# Patient Record
Sex: MALE | Race: WHITE | Employment: FULL TIME | URBAN - METROPOLITAN AREA
[De-identification: names, ages, dates, MRNs, and addresses within clinical notes are randomized per-mention and may not be internally consistent; named-entity substitution may affect disease eponyms.]

---

## 2017-09-12 ENCOUNTER — TRANSFERRED RECORDS (OUTPATIENT)
Dept: HEALTH INFORMATION MANAGEMENT | Facility: CLINIC | Age: 37
End: 2017-09-12

## 2018-02-06 ENCOUNTER — OFFICE VISIT (OUTPATIENT)
Dept: ENDOCRINOLOGY | Facility: CLINIC | Age: 38
End: 2018-02-06
Payer: COMMERCIAL

## 2018-02-06 VITALS
BODY MASS INDEX: 30.36 KG/M2 | HEIGHT: 69 IN | DIASTOLIC BLOOD PRESSURE: 94 MMHG | WEIGHT: 205 LBS | SYSTOLIC BLOOD PRESSURE: 142 MMHG | HEART RATE: 73 BPM

## 2018-02-06 DIAGNOSIS — E10.9 TYPE 1 DIABETES MELLITUS WITHOUT COMPLICATION (H): Primary | ICD-10-CM

## 2018-02-06 DIAGNOSIS — I10 ESSENTIAL HYPERTENSION: ICD-10-CM

## 2018-02-06 DIAGNOSIS — E78.00 HYPERCHOLESTEROLEMIA: ICD-10-CM

## 2018-02-06 DIAGNOSIS — E10.9 TYPE 1 DIABETES MELLITUS WITHOUT COMPLICATION (H): ICD-10-CM

## 2018-02-06 DIAGNOSIS — I10 ESSENTIAL HYPERTENSION: Primary | ICD-10-CM

## 2018-02-06 DIAGNOSIS — Z96.41 INSULIN PUMP STATUS: ICD-10-CM

## 2018-02-06 LAB
ANION GAP SERPL CALCULATED.3IONS-SCNC: 7 MMOL/L (ref 3–14)
BUN SERPL-MCNC: 18 MG/DL (ref 7–30)
CALCIUM SERPL-MCNC: 8.7 MG/DL (ref 8.5–10.1)
CHLORIDE SERPL-SCNC: 103 MMOL/L (ref 94–109)
CO2 SERPL-SCNC: 28 MMOL/L (ref 20–32)
CREAT SERPL-MCNC: 1.13 MG/DL (ref 0.66–1.25)
GFR SERPL CREATININE-BSD FRML MDRD: 72 ML/MIN/1.7M2
GLUCOSE SERPL-MCNC: 194 MG/DL (ref 70–99)
HBA1C MFR BLD: 7.2 % (ref 4.3–6)
POTASSIUM SERPL-SCNC: 4.1 MMOL/L (ref 3.4–5.3)
SODIUM SERPL-SCNC: 138 MMOL/L (ref 133–144)
TSH SERPL DL<=0.005 MIU/L-ACNC: 2.58 MU/L (ref 0.4–4)

## 2018-02-06 PROCEDURE — 99215 OFFICE O/P EST HI 40 MIN: CPT | Performed by: INTERNAL MEDICINE

## 2018-02-06 PROCEDURE — 84443 ASSAY THYROID STIM HORMONE: CPT | Performed by: INTERNAL MEDICINE

## 2018-02-06 PROCEDURE — 82043 UR ALBUMIN QUANTITATIVE: CPT | Performed by: INTERNAL MEDICINE

## 2018-02-06 PROCEDURE — 36415 COLL VENOUS BLD VENIPUNCTURE: CPT | Performed by: INTERNAL MEDICINE

## 2018-02-06 PROCEDURE — 83036 HEMOGLOBIN GLYCOSYLATED A1C: CPT | Performed by: INTERNAL MEDICINE

## 2018-02-06 PROCEDURE — 80048 BASIC METABOLIC PNL TOTAL CA: CPT | Performed by: INTERNAL MEDICINE

## 2018-02-06 RX ORDER — LISINOPRIL 10 MG/1
10 TABLET ORAL DAILY
Qty: 30 TABLET | Refills: 11 | Status: SHIPPED | OUTPATIENT
Start: 2018-02-06 | End: 2019-03-19

## 2018-02-06 RX ORDER — SIMVASTATIN 20 MG
TABLET ORAL
Refills: 1 | COMMUNITY
Start: 2017-11-08 | End: 2018-07-13

## 2018-02-06 NOTE — LETTER
2/6/2018         RE: Mateo Rodriguez  [address]  [place] [facility] [address]        Dear Colleague,    Thank you for referring your patient, Mateo Rodriguez, to the [facility]. Please see a copy of my visit note below.    Name: Mateo Rodriguez is a 37 yr old man with a previous diagnosis of T1DM  Patient previously seen by me at my previous clinic ([facility]), here to establish care with [facility].    HPI:  Recent issues:  Here for f/u diabetes evaluation  Feeling well overall, denies new or significant health changes        Diagnosis of diabetes, age 10, living in [place]  Began insulin treatment with insulin injections, recalls taking NPH and Regular insulins  Treatment with multiple daily injection (MDI) plan  2008. Switched to Medtronic insulin pump  Subsequent upgrades to other Medtronic pumps  Had used [employer] CGMS sensor  8/2017. Upgraded to Medtronic 670G pump with Guardian3 sensor   Novolog in pump  Uses [employer] Contour Link meter, tests 3-4x/day  Perceives good hypoglycemia awareness symptoms  DM Complications:  None known  Goes to [facility], Dr. Armenta?    Sees Dr. Kevyn Mcintyre/[facility]    PMH/PSH:  Past Medical History:   Diagnosis Date     Insulin pump status      Type 1 diabetes (H)      History reviewed. No pertinent surgical history.    Family Hx:  Family History   Problem Relation Age of Onset     Other Cancer Father      Heart Failure Maternal Grandfather          Social Hx:  Social History     Social History     Marital status: Single     Spouse name: N/A     Number of children: N/A     Years of education: N/A     Occupational History     Not on file.     Social History Main Topics     Smoking status: Never Smoker     Smokeless tobacco: Never Used     Alcohol use Yes     Drug use: No     Sexual activity: Not on file     Other Topics Concern     Not on file     Social History Narrative     No narrative on  "file          MEDICATIONS:  has a current medication list which includes the following prescription(s): blood glucose monitoring, novolog vial, and simvastatin.    ROS:     ROS: 10 point ROS neg other than the symptoms noted above in the HPI.    GENERAL: no weight changes, fevers, chills, malaise, night sweats.   HEENT: no dysphagia, odonophagia, diplopia, neck pain or tenderness  THYROID:  no apparent hyper or hypothyroid symptoms  CV: no chest pain, pressure, palpitations, skipped beats  LUNGS: no SOB, VÁZQUEZ, cough, sputum production, wheezing   ABDOMEN: no diarrhea, constipation, abdominal pain  EXTREMITIES: no apparent rashes, ulcers, edema  NEUROLOGY: no headaches, denies changes in vision, tingling, extremitiy numbness   MSK: occasional ankle (achilles?) pains; no other muscle aches or pains, weakness  SKIN: increased sweating; no rashes or lesions  PSYCH:  Stable mood, no significant anxiety or depression  ENDOCRINE: no heat or cold intolerance    Physical Exam   VS: BP (!) 142/94 (BP Location: Right arm, Cuff Size: Adult Large)  Pulse 73  Ht 1.753 m (5' 9\")  Wt 93 kg (205 lb)  BMI 30.27 kg/m2  GENERAL: AXOX3, NAD, well dressed, answering questions appropriately, appears stated age.  THYROID:  normal gland, no apparent nodules or goiter  HEENT: neck non-tender, no exopthalmous, no proptosis, EOMI  CV: RRR, no rubs, gallops, no murmurs  LUNGS: CTAB, no wheezes, rales, or ronchi  ABDOMEN:  Mildly obese, soft, nontender, nondistended, no organomegaly  EXTREMITIES: no edema, +pulses R/L DP3/3 and PT 3/3, no rashes, no lesions  NEUROLOGY: CN grossly intact, normal feet sensation, no tremors  MSK: grossly intact  SKIN: no rashes, no lesions    LABS:    All pertinent notes, labs, and images personally reviewed by me.     A/P:  Encounter Diagnoses   Name Primary?     Type 1 diabetes mellitus without complication (H) Yes     Insulin pump status      Essential hypertension      Hypercholesterolemia      Comments:  I " am pleased patient is feeling well overall  Improved glucose control with use of the Medtronic 670G pump and sensor system.    Plan:  Reviewed the overall T1DM management and insulin pump use.  Discussed optimal BG testing to assess glucose trends.  We reviewed insulin pump settings, basal rate and bolus dosing  Use of automated pump bolus dosing for meal/snack carb & correction dosing    Recommended changes with pump settings:  Increase potency of the breakfast and suppertime I:C setting, continue same Active Insulin time  Use pump auto-mode regularly  Try the Temp Target setting for aerobic exercise    Patient Instructions   Continue current insulin pump and glucose sensor plan  Start BP med as lisinopril 10 mg daily dose  Continue current simvastatin daily dose  Will send insulin pen (Lantus) Rx to pharmacy, use as backup on vacations/trips  Call if questions/issues    Goal target BP <130/<80  Arrange annual dilated eye exam, fasting lipid panel testing.    Addressed patient's questions today.      Labs ordered today:   Orders Placed This Encounter   Procedures     Basic metabolic panel  (Ca, Cl, CO2, Creat, Gluc, K, Na, BUN)     Hemoglobin A1c     TSH     Albumin Random Urine Quantitative with Creat Ratio     Radiology/Consults ordered today: None    More than 50% of the time spent with Mr. Rodriguez on counseling / coordinating his care.  Total appointment time was 40 minutes.      Follow-up:  3 mo or prn    Onur Schneider MD  Endocrinology  [facility]/[place]         Again, thank you for allowing me to participate in the care of your patient.        Sincerely,        Onur Schneider MD

## 2018-02-06 NOTE — PROGRESS NOTES
Name: Mateo Rodriguez is a 37 yr old man with a previous diagnosis of T1DM  Patient previously seen by me at my previous clinic ([facility]), here to establish care with [facility].    HPI:  Recent issues:  Here for f/u diabetes evaluation  Feeling well overall, denies new or significant health changes        Diagnosis of diabetes, age 10, living in [place]  Began insulin treatment with insulin injections, recalls taking NPH and Regular insulins  Treatment with multiple daily injection (MDI) plan  2008. Switched to Medtronic insulin pump  Subsequent upgrades to other Medtronic pumps  Had used [employer] CGMS sensor  8/2017. Upgraded to Medtronic 670G pump with Guardian3 sensor   Novolog in pump  Uses [employer] Contour Link meter, tests 3-4x/day  Perceives good hypoglycemia awareness symptoms  DM Complications:  None known  Goes to [facility], Dr. Armenta?    Sees Dr. Kevyn Mcintyre/[facility]    PMH/PSH:  Past Medical History:   Diagnosis Date     Insulin pump status      Type 1 diabetes (H)      History reviewed. No pertinent surgical history.    Family Hx:  Family History   Problem Relation Age of Onset     Other Cancer Father      Heart Failure Maternal Grandfather          Social Hx:  Social History     Social History     Marital status: Single     Spouse name: N/A     Number of children: N/A     Years of education: N/A     Occupational History     Not on file.     Social History Main Topics     Smoking status: Never Smoker     Smokeless tobacco: Never Used     Alcohol use Yes     Drug use: No     Sexual activity: Not on file     Other Topics Concern     Not on file     Social History Narrative     No narrative on file          MEDICATIONS:  has a current medication list which includes the following prescription(s): blood glucose monitoring, novolog vial, and simvastatin.    ROS:     ROS: 10 point ROS neg other than the symptoms noted above in the  "HPI.    GENERAL: no weight changes, fevers, chills, malaise, night sweats.   HEENT: no dysphagia, odonophagia, diplopia, neck pain or tenderness  THYROID:  no apparent hyper or hypothyroid symptoms  CV: no chest pain, pressure, palpitations, skipped beats  LUNGS: no SOB, VÁZQUEZ, cough, sputum production, wheezing   ABDOMEN: no diarrhea, constipation, abdominal pain  EXTREMITIES: no apparent rashes, ulcers, edema  NEUROLOGY: no headaches, denies changes in vision, tingling, extremitiy numbness   MSK: occasional ankle (achilles?) pains; no other muscle aches or pains, weakness  SKIN: increased sweating; no rashes or lesions  PSYCH:  Stable mood, no significant anxiety or depression  ENDOCRINE: no heat or cold intolerance    Physical Exam   VS: BP (!) 142/94 (BP Location: Right arm, Cuff Size: Adult Large)  Pulse 73  Ht 1.753 m (5' 9\")  Wt 93 kg (205 lb)  BMI 30.27 kg/m2  GENERAL: AXOX3, NAD, well dressed, answering questions appropriately, appears stated age.  THYROID:  normal gland, no apparent nodules or goiter  HEENT: neck non-tender, no exopthalmous, no proptosis, EOMI  CV: RRR, no rubs, gallops, no murmurs  LUNGS: CTAB, no wheezes, rales, or ronchi  ABDOMEN:  Mildly obese, soft, nontender, nondistended, no organomegaly  EXTREMITIES: no edema, +pulses R/L DP3/3 and PT 3/3, no rashes, no lesions  NEUROLOGY: CN grossly intact, normal feet sensation, no tremors  MSK: grossly intact  SKIN: no rashes, no lesions    LABS:    All pertinent notes, labs, and images personally reviewed by me.     A/P:  Encounter Diagnoses   Name Primary?     Type 1 diabetes mellitus without complication (H) Yes     Insulin pump status      Essential hypertension      Hypercholesterolemia      Comments:  I am pleased patient is feeling well overall  Improved glucose control with use of the Medtronic 670G pump and sensor system.    Plan:  Reviewed the overall T1DM management and insulin pump use.  Discussed optimal BG testing to assess " glucose trends.  We reviewed insulin pump settings, basal rate and bolus dosing  Use of automated pump bolus dosing for meal/snack carb & correction dosing    Recommended changes with pump settings:  Increase potency of the breakfast and suppertime I:C setting, continue same Active Insulin time  Use pump auto-mode regularly  Try the Temp Target setting for aerobic exercise    Patient Instructions   Continue current insulin pump and glucose sensor plan  Start BP med as lisinopril 10 mg daily dose  Continue current simvastatin daily dose  Will send insulin pen (Lantus) Rx to pharmacy, use as backup on vacations/trips  Call if questions/issues    Goal target BP <130/<80  Arrange annual dilated eye exam, fasting lipid panel testing.    Addressed patient's questions today.      Labs ordered today:   Orders Placed This Encounter   Procedures     Basic metabolic panel  (Ca, Cl, CO2, Creat, Gluc, K, Na, BUN)     Hemoglobin A1c     TSH     Albumin Random Urine Quantitative with Creat Ratio     Radiology/Consults ordered today: None    More than 50% of the time spent with Mr. Rodriguez on counseling / coordinating his care.  Total appointment time was 40 minutes.      Follow-up:  3 mo or prn    Onur Schneider MD  Endocrinology  [facility]

## 2018-02-06 NOTE — MR AVS SNAPSHOT
"              After Visit Summary   2/6/2018    Mateo Rodriguez    MRN: [id number]           Patient Information     Date Of Birth          1980        Visit Information        Provider Department      2/6/2018 10:00 AM Onur Schneider MD [facility]        Today's Diagnoses     Type 1 diabetes mellitus without complication (H)    -  1    Insulin pump status        Essential hypertension        Hypercholesterolemia          Care Instructions    Continue current insulin pump and glucose sensor plan  Start BP med as lisinopril 10 mg daily dose  Continue current simvastatin daily dose  Will send insulin pen (Lantus) Rx to pharmacy, use as backup on vacations/trips  Call if questions/issues          Follow-ups after your visit        Follow-up notes from your care team     Return in about 3 months (around 5/6/2018).      Who to contact     If you have questions or need follow up information about today's clinic visit or your schedule please contact [facility] directly at [phone].  Normal or non-critical lab and imaging results will be communicated to you by [employer], letter or phone within 4 business days after the clinic has received the results. If you do not hear from us within 7 days, please contact the clinic through [employer] or phone. If you have a critical or abnormal lab result, we will notify you by phone as soon as possible.  Submit refill requests through [email or web address] or call your pharmacy and they will forward the refill request to us. Please allow 3 business days for your refill to be completed.          Additional Information About Your Visit        [employer] Information     [employer] lets you send messages to your doctor, view your test results, renew your prescriptions, schedule appointments and more. To sign up, go to [email or web address] . Click on \"Log in\" on the left side of the screen, which will take you to the Welcome page. Then click on \"Sign up Now\" on the right side of " "the page.     You will be asked to enter the access code listed below, as well as some personal information. Please follow the directions to create your username and password.     Your access code is: [email or web address]  Expires: 2018 10:42 AM     Your access code will  in 90 days. If you need help or a new code, please call your [place] clinic or [phone].        Care EveryWhere ID     This is your Care EveryWhere ID. This could be used by other organizations to access your [place] medical records  [id number]        Your Vitals Were     Pulse Height BMI (Body Mass Index)             73 1.753 m (5' 9\") 30.27 kg/m2          Blood Pressure from Last 3 Encounters:   18 (!) 142/94    Weight from Last 3 Encounters:   18 93 kg (205 lb)              We Performed the Following     Albumin Random Urine Quantitative with Creat Ratio     Basic metabolic panel  (Ca, Cl, CO2, Creat, Gluc, K, Na, BUN)     Hemoglobin A1c     TSH        Primary Care Provider Office Phone # Fax #    [facility] [phone] [address] [address]        Equal Access to Services     EPI CHAVIRA AH: Hadii monroe ku hadasho Soomaali, waaxda luqadaha, qaybta kaalmada adeegyada, prabhu trimble. So [facility] [phone].    ATENCIÓN: Si habla español, tiene a morocho disposición servicios gratuitos de asistencia lingüística. Llame al [phone].    We comply with applicable federal civil rights laws and [place] laws. We do not discriminate on the basis of race, color, national origin, age, disability, sex, sexual orientation, or gender identity.            Thank you!     Thank you for choosing [facility]  for your care. Our goal is always to provide you with excellent care. Hearing back from our patients is one way we can continue to improve our services. Please take a few minutes to complete the written survey that you may receive in the mail after your " visit with us. Thank you!             Your Updated Medication List - Protect others around you: Learn how to safely use, store and throw away your medicines at www.disposemymeds.org.          This list is accurate as of 2/6/18 10:42 AM.  Always use your most recent med list.                   Brand Name Dispense Instructions for use Diagnosis    JANET CONTOUR NEXT test strip   Generic drug:  blood glucose monitoring      Indications: PN:   KAREEM DOYLE   Wed Jan 27, 2016  7:57 AM Received from: External Pharmacy        NovoLOG VIAL 100 UNITS/ML injection   Generic drug:  insulin aspart      U A MAX OF 75 UNITS D WITH INSULIN PUMP        simvastatin 20 MG tablet    ZOCOR

## 2018-02-06 NOTE — PATIENT INSTRUCTIONS
Continue current insulin pump and glucose sensor plan  Start BP med as lisinopril 10 mg daily dose  Continue current simvastatin daily dose  Will send insulin pen (Lantus) Rx to pharmacy, use as backup on vacations/trips  Call if questions/issues

## 2018-02-06 NOTE — NURSING NOTE
"Chief Complaint   Patient presents with     Diabetes       Initial BP (!) 142/94 (BP Location: Right arm, Cuff Size: Adult Large)  Pulse 73  Ht 5' 9\" (1.753 m)  Wt 205 lb (93 kg)  BMI 30.27 kg/m2 Estimated body mass index is 30.27 kg/(m^2) as calculated from the following:    Height as of this encounter: 5' 9\" (1.753 m).    Weight as of this encounter: 205 lb (93 kg).  Medication Reconciliation: complete       Paresh Quintero      "

## 2018-02-07 LAB
CREAT UR-MCNC: 215 MG/DL
MICROALBUMIN UR-MCNC: 11 MG/L
MICROALBUMIN/CREAT UR: 5.3 MG/G CR (ref 0–17)

## 2018-02-09 ENCOUNTER — TELEPHONE (OUTPATIENT)
Dept: FAMILY MEDICINE | Facility: CLINIC | Age: 38
End: 2018-02-09

## 2018-02-09 DIAGNOSIS — E10.9 TYPE 1 DIABETES MELLITUS (H): Primary | ICD-10-CM

## 2018-02-09 DIAGNOSIS — E10.9 TYPE 1 DIABETES MELLITUS WITHOUT COMPLICATION (H): Primary | ICD-10-CM

## 2018-02-09 DIAGNOSIS — Z96.41 INSULIN PUMP STATUS: ICD-10-CM

## 2018-02-09 RX ORDER — INSULIN GLARGINE 100 [IU]/ML
INJECTION, SOLUTION SUBCUTANEOUS
Qty: 15 ML | Refills: 1 | Status: CANCELLED | OUTPATIENT
Start: 2018-02-09

## 2018-02-09 NOTE — TELEPHONE ENCOUNTER
[facility] pharmacy faxing us on the Lantus, this long-acting insulin is not covered under the patient's plan. They will cover Levemir, Tresiba, or Basaglar. I checked with insurance and they report that the formulary insulins do not require PA.    Robert Rawls, CMA

## 2018-02-10 NOTE — TELEPHONE ENCOUNTER
NOVOLOG VIAL 100 UNIT/ML soln      Last Written Prescription Date:  Historical  Last Fill Quantity: -,   # refills: -  Last Office Visit: 2/6/18 [facility]  Future Office visit:       Routing refill request to provider for review/approval because:  Medication is reported/historical

## 2018-02-12 NOTE — TELEPHONE ENCOUNTER
If Basaglar would be an appropriate alternative to treat the patient's diabetes he will need this alternative signed into the pharmacy. Insurance will not cover Lantus.    Robert Rawls, CMA

## 2018-02-14 DIAGNOSIS — E10.9 TYPE 1 DIABETES MELLITUS WITHOUT COMPLICATION (H): ICD-10-CM

## 2018-02-16 NOTE — TELEPHONE ENCOUNTER
Onur Schneiedr MD   Cs Prior Auth 2 days ago                 Sorry for the delay.  Since the Tresiba (longacting) insulin was on formulary, I deleted the Basaglar Rx and then sent the Tresiba pen Rx to patient's pharmacy... Then left him a VM message about this.  Thanks. (Routing comment)

## 2018-03-19 ENCOUNTER — TRANSFERRED RECORDS (OUTPATIENT)
Dept: HEALTH INFORMATION MANAGEMENT | Facility: CLINIC | Age: 38
End: 2018-03-19

## 2018-07-13 DIAGNOSIS — E78.5 HYPERLIPIDEMIA LDL GOAL <100: ICD-10-CM

## 2018-07-13 DIAGNOSIS — E78.5 HYPERLIPIDEMIA: Primary | ICD-10-CM

## 2018-07-13 RX ORDER — SIMVASTATIN 20 MG
20 TABLET ORAL EVERY EVENING
Qty: 90 TABLET | Refills: 3 | Status: SHIPPED | OUTPATIENT
Start: 2018-07-13 | End: 2019-07-30

## 2018-07-13 NOTE — TELEPHONE ENCOUNTER
Last Written Prescription Date:  Historical  Last Fill Quantity: Historical,  # refills: Historical   Last office visit: 2/6/2018 with prescribing provider:  [facility]   Future Office Visit:   Next 5 appointments (look out 90 days)     Sep 07, 2018 11:30 AM CDT   [facility] with Onur Schneider MD   [facility]    [address] [address]   [phone]                 Routing refill request to provider for review/approval because:  Medication is reported/historical

## 2018-07-18 DIAGNOSIS — E10.9 TYPE 1 DIABETES MELLITUS WITHOUT COMPLICATION (H): Primary | ICD-10-CM

## 2018-09-07 ENCOUNTER — OFFICE VISIT (OUTPATIENT)
Dept: ENDOCRINOLOGY | Facility: CLINIC | Age: 38
End: 2018-09-07
Payer: COMMERCIAL

## 2018-09-07 VITALS
HEART RATE: 66 BPM | BODY MASS INDEX: 30.81 KG/M2 | DIASTOLIC BLOOD PRESSURE: 82 MMHG | SYSTOLIC BLOOD PRESSURE: 121 MMHG | HEIGHT: 69 IN | WEIGHT: 208 LBS

## 2018-09-07 DIAGNOSIS — E10.9 TYPE 1 DIABETES MELLITUS WITHOUT COMPLICATION (H): Primary | ICD-10-CM

## 2018-09-07 DIAGNOSIS — Z96.41 INSULIN PUMP STATUS: ICD-10-CM

## 2018-09-07 LAB — HBA1C MFR BLD: 7.7 % (ref 0–5.6)

## 2018-09-07 PROCEDURE — 83036 HEMOGLOBIN GLYCOSYLATED A1C: CPT | Performed by: INTERNAL MEDICINE

## 2018-09-07 PROCEDURE — 95251 CONT GLUC MNTR ANALYSIS I&R: CPT | Performed by: INTERNAL MEDICINE

## 2018-09-07 PROCEDURE — 80048 BASIC METABOLIC PNL TOTAL CA: CPT | Performed by: INTERNAL MEDICINE

## 2018-09-07 PROCEDURE — 99214 OFFICE O/P EST MOD 30 MIN: CPT | Performed by: INTERNAL MEDICINE

## 2018-09-07 PROCEDURE — 36415 COLL VENOUS BLD VENIPUNCTURE: CPT | Performed by: INTERNAL MEDICINE

## 2018-09-07 PROCEDURE — 84443 ASSAY THYROID STIM HORMONE: CPT | Performed by: INTERNAL MEDICINE

## 2018-09-07 NOTE — PROGRESS NOTES
Name: Mateo Rodriguez      HPI:  Recent issues:  Here for f/u diabetes evaluation  Feeling well overall, denies new or significant health change  Planning cage-diving white shark trip to [facility] in early October 2018 1990. Diagnosis of diabetes, age 10, living in [place]  Began insulin treatment with insulin injections, recalls taking NPH and Regular insulins  Treatment with multiple daily injection (MDI) plan  Has seen me for diabetes evaluations at my former clinic ([facility])  2008. Switched to Medtronic insulin pump  Subsequent upgrades to other Medtronic pumps  Had used Pervacioite CGMS sensor  8/2017. Upgraded to Medtronic 670G pump with Guardian3 sensor   Novolog in pump  Uses [employer] Contour Link meter, tests 3-4x/day  Perceives good hypoglycemia awareness symptoms  Current pump settings:      Recent pump Carelink report:      Recent [facility] labs include:    Lab Results   Component Value Date    A1C 7.7 (H) 09/07/2018     09/07/2018    POTASSIUM 3.9 09/07/2018    CHLORIDE 106 09/07/2018    CO2 25 09/07/2018    ANIONGAP 11 09/07/2018     (H) 09/07/2018    BUN 15 09/07/2018    CR 1.11 09/07/2018    GFRESTIMATED 73 09/07/2018    GFRESTBLACK 89 09/07/2018    WILBERTO 8.7 09/07/2018    UCRR 215 02/06/2018    MICROL 11 02/06/2018    UMALCR 5.30 02/06/2018     DM Complications:  None known  Goes to [facility], Dr. Armenta?    , 2 children, lives in [place].   Works as executive with [employer]  Sees Dr. Kevyn Mcintyre/[facility] [place]    PMH/PSH:  Past Medical History:   Diagnosis Date     Insulin pump status      Type 1 diabetes (H)      No past surgical history on file.    Family Hx:  Family History   Problem Relation Age of Onset     Other Cancer Father      Heart Failure Maternal Grandfather          Social Hx:  Social History     Social History     Marital status: Single     Spouse name: N/A     Number of children: N/A     Years of education: N/A     Occupational History     Not on  "file.     Social History Main Topics     Smoking status: Never Smoker     Smokeless tobacco: Never Used     Alcohol use Yes     Drug use: No     Sexual activity: Not on file     Other Topics Concern     Not on file     Social History Narrative          MEDICATIONS:  has a current medication list which includes the following prescription(s): blood glucose monitoring, insulin degludec, lisinopril, novolog vial, and simvastatin.    ROS:     ROS: 10 point ROS neg other than the symptoms noted above in the HPI.    GENERAL: energy good, mild wt gain, denies fevers, chills, malaise, night sweats.   HEENT: no dysphagia, odonophagia, diplopia, neck pain or tenderness  THYROID:  no apparent hyper or hypothyroid symptoms  CV: no chest pain, pressure, palpitations, skipped beats  LUNGS: no SOB, VÁZQUEZ, cough, sputum production, wheezing   ABDOMEN: no diarrhea, constipation, abdominal pain  EXTREMITIES: no apparent rashes, ulcers, edema  NEUROLOGY: no headaches, denies changes in vision, tingling, extremitiy numbness   MSK: occasional ankle (achilles?) pains; no other muscle aches or pains, weakness  SKIN: increased sweating; no rashes or lesions  PSYCH:  stable mood, no significant anxiety or depression  ENDOCRINE: no heat or cold intolerance    Physical Exam   VS: /82  Pulse 66  Ht 1.753 m (5' 9\")  Wt 94.3 kg (208 lb)  BMI 30.72 kg/m2  GENERAL: AXOX3, NAD, well dressed, answering questions appropriately, appears stated age.  THYROID:  normal gland, no apparent nodules or goiter  HEENT: neck non-tender, no exopthalmous, no proptosis, EOMI  CV: RRR, no rubs, gallops, no murmurs  LUNGS: CTAB, no wheezes, rales, or ronchi  ABDOMEN:  Mildly obese, soft, nontender, nondistended, no organomegaly  EXTREMITIES: no edema, no lesions  NEUROLOGY: CN grossly intact, normal feet sensation, no tremors  MSK: grossly intact  SKIN: no rashes, no lesions    LABS:    All pertinent notes, labs, and images personally reviewed by me. " "    A/P:  Encounter Diagnoses   Name Primary?     Type 1 diabetes mellitus without complication (H) Yes     Insulin pump status      Comments:  Reviewed health history and diabetes issues  Improved glucose control with use of the Medtronic 670G pump and sensor system.    Plan:  Reviewed the overall T1DM management and insulin pump use.  Discussed optimal BG testing to assess glucose trends.  We reviewed insulin pump settings, basal rate and bolus dosing  Use of automated pump bolus dosing for meal/snack carb & correction dosing  Reviewed pump Carelink report and the recent Guardian3 glucose sensor trends, in detail.    Recommend:  Use insulin pump auto-mode regularly  Will inquire with Medtronic about getting extra quantity of sensors  Try the Temp Target setting for aerobic exercise  Goal target BP <130/<80  Plan to take extra diabetes supplies on vacations, as noted.  Arrange annual dilated eye exam, fasting lipid panel testing.    Addressed patient's questions today.    Patient Instructions   Continue use of the Medtronic 670G pump and Guardian3 sensor  For exercise:   Use the \"Temp Target\" setting (glucose level of 150 mg/dl) to avoid hypoglycemia when wearing pump    For trip to [place]:   Take extra pump supplies with syringes, Novolog vial, Tresiba pen, backup meter, test strips   If pump malfunction and changing to insulin injections:    Novolog 1 unit per 7 grams at mealtime    Novolog correction scale 1-unit per 40 mg/dl over 140 mg/dl    Tresiba 30 units daily injection dose   Take glucose tablets    Contact Dr. Schneider's office if questions or concerns  Next clinic appt 12/2018    Labs ordered today:   Orders Placed This Encounter   Procedures     GLUCOSE MONITOR, 72 HOUR, PHYS INTERP     Basic metabolic panel     Hemoglobin A1c     TSH     Radiology/Consults ordered today: None    More than 50% of the time spent with Mr. Rodriguez on counseling / coordinating his care.  Total appointment time was 35 " minutes.    Follow-up:  3 mo or prn    Onur Schneider MD  Endocrinology  [facility]

## 2018-09-07 NOTE — PATIENT INSTRUCTIONS
"Continue use of the Medtronic 670G pump and Guardian3 sensor  For exercise:   Use the \"Temp Target\" setting (glucose level of 150 mg/dl) to avoid hypoglycemia when wearing pump    For trip to [place]:   Take extra pump supplies with syringes, Novolog vial, Tresiba pen, backup meter, test strips   If pump malfunction and changing to insulin injections:    Novolog 1 unit per 7 grams at mealtime    Novolog correction scale 1-unit per 40 mg/dl over 140 mg/dl    Tresiba 30 units daily injection dose   Take glucose tablets    Contact Dr. Schneider's office if questions or concerns  Next clinic appt 12/2018  "

## 2018-09-07 NOTE — MR AVS SNAPSHOT
"              After Visit Summary   9/7/2018    Mateo Rodriguez    MRN: [id number]           Patient Information     Date Of Birth          1980        Visit Information        Provider Department      9/7/2018 11:30 AM Onur Schneider MD [facility]        Today's Diagnoses     Type 1 diabetes mellitus without complication (H)    -  1    Insulin pump status          Care Instructions    Continue use of the Medtronic 670G pump and Guardian3 sensor  For exercise:   Use the \"Temp Target\" setting (glucose level of 150 mg/dl) to avoid hypoglycemia when wearing pump    For trip to [place]:   Take extra pump supplies with syringes, Novolog vial, Tresiba pen, backup meter, test strips   If pump malfunction and changing to insulin injections:    Novolog 1 unit per 7 grams at mealtime    Novolog correction scale 1-unit per 40 mg/dl over 140 mg/dl    Tresiba 30 units daily injection dose   Take glucose tablets    Contact Dr. Schneider's office if questions or concerns  Next clinic appt 12/2018          Follow-ups after your visit        Follow-up notes from your care team     Return in about 3 months (around 12/7/2018).      Who to contact     If you have questions or need follow up information about today's clinic visit or your schedule please contact [facility] directly at [phone].  Normal or non-critical lab and imaging results will be communicated to you by Phlexglobalhart, letter or phone within 4 business days after the clinic has received the results. If you do not hear from us within 7 days, please contact the clinic through Phlexglobalhart or phone. If you have a critical or abnormal lab result, we will notify you by phone as soon as possible.  Submit refill requests through H?REL or call your pharmacy and they will forward the refill request to us. Please allow 3 business days for your refill to be completed.          Additional Information About Your Visit        MyChart Information     H?REL lets " "you send messages to your doctor, view your test results, renew your prescriptions, schedule appointments and more. To sign up, go to [email or web address] . Click on \"Log in\" on the left side of the screen, which will take you to the Welcome page. Then click on \"Sign up Now\" on the right side of the page.     You will be asked to enter the access code listed below, as well as some personal information. Please follow the directions to create your username and password.     Your access code is: [email or web address]  Expires: 2018 11:36 AM     Your access code will  in 90 days. If you need help or a new code, please call your [facility] clinic or [phone].        Care EveryWhere ID     This is your Care EveryWhere ID. This could be used by other organizations to access your [facility] medical records  [id number]        Your Vitals Were     Pulse Height BMI (Body Mass Index)             66 1.753 m (5' 9\") 30.72 kg/m2          Blood Pressure from Last 3 Encounters:   18 121/82   18 (!) 142/94    Weight from Last 3 Encounters:   18 94.3 kg (208 lb)   18 93 kg (205 lb)              We Performed the Following     Basic metabolic panel     Hemoglobin A1c     TSH        Primary Care Provider Office Phone # Fax #    Deandra Bañuelos [facility] [phone] [address] [place] [address]        Equal Access to Services     EPI CHAVIRA AH: Hadii aad ku hadasho Soomaali, waaxda luqadaha, qaybta kaalmada adeegyada, waxay sera mcneill . So [facility] [phone].    ATENCIÓN: Si habla radha, tiene a morocho disposición servicios gratuitos de asistencia lingüística. Llame al [phone].    We comply with applicable federal civil rights laws and [place] laws. We do not discriminate on the basis of race, color, national origin, age, disability, sex, sexual orientation, or gender identity.            Thank you!     Thank you for choosing [facility]  " for your care. Our goal is always to provide you with excellent care. Hearing back from our patients is one way we can continue to improve our services. Please take a few minutes to complete the written survey that you may receive in the mail after your visit with us. Thank you!             Your Updated Medication List - Protect others around you: Learn how to safely use, store and throw away your medicines at www.disposemymeds.org.          This list is accurate as of 9/7/18 12:25 PM.  Always use your most recent med list.                   Brand Name Dispense Instructions for use Diagnosis    blood glucose monitoring test strip    JANET CONTOUR NEXT    500 strip    Use to test blood sugar 5 times daily or as directed, E10.9    Type 1 diabetes mellitus without complication (H)       insulin degludec 100 UNIT/ML pen    TRESIBA FLEXTOUCH    15 mL    Inject 20-30 units daily as directed, insulin pens as backup for his insulin pump use    Type 1 diabetes mellitus without complication (H)       lisinopril 10 MG tablet    PRINIVIL/ZESTRIL    30 tablet    Take 1 tablet (10 mg) by mouth daily    Essential hypertension       NovoLOG VIAL 100 UNITS/ML injection   Generic drug:  insulin aspart     30 mL    Use with insulin pump, total daily dose 75 units, E10.9    Type 1 diabetes mellitus without complication (H)       simvastatin 20 MG tablet    ZOCOR    90 tablet    Take 1 tablet (20 mg) by mouth every evening    Hyperlipidemia LDL goal <100

## 2018-09-08 LAB
ANION GAP SERPL CALCULATED.3IONS-SCNC: 11 MMOL/L (ref 3–14)
BUN SERPL-MCNC: 15 MG/DL (ref 7–30)
CALCIUM SERPL-MCNC: 8.7 MG/DL (ref 8.5–10.1)
CHLORIDE SERPL-SCNC: 106 MMOL/L (ref 94–109)
CO2 SERPL-SCNC: 25 MMOL/L (ref 20–32)
CREAT SERPL-MCNC: 1.11 MG/DL (ref 0.66–1.25)
GFR SERPL CREATININE-BSD FRML MDRD: 73 ML/MIN/1.7M2
GLUCOSE SERPL-MCNC: 169 MG/DL (ref 70–99)
POTASSIUM SERPL-SCNC: 3.9 MMOL/L (ref 3.4–5.3)
SODIUM SERPL-SCNC: 142 MMOL/L (ref 133–144)
TSH SERPL DL<=0.005 MIU/L-ACNC: 1.72 MU/L (ref 0.4–4)

## 2018-09-17 ENCOUNTER — ALLIED HEALTH/NURSE VISIT (OUTPATIENT)
Dept: EDUCATION SERVICES | Facility: CLINIC | Age: 38
End: 2018-09-17
Payer: COMMERCIAL

## 2018-09-17 ENCOUNTER — TELEPHONE (OUTPATIENT)
Dept: ENDOCRINOLOGY | Facility: CLINIC | Age: 38
End: 2018-09-17

## 2018-09-17 DIAGNOSIS — E10.9 TYPE 1 DIABETES MELLITUS WITHOUT COMPLICATION (H): Primary | ICD-10-CM

## 2018-09-17 PROCEDURE — G0108 DIAB MANAGE TRN  PER INDIV: HCPCS

## 2018-09-17 NOTE — TELEPHONE ENCOUNTER
Dr. Schneider/Marilyn-     Please advise on message below.     Thank you,   Stephanie SCHNEIDER RN

## 2018-09-17 NOTE — TELEPHONE ENCOUNTER
Reason for Call:  Other appointment    Detailed comments:   Pt has Dr. Schneider as a provider.  He broke his pump this weekend and it needs to be fixed or adjusted so that it works.  Please advise with Dr. Schneider about fitting him in or even with an educator.      Phone Number Patient can be reached at: Home number on file [phone] (home)    Best Time: ASAP    Can we leave a detailed message on this number? YES    Call taken on 9/17/2018 at 8:55 AM by Aarti Gorman

## 2018-09-17 NOTE — PROGRESS NOTES
Diabetes Self-Management Education & Support      Diabetes Self-Management Education & Support - Insulin Pump Review    SUBJECTIVE/OBJECTIVE  Presents for: Individual review  Accompanied by: Self  Focus of Visit: Taking Medication  Diabetes type: Type 1  Disease course: Stable  How confident are you filling out medical forms by yourself:: Not Assessed  Diabetes management related comments/concerns: having his replacement pump settings inputted  Transportation concerns: No  Other concerns:: None  Cultural Influences/Ethnic Background:  American    Patient seen today for Insulin Pump Review:    Insulin Pump Information  Insulin Pump Type: Medtronic Minimed 670G System  Infusion Set: Medtronic    Insulin Pump Review  Insulin Pump Type: Medtronic Minimed 670G System  Taking other diabetes medications?: No  Problems taking diabetes medications regularly?: Yes (insulin pump replaced as it broke last friday)  Diabetes medication side effects?: No  Patient understands DKA prevention: Yes  Patient has an insulin multiple daily injection back-up plan: Yes  Changes made to pump settings: None  Changes made to sensor settings: None  Education specific to insulin pump provided today: Multiple daily injection back-up plan in case of pump failure, Travel  Attempting to begin Automode on 670G today?: No    Healthy Eating  Healthy Eating Assessed Today: No    Being Active  Being Active Assessed Today: No    Monitoring  Monitoring Assessed Today: No    Taking Medications  Diabetes Medication(s)     Insulin Sig    insulin degludec (TRESIBA FLEXTOUCH) 100 UNIT/ML pen Inject 20-30 units daily as directed, insulin pens as backup for his insulin pump use    NOVOLOG VIAL 100 UNIT/ML soln Use with insulin pump, total daily dose 75 units, E10.9      insulin pump settings:        Taking Medication Assessed Today: Yes  Current Treatments: Insulin Pump  Given by: Patient  Problems taking diabetes medications regularly?: Yes (insulin pump  replaced)  Diabetes medication side effects?: No  Treatment Compliance: All of the time    Problem Solving  Problem Solving Assessed Today: Yes  Hypoglycemia Treatment: Glucose (tablets or gel)  Patient carries a carbohydrate source: Yes      Reducing Risks  Reducing Risks Assessed Today: No    Healthy Coping  Healthy Coping Assessed Today: Yes  Emotional response to diabetes: Ready to learn  Informal Support system:: Spouse  Stage of change: ACTION (Actively working towards change)  Patient Activation Measure Survey Score:  No flowsheet data found.      ASSESSMENT  Pt here for getting his replacement 670G pump set up as he wants to make sure they are inputted correctly. Was without a pump over the weekend while in [place] with his wife. He reports using auto mode with his previous pump. Will have to wait at least 48 hours before entering auto mode with his new pump now.     INTERVENTION:   Diabetes knowledge and skills assessment:     Patient is knowledgeable in diabetes management concepts related to: Monitoring, Taking Medication and Problem Solving    Patient needs further education on the following diabetes management concepts: Healthy Eating, Being Active, Monitoring, Reducing Risks and Healthy Coping    Based on learning assessment above, most appropriate setting for further diabetes education would be: Individual setting.    Education provided today on:  Educated on how to input all of his pump and sensor settings. Showed him his setting report and how to verify that what he inputted is correct. He was wondering how deep a sensor can be worn underwater. Explained that they should not be worn past 12 feet for the sensor and should not be worn in water >30 min. Educated to remove his sensor and pump for diving to 33 feet next month but to check his BG at least each hour and bolus as needed for highs using the pump. Educated on suspend before low option for his sensor when he is not in auto mode and that he will  have to remember to turn it on any time he is not in auto mode.    Reviewed his back up plan from his endo at last visit for any insulin pump failure. He feels comfortable with this. No questions on it. Does report he needs insulin pen needles and syringes though for his back up plan.      Opportunities for ongoing education and support in diabetes-self management were discussed.    Pt verbalized understanding of concepts discussed and recommendations provided today.       Education Materials Provided:  No new materials provided today    PLAN  See Patient Instructions for co-developed, patient-stated behavior change goals.  Ordered insulin pen needles as he does not have any and he has a Tresiba pen for back up for his trip next month.   Ordered 1/2 ml syringes as well for insulin pump back up plan.   Insulin pump and sensor settings accurate inputted into his new pump. Only change to note is I:C at 11 am was changed to 7.2 at last endo visit so programmed this setting into his new pump.   Turned suspend before low feature on as he will not be in auto mode first few days. Reminded him to use this anytime he is not in auto mode and wearing a sensor.   Mateo will wear his pump for at least 48 hours before starting auto mode.   AVS printed and provided to patient today. See Follow-Up section for recommended follow-up.    ADRI Jc CDE    Time Spent: 30 minutes  Encounter Type: Individual    Any diabetes medication dose changes were made via the CDE Protocol and Collaborative Practice Agreement with the patient's endocrinology provider. A copy of this encounter was shared with the provider.

## 2018-09-17 NOTE — MR AVS SNAPSHOT
After Visit Summary   9/17/2018    Mateo Rodriguez    MRN: [id number]           Patient Information     Date Of Birth          1980        Visit Information        Provider Department      9/17/2018 10:30 AM  DIABETIC ED RESOURCE [facility]        Today's Diagnoses     Type 1 diabetes mellitus without complication (H)    -  1      Care Instructions      Bring blood glucose meter and logbook with you to all doctor and follow-up appointments.     [facility] for the [facility] Area:  For Your Diabetes Education and Nutrition Appointments Call:  [phone]   For Diabetes Education or Nutrition Related Questions:   Phone: [phone]  E-mail: [email or web address]  Fax: [phone]   If you need a medication refill please contact your pharmacy. Please allow 3 business days for your refills to be completed.    Instructions for emailing the Diabetes Educators    If you need to communicate a non-urgent message to a Diabetes Educator via email, please send to [email or web address].    Please follow the following email guidelines:    Subject line: Secure: your clinic name (example: Secure: [facility])  In the email please include: First name, middle initial, last name and date of birth.    We will be in touch with you within one (1) business day.             Follow-ups after your visit        Who to contact     If you have questions or need follow up information about today's clinic visit or your schedule please contact [facility] directly at [phone].  Normal or non-critical lab and imaging results will be communicated to you by MyChart, letter or phone within 4 business days after the clinic has received the results. If you do not hear from us within 7 days, please contact the clinic through MyChart or phone. If you have a critical or abnormal lab result, we will notify you by phone as soon as  possible.  Submit refill requests through [employer] or call your pharmacy and they will forward the refill request to us. Please allow 3 business days for your refill to be completed.          Additional Information About Your Visit        [facility] Medical Information     [employer] gives you secure access to your electronic health record. If you see a primary care provider, you can also send messages to your care team and make appointments. If you have questions, please call your primary care clinic.  If you do not have a primary care provider, please call [phone] and they will assist you.        Care EveryWhere ID     This is your Care EveryWhere ID. This could be used by other organizations to access your [facility] medical records  [id number]         Blood Pressure from Last 3 Encounters:   09/07/18 121/82   02/06/18 (!) 142/94    Weight from Last 3 Encounters:   09/07/18 94.3 kg (208 lb)   02/06/18 93 kg (205 lb)              Today, you had the following     No orders found for display         Today's Medication Changes          These changes are accurate as of 9/17/18 10:53 AM.  If you have any questions, ask your nurse or doctor.               Start taking these medicines.        Dose/Directions    insulin pen needle 32G X 4 MM   Commonly known as:  BD ARIADNA U/F   Used for:  Type 1 diabetes mellitus without complication (H)        Use 1 daily as directed.  For emergency back up plan if pump fails.   Quantity:  30 each   Refills:  1            Where to get your medicines      Some of these will need a paper prescription and others can be bought over the counter.  Ask your nurse if you have questions.     Bring a paper prescription for each of these medications     insulin pen needle 32G X 4 MM                Primary Care Provider Office Phone # Fax #    Deandra Cecilia [facility] [phone] [address] [place] [address]        Equal Access to Services     EPI CHAVIRA AH: Rae vasquez  Soneryali, wacezarda luqadaha, qaybta kaalmada anny, prabhu wilkinsonshanna nai. So [facility] [phone].    ATENCIÓN: Si evelio garcia, tiene a morocho disposición servicios gratuitos de asistencia lingüística. Herbie al [phone].    We comply with applicable federal civil rights laws and [place] laws. We do not discriminate on the basis of race, color, national origin, age, disability, sex, sexual orientation, or gender identity.            Thank you!     Thank you for choosing [facility]  for your care. Our goal is always to provide you with excellent care. Hearing back from our patients is one way we can continue to improve our services. Please take a few minutes to complete the written survey that you may receive in the mail after your visit with us. Thank you!             Your Updated Medication List - Protect others around you: Learn how to safely use, store and throw away your medicines at www.disposemymeds.org.          This list is accurate as of 9/17/18 10:53 AM.  Always use your most recent med list.                   Brand Name Dispense Instructions for use Diagnosis    blood glucose monitoring test strip    JANET CONTOUR NEXT    500 strip    Use to test blood sugar 5 times daily or as directed, E10.9    Type 1 diabetes mellitus without complication (H)       insulin degludec 100 UNIT/ML pen    TRESIBA FLEXTOUCH    15 mL    Inject 20-30 units daily as directed, insulin pens as backup for his insulin pump use    Type 1 diabetes mellitus without complication (H)       insulin pen needle 32G X 4 MM    BD ARIADNA U/F    30 each    Use 1 daily as directed.  For emergency back up plan if pump fails.    Type 1 diabetes mellitus without complication (H)       lisinopril 10 MG tablet    PRINIVIL/ZESTRIL    30 tablet    Take 1 tablet (10 mg) by mouth daily    Essential hypertension       NovoLOG VIAL 100 UNITS/ML injection   Generic drug:  insulin aspart     30 mL    Use  with insulin pump, total daily dose 75 units, E10.9    Type 1 diabetes mellitus without complication (H)       simvastatin 20 MG tablet    ZOCOR    90 tablet    Take 1 tablet (20 mg) by mouth every evening    Hyperlipidemia LDL goal <100

## 2018-09-17 NOTE — TELEPHONE ENCOUNTER
Called pt. Reports he has a replacement pump and just wants to make sure he is inputting his settings correct. Is willing to come to [facility] today to see me at 10:30 so scheduled him at this time.     Marilyn Block, ADRI LD CDE

## 2018-09-17 NOTE — PATIENT INSTRUCTIONS
Bring blood glucose meter and logbook with you to all doctor and follow-up appointments.     [facility] for the [facility] Area:  For Your Diabetes Education and Nutrition Appointments Call:  [phone]   For Diabetes Education or Nutrition Related Questions:   Phone: [phone]  E-mail: [email or web address]  Fax: [phone]   If you need a medication refill please contact your pharmacy. Please allow 3 business days for your refills to be completed.    Instructions for emailing the Diabetes Educators    If you need to communicate a non-urgent message to a Diabetes Educator via email, please send to [email or web address].    Please follow the following email guidelines:    Subject line: Secure: your clinic name (example: Secure: [facility])  In the email please include: First name, middle initial, last name and date of birth.    We will be in touch with you within one (1) business day.

## 2018-09-21 ENCOUNTER — MEDICAL CORRESPONDENCE (OUTPATIENT)
Dept: HEALTH INFORMATION MANAGEMENT | Facility: CLINIC | Age: 38
End: 2018-09-21

## 2019-02-05 ENCOUNTER — OFFICE VISIT (OUTPATIENT)
Dept: ENDOCRINOLOGY | Facility: CLINIC | Age: 39
End: 2019-02-05
Payer: COMMERCIAL

## 2019-02-05 VITALS
WEIGHT: 203.4 LBS | HEIGHT: 69 IN | HEART RATE: 72 BPM | SYSTOLIC BLOOD PRESSURE: 119 MMHG | BODY MASS INDEX: 30.13 KG/M2 | DIASTOLIC BLOOD PRESSURE: 80 MMHG

## 2019-02-05 DIAGNOSIS — E10.3299 TYPE 1 DIABETES MELLITUS WITH BACKGROUND RETINOPATHY (H): Primary | ICD-10-CM

## 2019-02-05 DIAGNOSIS — Z96.41 INSULIN PUMP STATUS: ICD-10-CM

## 2019-02-05 LAB — HBA1C MFR BLD: 7.8 % (ref 0–5.6)

## 2019-02-05 PROCEDURE — 84460 ALANINE AMINO (ALT) (SGPT): CPT | Performed by: INTERNAL MEDICINE

## 2019-02-05 PROCEDURE — 99215 OFFICE O/P EST HI 40 MIN: CPT | Mod: 25 | Performed by: INTERNAL MEDICINE

## 2019-02-05 PROCEDURE — 99207 C FOOT EXAM  NO CHARGE: CPT | Performed by: INTERNAL MEDICINE

## 2019-02-05 PROCEDURE — 36415 COLL VENOUS BLD VENIPUNCTURE: CPT | Performed by: INTERNAL MEDICINE

## 2019-02-05 PROCEDURE — 95251 CONT GLUC MNTR ANALYSIS I&R: CPT | Performed by: INTERNAL MEDICINE

## 2019-02-05 PROCEDURE — 83036 HEMOGLOBIN GLYCOSYLATED A1C: CPT | Performed by: INTERNAL MEDICINE

## 2019-02-05 PROCEDURE — 80048 BASIC METABOLIC PNL TOTAL CA: CPT | Performed by: INTERNAL MEDICINE

## 2019-02-05 PROCEDURE — 84443 ASSAY THYROID STIM HORMONE: CPT | Performed by: INTERNAL MEDICINE

## 2019-02-05 ASSESSMENT — MIFFLIN-ST. JEOR: SCORE: 1823

## 2019-02-05 NOTE — PROGRESS NOTES
Name: Mateo Rodriguez      HPI:  Recent issues:  Here for f/u diabetes evaluation  Recent vacations:   Successful cage-diving white shark trip to [facility] in early October 2018, then pee-skiing to [place] 1/2019   Leaving for [facility] trip in mid 2/2019, then future [place] pee-skiing 3/2019, [place] skiing 8/2019  Feeling well overall, no recent health issues reported        1990. Diagnosis of diabetes, age 10, living in [place]  Began insulin treatment with insulin injections, recalls taking NPH and Regular insulins  Treatment with multiple daily injection (MDI) plan  Has seen me for diabetes evaluations at my former clinic ([facility])  2008. Switched to Medtronic insulin pump  Subsequent upgrades to other Medtronic pumps  Had used [employer] CGMS sensor  8/2017. Upgraded to Medtronic 670G pump with Guardian3 sensor   Novolog in pump  Uses Aqdot Contour Link meter, tests 3-4x/day  Perceives good hypoglycemia awareness symptoms  Current pump settings:        Recent pump Carelink report:      Recent [facility] labs include:    Lab Results   Component Value Date    A1C 7.7 (H) 09/07/2018     09/07/2018    POTASSIUM 3.9 09/07/2018    CHLORIDE 106 09/07/2018    CO2 25 09/07/2018    ANIONGAP 11 09/07/2018     (H) 09/07/2018    BUN 15 09/07/2018    CR 1.11 09/07/2018    GFRESTIMATED 73 09/07/2018    GFRESTBLACK 89 09/07/2018    WILBERTO 8.7 09/07/2018    UCRR 215 02/06/2018    MICROL 11 02/06/2018    UMALCR 5.30 02/06/2018     Lab Results   Component Value Date    TSH 1.72 09/07/2018     Goes to [facility], Dr. Armenta, BDR noted 3/2018  DM Complications:    Retinopathy    BDR noted 3/2018      , 2 children, lives in [place].   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 Kevyn Mcintyre/[facility] [place]    PMH/PSH:  Past Medical History:   Diagnosis Date     Insulin pump status      Type 1 diabetes (H)      No past surgical history on file.    Family Hx:  Family History   Problem Relation Age of Onset      Other Cancer Father      Heart Failure Maternal Grandfather          Social Hx:  Social History     Socioeconomic History     Marital status: Single     Spouse name: Not on file     Number of children: Not on file     Years of education: Not on file     Highest education level: Not on file   Social Needs     Financial resource strain: Not on file     Food insecurity - worry: Not on file     Food insecurity - inability: Not on file     Transportation needs - medical: Not on file     Transportation needs - non-medical: Not on file   Occupational History     Not on file   Tobacco Use     Smoking status: Never Smoker     Smokeless tobacco: Never Used   Substance and Sexual Activity     Alcohol use: Yes     Drug use: No     Sexual activity: Not on file   Other Topics Concern     Parent/sibling w/ CABG, MI or angioplasty before 65F 55M? Not Asked   Social History Narrative     Not on file          MEDICATIONS:  has a current medication list which includes the following prescription(s): insulin pump, lisinopril, novolog vial, simvastatin, blood glucose, insulin degludec, insulin pen needle, and insulin syringe-needle u-100.    ROS:     ROS: 10 point ROS neg other than the symptoms noted above in the HPI.    GENERAL: energy good, wt stable; denies fevers, chills, malaise, night sweats.   HEENT: no dysphagia, odonophagia, diplopia, neck pain or tenderness  THYROID:  no apparent hyper or hypothyroid symptoms  CV: no chest pain, pressure, palpitations, skipped beats  LUNGS: no SOB, VÁZQUEZ, cough, sputum production, wheezing   ABDOMEN: no diarrhea, constipation, abdominal pain  EXTREMITIES: no apparent rashes, ulcers, edema  NEUROLOGY: no headaches, denies changes in vision, tingling, extremitiy numbness   MSK: occasional ankle (achilles?) pains; no other muscle aches or pains, weakness  SKIN: increased sweating; no rashes or lesions  PSYCH:  stable mood, no significant anxiety or depression  ENDOCRINE: no heat or cold  "intolerance    Physical Exam   VS: /80   Pulse 72   Ht 1.753 m (5' 9\")   Wt 92.3 kg (203 lb 6.4 oz)   BMI 30.04 kg/m    GENERAL: AXOX3, NAD, well dressed, answering questions appropriately, appears stated age.  THYROID:  normal gland, no apparent nodules or goiter  ABDOMEN:  Mildly obese, soft, nontender, nondistended, no organomegaly  EXTREMITIES: no edema, no lesions  NEUROLOGY: CN grossly intact, normal feet sensation, no tremors  MSK: grossly intact  SKIN: normal appearance of feet, no rashes or skin lesions    LABS:    All pertinent notes, labs, and images personally reviewed by me.     A/P:  Encounter Diagnoses   Name Primary?     Type 1 diabetes mellitus with background retinopathy (H) Yes     Insulin pump status      Comments:  Reviewed health history and diabetes issues  Improved glucose control with use of the Medtronic 670G pump and sensor system.    Plan:  Reviewed the overall T1DM management and insulin pump use.  Discussed optimal BG testing to assess glucose trends.  We reviewed insulin pump settings, basal rate and bolus dosing  Use of automated pump bolus dosing for meal/snack carb & correction dosing  Reviewed pump Carelink report and the recent Guardian3 glucose sensor trends, in detail.    Recommend:  Continue current Medtronic pump and CGMS sensor, diabetes management plan  Plan to use insulin pump auto-mode regularly  Pump setting changes:   Bolus: I:C [place] 7.2 to 6.8, 11a 7.2, 5p 7 to 6.8  Try the Temp Target setting for aerobic exercise  Goal target BP <130/<80  Plan to take extra diabetes supplies (Nv vial, Tresiba pen, syringes, pen needles) on vacations, as noted.  Reminded him to use urine ketostix if high BG over 400 mg/dl and/or significant nausea, updated Rx sent to pharmacy  Continue lisinopril and simvastatin daily dose plan  May be due for fasting lipid testing  F/u eye exam 3/2019  Arrange annual dilated eye exam, fasting lipid panel testing.    Addressed patient's " questions today.    Labs ordered today:   Orders Placed This Encounter   Procedures     GLUCOSE MONITOR, 72 HOUR, PHYS INTERP     C FOOT EXAM  NO CHARGE     Hemoglobin A1c     Basic metabolic panel     ALT     TSH with free T4 reflex     Radiology/Consults ordered today: C FOOT EXAM  NO CHARGE    More than 50% of the time spent with Mr. Rodriguez on counseling / coordinating his care.  Total appointment time was 45 minutes.    Follow-up:  3 mo or prn    Onur Schneider MD  Endocrinology  [facility]

## 2019-02-07 LAB
ALT SERPL W P-5'-P-CCNC: 44 U/L (ref 0–70)
ANION GAP SERPL CALCULATED.3IONS-SCNC: 1 MMOL/L (ref 3–14)
BUN SERPL-MCNC: 20 MG/DL (ref 7–30)
CALCIUM SERPL-MCNC: 9.3 MG/DL (ref 8.5–10.1)
CHLORIDE SERPL-SCNC: 100 MMOL/L (ref 94–109)
CO2 SERPL-SCNC: 33 MMOL/L (ref 20–32)
CREAT SERPL-MCNC: 1.1 MG/DL (ref 0.66–1.25)
GFR SERPL CREATININE-BSD FRML MDRD: 83 ML/MIN/{1.73_M2}
GLUCOSE SERPL-MCNC: 160 MG/DL (ref 70–99)
POTASSIUM SERPL-SCNC: 4.3 MMOL/L (ref 3.4–5.3)
SODIUM SERPL-SCNC: 134 MMOL/L (ref 133–144)
TSH SERPL DL<=0.005 MIU/L-ACNC: 1.74 MU/L (ref 0.4–4)

## 2019-03-19 DIAGNOSIS — E10.9 TYPE 1 DIABETES MELLITUS WITHOUT COMPLICATION (H): ICD-10-CM

## 2019-03-19 DIAGNOSIS — I10 ESSENTIAL HYPERTENSION: ICD-10-CM

## 2019-03-19 NOTE — TELEPHONE ENCOUNTER
"NOVOLOG VIAL 100 UNIT/ML soln 30 mL 11 2/14/2018     Last Written Prescription Date:  2/14/18  Last Fill Quantity: 30 ml ,  # refills: 11   Last office visit: No previous visit found with prescribing provider:  Wyatt   Future Office Visit:  None      lisinopril (PRINIVIL/ZESTRIL) 10 MG tablet 30 tablet 11 2/6/2018     Last Written Prescription Date:  2/6/18  Last Fill Quantity: 30,  # refills: 11   Last office visit: No previous visit found with prescribing provider:  Wyatt   Future Office Visit:  None    Requested Prescriptions   Pending Prescriptions Disp Refills     NOVOLOG VIAL 100 UNIT/ML soln [Pharmacy Med Name: NOVOLOG U-100 INSULIN VL10ML(ORANG)] 30 mL 0     Sig: USE UP TO 75 UNITS DAILY WITH INSULIN PUMP    Short Acting Insulin Protocol Failed - 3/19/2019 12:53 PM       Failed - LDL on file in past 12 months    No lab results found.         Passed - Blood pressure less than 140/90 in past 6 months    BP Readings from Last 3 Encounters:   02/05/19 119/80   09/07/18 121/82   02/06/18 (!) 142/94                Passed - Microalbumin on file in past 12 months    Recent Labs   Lab Test 02/06/18  1040   MICROL 11   UMALCR 5.30            Passed - Serum creatinine on file in past 12 months    Recent Labs   Lab Test 02/05/19  1544   CR 1.10            Passed - HgbA1C in past 3 or 6 months    If HgbA1C is 8 or greater, it needs to be on file within the past 3 months.  If less than 8, must be on file within the past 6 months.     Recent Labs   Lab Test 02/05/19  1544   A1C 7.8*            Passed - Medication is active on med list       Passed - Patient is age 18 or older       Passed - Recent (6 mo) or future (30 days) visit within the authorizing provider's specialty    Patient had office visit in the last 6 months or has a visit in the next 30 days with authorizing provider or within the authorizing provider's specialty.  See \"Patient Info\" tab in inbasket, or \"Choose Columns\" in Meds & Orders section of the " "refill encounter.            lisinopril (PRINIVIL/ZESTRIL) 10 MG tablet [Pharmacy Med Name: LISINOPRIL 10MG TABLETS] 30 tablet 0     Sig: TAKE 1 TABLET(10 MG) BY MOUTH DAILY    ACE Inhibitors (Including Combos) Protocol Passed - 3/19/2019 12:53 PM       Passed - Blood pressure under 140/90 in past 12 months    BP Readings from Last 3 Encounters:   02/05/19 119/80   09/07/18 121/82   02/06/18 (!) 142/94                Passed - Recent (12 mo) or future (30 days) visit within the authorizing provider's specialty    Patient had office visit in the last 12 months or has a visit in the next 30 days with authorizing provider or within the authorizing provider's specialty.  See \"Patient Info\" tab in inbasket, or \"Choose Columns\" in Meds & Orders section of the refill encounter.             Passed - Medication is active on med list       Passed - Patient is age 18 or older       Passed - Normal serum creatinine on file in past 12 months    Recent Labs   Lab Test 02/05/19  1544   CR 1.10            Passed - Normal serum potassium on file in past 12 months    Recent Labs   Lab Test 02/05/19  1544   POTASSIUM 4.3             No flowsheet data found.       "

## 2019-03-21 RX ORDER — LISINOPRIL 10 MG/1
TABLET ORAL
Qty: 30 TABLET | Refills: 6 | Status: SHIPPED | OUTPATIENT
Start: 2019-03-21 | End: 2019-11-02

## 2019-04-09 ENCOUNTER — TRANSFERRED RECORDS (OUTPATIENT)
Dept: HEALTH INFORMATION MANAGEMENT | Facility: CLINIC | Age: 39
End: 2019-04-09

## 2019-04-09 ENCOUNTER — MYC MEDICAL ADVICE (OUTPATIENT)
Dept: ENDOCRINOLOGY | Facility: CLINIC | Age: 39
End: 2019-04-09

## 2019-04-14 NOTE — TELEPHONE ENCOUNTER
We have completed a blank [facility] order form with the Guardian3 sensor 15 units/90 days Rx, faxed it to [facility] on 4/15/19.    LEVI Schneider MD  Endocrinology  [facility]

## 2019-05-15 ENCOUNTER — MYC MEDICAL ADVICE (OUTPATIENT)
Dept: ENDOCRINOLOGY | Facility: CLINIC | Age: 39
End: 2019-05-15

## 2019-10-29 ENCOUNTER — MEDICAL CORRESPONDENCE (OUTPATIENT)
Dept: HEALTH INFORMATION MANAGEMENT | Facility: CLINIC | Age: 39
End: 2019-10-29

## 2019-10-29 ENCOUNTER — OFFICE VISIT (OUTPATIENT)
Dept: ENDOCRINOLOGY | Facility: CLINIC | Age: 39
End: 2019-10-29
Payer: COMMERCIAL

## 2019-10-29 VITALS
HEIGHT: 69 IN | WEIGHT: 200.2 LBS | HEART RATE: 70 BPM | DIASTOLIC BLOOD PRESSURE: 70 MMHG | SYSTOLIC BLOOD PRESSURE: 112 MMHG | BODY MASS INDEX: 29.65 KG/M2

## 2019-10-29 DIAGNOSIS — Z96.41 INSULIN PUMP STATUS: ICD-10-CM

## 2019-10-29 DIAGNOSIS — E10.3299 TYPE 1 DIABETES MELLITUS WITH BACKGROUND RETINOPATHY (H): Primary | ICD-10-CM

## 2019-10-29 LAB
ALT SERPL W P-5'-P-CCNC: 36 U/L (ref 0–70)
ANION GAP SERPL CALCULATED.3IONS-SCNC: 8 MMOL/L (ref 3–14)
BUN SERPL-MCNC: 19 MG/DL (ref 7–30)
CALCIUM SERPL-MCNC: 8.9 MG/DL (ref 8.5–10.1)
CHLORIDE SERPL-SCNC: 100 MMOL/L (ref 94–109)
CHOLEST SERPL-MCNC: 213 MG/DL
CO2 SERPL-SCNC: 24 MMOL/L (ref 20–32)
CREAT SERPL-MCNC: 1.15 MG/DL (ref 0.66–1.25)
CREAT UR-MCNC: 84 MG/DL
GFR SERPL CREATININE-BSD FRML MDRD: 79 ML/MIN/{1.73_M2}
GLUCOSE SERPL-MCNC: 301 MG/DL (ref 70–99)
HBA1C MFR BLD: 7.6 % (ref 0–5.6)
HDLC SERPL-MCNC: 59 MG/DL
LDLC SERPL CALC-MCNC: 134 MG/DL
MICROALBUMIN UR-MCNC: <5 MG/L
MICROALBUMIN/CREAT UR: NORMAL MG/G CR (ref 0–17)
NONHDLC SERPL-MCNC: 154 MG/DL
POTASSIUM SERPL-SCNC: 4.7 MMOL/L (ref 3.4–5.3)
SODIUM SERPL-SCNC: 132 MMOL/L (ref 133–144)
TRIGL SERPL-MCNC: 98 MG/DL

## 2019-10-29 PROCEDURE — 80048 BASIC METABOLIC PNL TOTAL CA: CPT | Performed by: INTERNAL MEDICINE

## 2019-10-29 PROCEDURE — 84460 ALANINE AMINO (ALT) (SGPT): CPT | Performed by: INTERNAL MEDICINE

## 2019-10-29 PROCEDURE — 95251 CONT GLUC MNTR ANALYSIS I&R: CPT | Performed by: INTERNAL MEDICINE

## 2019-10-29 PROCEDURE — 83036 HEMOGLOBIN GLYCOSYLATED A1C: CPT | Performed by: INTERNAL MEDICINE

## 2019-10-29 PROCEDURE — 36415 COLL VENOUS BLD VENIPUNCTURE: CPT | Performed by: INTERNAL MEDICINE

## 2019-10-29 PROCEDURE — 82043 UR ALBUMIN QUANTITATIVE: CPT | Performed by: INTERNAL MEDICINE

## 2019-10-29 PROCEDURE — 80061 LIPID PANEL: CPT | Performed by: INTERNAL MEDICINE

## 2019-10-29 PROCEDURE — 99214 OFFICE O/P EST MOD 30 MIN: CPT | Performed by: INTERNAL MEDICINE

## 2019-10-29 ASSESSMENT — MIFFLIN-ST. JEOR: SCORE: 1803.48

## 2019-11-02 DIAGNOSIS — I10 ESSENTIAL HYPERTENSION: ICD-10-CM

## 2019-11-03 DIAGNOSIS — E78.5 HYPERLIPIDEMIA LDL GOAL <100: ICD-10-CM

## 2019-11-03 RX ORDER — SIMVASTATIN 40 MG
40 TABLET ORAL EVERY EVENING
Qty: 90 TABLET | Refills: 3 | Status: SHIPPED | OUTPATIENT
Start: 2019-11-03 | End: 2020-09-08

## 2019-11-04 NOTE — TELEPHONE ENCOUNTER
"Last Written Prescription Date:  3/21/2019  Last Fill Quantity: 30,  # refills: 6   Last office visit: No previous visit found with prescribing provider:  Wyatt   Future Office Visit:      Requested Prescriptions   Pending Prescriptions Disp Refills     lisinopril (PRINIVIL/ZESTRIL) 10 MG tablet [Pharmacy Med Name: LISINOPRIL 10MG TABLETS] 30 tablet 0     Sig: TAKE 1 TABLET(10 MG) BY MOUTH DAILY       ACE Inhibitors (Including Combos) Protocol Passed - 11/2/2019  1:25 AM        Passed - Blood pressure under 140/90 in past 12 months     BP Readings from Last 3 Encounters:   10/29/19 112/70   02/05/19 119/80   09/07/18 121/82                 Passed - Recent (12 mo) or future (30 days) visit within the authorizing provider's specialty     Patient has had an office visit with the authorizing provider or a provider within the authorizing providers department within the previous 12 mos or has a future within next 30 days. See \"Patient Info\" tab in inbasket, or \"Choose Columns\" in Meds & Orders section of the refill encounter.              Passed - Medication is active on med list        Passed - Patient is age 18 or older        Passed - Normal serum creatinine on file in past 12 months     Recent Labs   Lab Test 10/29/19  0932   CR 1.15             Passed - Normal serum potassium on file in past 12 months     Recent Labs   Lab Test 10/29/19  0932   POTASSIUM 4.7               "

## 2019-11-05 RX ORDER — LISINOPRIL 10 MG/1
TABLET ORAL
Qty: 30 TABLET | Refills: 5 | Status: SHIPPED | OUTPATIENT
Start: 2019-11-05 | End: 2020-04-30

## 2019-11-05 NOTE — TELEPHONE ENCOUNTER
Prescription approved per [facility] Refill Protocol.  Shiloh Pierce RN

## 2019-12-16 ENCOUNTER — HEALTH MAINTENANCE LETTER (OUTPATIENT)
Age: 39
End: 2019-12-16

## 2020-01-04 DIAGNOSIS — E10.9 TYPE 1 DIABETES MELLITUS WITHOUT COMPLICATION (H): ICD-10-CM

## 2020-01-04 NOTE — TELEPHONE ENCOUNTER
"Last Written Prescription Date:  10/07/19  Last Fill Quantity: 500 strip,  # refills: 0   Last office visit: 10/29/2019 with prescribing provider:  Wyatt   Future Office Visit:      Requested Prescriptions   Pending Prescriptions Disp Refills     CONTOUR NEXT TEST test strip [Pharmacy Med Name: CONTOUR NEXT TEST [id number]] 500 strip 0     Sig: USE TO TEST FIVE TIMES DAILY OR AS DIRECTED       Diabetic Supplies Protocol Passed - 1/4/2020  9:44 AM        Passed - Medication is active on med list        Passed - Patient is 18 years of age or older        Passed - Recent (6 mo) or future (30 days) visit within the authorizing provider's specialty     Patient had office visit in the last 6 months or has a visit in the next 30 days with authorizing provider.  See \"Patient Info\" tab in inbasket, or \"Choose Columns\" in Meds & Orders section of the refill encounter.              "

## 2020-01-06 NOTE — TELEPHONE ENCOUNTER
Prescription approved per [facility] Refill Protocol.  Heather MCDOWELL RN

## 2020-04-01 ENCOUNTER — MYC MEDICAL ADVICE (OUTPATIENT)
Dept: ENDOCRINOLOGY | Facility: CLINIC | Age: 40
End: 2020-04-01

## 2020-04-30 DIAGNOSIS — E10.9 TYPE 1 DIABETES MELLITUS WITHOUT COMPLICATION (H): ICD-10-CM

## 2020-04-30 NOTE — TELEPHONE ENCOUNTER
Reason for Call:  Medication or medication refill:    Do you use a [facility]?  Name of the pharmacy and phone number for the current request:  [id number] - [place], [place] - [address] AT [place] [address] [address]    Name of the medication requested: NOVOLOG VIAL 100 UNIT/ML soln     Other request:     Can we leave a detailed message on this number? YES    Phone number patient can be reached at: Cell number on file:    Telephone Information:   Mobile [phone]     Best Time: any    Call taken on 4/30/2020 at 3:21 PM by Magalys Kyle

## 2020-06-12 ENCOUNTER — VIRTUAL VISIT (OUTPATIENT)
Dept: ENDOCRINOLOGY | Facility: CLINIC | Age: 40
End: 2020-06-12
Payer: COMMERCIAL

## 2020-06-12 ENCOUNTER — MYC MEDICAL ADVICE (OUTPATIENT)
Dept: ENDOCRINOLOGY | Facility: CLINIC | Age: 40
End: 2020-06-12

## 2020-06-12 VITALS — WEIGHT: 175 LBS | HEIGHT: 69 IN | BODY MASS INDEX: 25.92 KG/M2

## 2020-06-12 DIAGNOSIS — E78.5 HYPERLIPIDEMIA LDL GOAL <100: ICD-10-CM

## 2020-06-12 DIAGNOSIS — Z96.41 INSULIN PUMP STATUS: ICD-10-CM

## 2020-06-12 DIAGNOSIS — E10.3299 TYPE 1 DIABETES MELLITUS WITH BACKGROUND RETINOPATHY (H): Primary | ICD-10-CM

## 2020-06-12 PROCEDURE — 95251 CONT GLUC MNTR ANALYSIS I&R: CPT | Performed by: INTERNAL MEDICINE

## 2020-06-12 PROCEDURE — 99213 OFFICE O/P EST LOW 20 MIN: CPT | Mod: 95 | Performed by: INTERNAL MEDICINE

## 2020-06-12 ASSESSMENT — MIFFLIN-ST. JEOR: SCORE: 1689.17

## 2020-06-12 NOTE — PROGRESS NOTES
"Mateo Rodriguez is a 39 year old male who is being evaluated via a billable video visit.      The patient has been notified of following:     \"This video visit will be conducted via a call between you and your physician/provider. We have found that certain health care needs can be provided without the need for an in-person physical exam.  This service lets us provide the care you need with a video conversation.  If a prescription is necessary we can send it directly to your pharmacy.  If lab work is needed we can place an order for that and you can then stop by our lab to have the test done at a later time.    Video visits are billed at different rates depending on your insurance coverage.  Please reach out to your insurance provider with any questions.    If during the course of the call the physician/provider feels a video visit is not appropriate, you will not be charged for this service.\"    Patient has given verbal consent for Video visit? Yes    Will anyone else be joining your video visit? No        Recent issues:  Diabetes followup  Now using replacement Medtronic pump in 3/2020  Feeling well overall, no recent health issues reported  Success with wt loss, estimated wt loss 20# in recent months           1990. Diagnosis of diabetes, age 10, living in [place]  Began insulin treatment with insulin injections, recalls taking NPH and Regular insulins  Treatment with multiple daily injection (MDI) plan  Has seen me for diabetes evaluations at my former clinic ([facility])  2008. Switched to Medtronic insulin pump  Subsequent upgrades to other Medtronic pumps  Had used Enlite CGMS sensor  8/2017. Upgraded to Medtronic 670G pump with Guardian3 sensor              Novolog in pump     Uses [employer] Contour Link meter, tests 3-4x/day  Perceives good hypoglycemia awareness symptoms  Previous pump settings:       Recent pump Carelink report:    Recent [facility] labs include:  Lab Results   Component Value Date    A1C 7.6 (H) 10/29/2019    "  (L) 10/29/2019    POTASSIUM 4.7 10/29/2019    CHLORIDE 100 10/29/2019    CO2 24 10/29/2019    ANIONGAP 8 10/29/2019     (H) 10/29/2019    BUN 19 10/29/2019    CR 1.15 10/29/2019    GFRESTIMATED 79 10/29/2019    GFRESTBLACK >90 10/29/2019    WILBERTO 8.9 10/29/2019    CHOL 213 (H) 10/29/2019    TRIG 98 10/29/2019    HDL 59 10/29/2019     (H) 10/29/2019    NHDL 154 (H) 10/29/2019    UCRR 84 10/29/2019    MICROL <5 10/29/2019    UMALCR Unable to calculate due to low value 10/29/2019     Goes to [facility], Dr. Armenta, BDR noted 4/2019  DM Complications:               Retinopathy                          BDR noted 3/2018        , 2 children, lives in [place].   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 Kevyn Mcintyre/[facility] [place]        ROS: 10 point ROS neg other than the symptoms noted above in the HPI.     GENERAL: energy good, wt stable; denies fevers, chills, malaise, night sweats.   HEENT: no dysphagia, odonophagia, diplopia, neck pain or tenderness  THYROID:  no apparent hyper or hypothyroid symptoms  CV: no chest pain, pressure, palpitations, skipped beats  LUNGS: no SOB, VÁZQUEZ, cough, sputum production, wheezing   ABDOMEN: no diarrhea, constipation, abdominal pain  EXTREMITIES: no apparent rashes, ulcers, edema  NEUROLOGY: no headaches, denies changes in vision, tingling, extremitiy numbness   MSK: occasional ankle (achilles?) pains; no other muscle aches or pains, weakness  SKIN: increased sweating; no rashes or lesions  PSYCH:  stable mood, no significant anxiety or depression  ENDOCRINE: no heat or cold intolerance     Physical Exam (visual exam)  VS:  no vital signs taken for video visit  GENERAL: healthy, alert and NAD, well dressed, answering questions appropriately  EYES: eyes grossly normal to inspection, conjunctivae and sclerae normal, no exophthalmos or proptosis  THYROID:  no apparent nodules or goiter  LUNGS: no audible wheeze, cough or visible  cyanosis, no visible retractions or increased work of breathing  ABDOMEN: abdomen not evaluated  EXTREMITIES: no hand tremors, limited exam  NEUROLOGY: CN grossly intact, mentation intact and speech normal   PSYCH: mentation appears normal, affect normal/bright, judgement and insight intact, normal speech and appearance well groomed       LABS:     All pertinent notes, labs, and images personally reviewed by me.      A/P:       Encounter Diagnoses   Name Primary?     Type 1 diabetes mellitus with background retinopathy (H) Yes     Insulin pump status          Comments:  Reviewed health history and diabetes issues  Improved glucose control with use of the Medtronic 670G pump and sensor system.     Plan:  Reviewed the overall T1DM management and insulin pump use.  Discussed optimal BG testing to assess glucose trends.  We reviewed insulin pump settings, basal rate and bolus dosing  Use of automated pump bolus dosing for meal/snack carb & correction dosing  Discussed use of the Guardian3 glucose sensor     Recommend:  Continue current Medtronic pump and CGMS sensor, diabetes management plan  Plan to use insulin pump auto-mode regularly  No pump setting changes at this time   Encouraged him to upload his pump to [employer] soon, then message me so I can review data  Discussed potential issues with the weight loss, improved insulin sensitivity, and hypoglycemia  Use the Temp Target setting for aerobic exercise  Plan fasting lab appt at his nearby [facility] clinic, lab orders placed  Plan to take extra diabetes supplies (Nv vial, Tresiba pen, syringes, pen needles) on vacations  Continue lisinopril and simvastatin daily dose plan  Arrange annual dilated eye exam, fasting lipid panel testing.     Addressed patient's questions today.        More than 50% of the time spent with Mr. Rodriguez on counseling / coordinating his care.  Total appointment time was 16 minutes.     Follow-up:  3 mo or prn     ELIZABETH Schneider MD,  MS  Endocrinology  [facility]        Video-Visit Details    Type of service:  Video Visit    Video Start Time: 10:30 am  Video End Time: 10:46 am    Originating Location (pt. Location): work    Distant Location (provider location):  [facility]     Platform used for Video Visit: Nina

## 2020-06-15 DIAGNOSIS — E78.5 HYPERLIPIDEMIA LDL GOAL <100: ICD-10-CM

## 2020-06-15 DIAGNOSIS — E10.3299 TYPE 1 DIABETES MELLITUS WITH BACKGROUND RETINOPATHY (H): ICD-10-CM

## 2020-06-15 LAB — HBA1C MFR BLD: 7.7 % (ref 0–5.6)

## 2020-06-15 PROCEDURE — 80061 LIPID PANEL: CPT | Performed by: INTERNAL MEDICINE

## 2020-06-15 PROCEDURE — 83036 HEMOGLOBIN GLYCOSYLATED A1C: CPT | Performed by: INTERNAL MEDICINE

## 2020-06-15 PROCEDURE — 84443 ASSAY THYROID STIM HORMONE: CPT | Performed by: INTERNAL MEDICINE

## 2020-06-15 PROCEDURE — 82043 UR ALBUMIN QUANTITATIVE: CPT | Performed by: INTERNAL MEDICINE

## 2020-06-15 PROCEDURE — 80048 BASIC METABOLIC PNL TOTAL CA: CPT | Performed by: INTERNAL MEDICINE

## 2020-06-15 PROCEDURE — 36415 COLL VENOUS BLD VENIPUNCTURE: CPT | Performed by: INTERNAL MEDICINE

## 2020-06-16 LAB
ANION GAP SERPL CALCULATED.3IONS-SCNC: 7 MMOL/L (ref 3–14)
BUN SERPL-MCNC: 22 MG/DL (ref 7–30)
CALCIUM SERPL-MCNC: 8.7 MG/DL (ref 8.5–10.1)
CHLORIDE SERPL-SCNC: 103 MMOL/L (ref 94–109)
CHOLEST SERPL-MCNC: 177 MG/DL
CO2 SERPL-SCNC: 26 MMOL/L (ref 20–32)
CREAT SERPL-MCNC: 1.14 MG/DL (ref 0.66–1.25)
CREAT UR-MCNC: 119 MG/DL
GFR SERPL CREATININE-BSD FRML MDRD: 79 ML/MIN/{1.73_M2}
GLUCOSE SERPL-MCNC: 183 MG/DL (ref 70–99)
HDLC SERPL-MCNC: 70 MG/DL
LDLC SERPL CALC-MCNC: 91 MG/DL
MICROALBUMIN UR-MCNC: 6 MG/L
MICROALBUMIN/CREAT UR: 4.99 MG/G CR (ref 0–17)
NONHDLC SERPL-MCNC: 107 MG/DL
POTASSIUM SERPL-SCNC: 4.3 MMOL/L (ref 3.4–5.3)
SODIUM SERPL-SCNC: 136 MMOL/L (ref 133–144)
TRIGL SERPL-MCNC: 79 MG/DL
TSH SERPL DL<=0.005 MIU/L-ACNC: 1.89 MU/L (ref 0.4–4)

## 2020-06-18 NOTE — TELEPHONE ENCOUNTER
I reviewed the continuous glucose monitoring system glucose report for patient.    Glucose sensor brand: Medtronic 670G      Recent CGMS data:          Recommendations:  Change bolus ICR:  MN 6.6 to 6.2, 11a 6.9 to 6.5, 5p 6.4 to 6.2    I sent a [employer] message to the patient, provided feedback to him to make the pump setting changes.  I also encouraged to contact our office or one of our [facility] Diabetes Educators if further questions about the sensor use.      ELIZABETH Schneider MD, MS  Endocrinology  [facility]

## 2020-07-16 ENCOUNTER — MYC MEDICAL ADVICE (OUTPATIENT)
Dept: ENDOCRINOLOGY | Facility: CLINIC | Age: 40
End: 2020-07-16

## 2020-07-16 NOTE — TELEPHONE ENCOUNTER
Dr. Schneider,     Please see message and advise, as able.     Thank you!  Stephanie SCHNEIDER RN

## 2020-07-24 DIAGNOSIS — E10.9 TYPE 1 DIABETES MELLITUS WITHOUT COMPLICATION (H): ICD-10-CM

## 2020-07-24 NOTE — TELEPHONE ENCOUNTER
LOV 6-12-20 TL (video)  Follow up 3 months, not scheduled    Will send patient CrossReaderhart message to schedule    RT Neva (R)

## 2020-09-08 ENCOUNTER — VIRTUAL VISIT (OUTPATIENT)
Dept: ENDOCRINOLOGY | Facility: CLINIC | Age: 40
End: 2020-09-08
Payer: COMMERCIAL

## 2020-09-08 DIAGNOSIS — I10 ESSENTIAL HYPERTENSION: ICD-10-CM

## 2020-09-08 DIAGNOSIS — E10.9 TYPE 1 DIABETES MELLITUS WITHOUT COMPLICATION (H): ICD-10-CM

## 2020-09-08 DIAGNOSIS — Z96.41 INSULIN PUMP STATUS: Primary | ICD-10-CM

## 2020-09-08 DIAGNOSIS — E78.5 HYPERLIPIDEMIA LDL GOAL <100: ICD-10-CM

## 2020-09-08 PROCEDURE — 99213 OFFICE O/P EST LOW 20 MIN: CPT | Mod: 95 | Performed by: INTERNAL MEDICINE

## 2020-09-08 PROCEDURE — 95251 CONT GLUC MNTR ANALYSIS I&R: CPT | Performed by: INTERNAL MEDICINE

## 2020-09-08 RX ORDER — SIMVASTATIN 40 MG
40 TABLET ORAL EVERY EVENING
Qty: 90 TABLET | Refills: 3 | Status: SHIPPED | OUTPATIENT
Start: 2020-09-08 | End: 2021-10-25

## 2020-09-08 RX ORDER — LISINOPRIL 10 MG/1
10 TABLET ORAL DAILY
Qty: 90 TABLET | Refills: 3 | Status: SHIPPED | OUTPATIENT
Start: 2020-09-08 | End: 2021-09-30

## 2020-09-08 NOTE — LETTER
"    9/8/2020         RE: Mateo Rodriguez  [address]  [place] [place] [address]        Dear Colleague,    Thank you for referring your patient, Mateo Rodriguez, to the [facility]. Please see a copy of my visit note below.    Mateo Rodriguez is a 40 year old male who is being evaluated via a billable video visit.      The patient has been notified of following:     \"This video visit will be conducted via a call between you and your physician/provider. We have found that certain health care needs can be provided without the need for an in-person physical exam.  This service lets us provide the care you need with a video conversation.  If a prescription is necessary we can send it directly to your pharmacy.  If lab work is needed we can place an order for that and you can then stop by our lab to have the test done at a later time.    Video visits are billed at different rates depending on your insurance coverage.  Please reach out to your insurance provider with any questions.    If during the course of the call the physician/provider feels a video visit is not appropriate, you will not be charged for this service.\"    Patient has given verbal consent for Video visit? Yes  How would you like to obtain your AVS? Verbally reviewed   If you are dropped from the video visit, the video invite should be resent to: Cellphone  Will anyone else be joining your video visit? No        Recent issues:  Diabetes follow-up evaluation  Feeling well overall, no recent health issues reported  Success with wt loss, now approx 175#           1990. Diagnosis of diabetes, age 10, living in [place]  Began insulin treatment with insulin injections, recalls taking NPH and Regular insulins  Treatment with multiple daily injection (MDI) plan  Has seen me for diabetes evaluations at my former clinic ([facility])  2008. Switched to Medtronic insulin pump  Subsequent upgrades to other Medtronic pumps  Had used Enlite CGMS sensor  8/2017. " Upgraded to Medtronic 670G pump with Guardian3 sensor              Novolog in pump     Uses [employer] Contour Link meter, tests 3-4x/day  Perceives good hypoglycemia awareness symptoms  Current pump settings:       Recent pump Carelink report:           Recent [facility] labs include:  Lab Results   Component Value Date    A1C 7.7 (H) 06/15/2020     06/15/2020    POTASSIUM 4.3 06/15/2020    CHLORIDE 103 06/15/2020    CO2 26 06/15/2020    ANIONGAP 7 06/15/2020     (H) 06/15/2020    BUN 22 06/15/2020    CR 1.14 06/15/2020    GFRESTIMATED 79 06/15/2020    GFRESTBLACK >90 06/15/2020    WILBERTO 8.7 06/15/2020    CHOL 177 06/15/2020    TRIG 79 06/15/2020    HDL 70 06/15/2020    LDL 91 06/15/2020    NHDL 107 06/15/2020    UCRR 119 06/15/2020    MICROL 6 06/15/2020    UMALCR 4.99 06/15/2020     Goes to [facility], Dr. Armenta, BDR noted 4/2019  DM Complications:               Retinopathy                          BDR noted 3/2018        , 2 children, lives in [place].   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 Kevyn Mcintyre/[facility]        ROS: 10 point ROS neg other than the symptoms noted above in the HPI.     GENERAL: energy good, recent wt stable; denies fevers, chills, malaise, night sweats.   HEENT: no dysphagia, odonophagia, diplopia, neck pain or tenderness  THYROID:  no apparent hyper or hypothyroid symptoms  CV: no chest pain, pressure, palpitations, skipped beats  LUNGS: no SOB, VÁZQUEZ, cough, sputum production, wheezing   ABDOMEN: no diarrhea, constipation, abdominal pain  EXTREMITIES: no apparent rashes, ulcers, edema  NEUROLOGY: no headaches, denies changes in vision, tingling, extremitiy numbness   MSK: occasional ankle (achilles?) pains; no other muscle aches or pains, weakness  SKIN: increased sweating; no rashes or lesions  PSYCH:  stable mood, no significant anxiety or depression  ENDOCRINE: no heat or cold intolerance     Physical Exam (visual exam)  VS:  no vital  signs taken for video visit  GENERAL: healthy, alert and NAD, well dressed, answering questions appropriately  EYES: eyes grossly normal to inspection, conjunctivae and sclerae normal, no exophthalmos or proptosis  THYROID:  no apparent nodules or goiter  LUNGS: no audible wheeze, cough or visible cyanosis, no visible retractions or increased work of breathing  ABDOMEN: abdomen not evaluated  EXTREMITIES: no hand tremors, limited exam  NEUROLOGY: CN grossly intact, mentation intact and speech normal   PSYCH: mentation appears normal, affect normal/bright, judgement and insight intact, normal speech and appearance well groomed        LABS:     All pertinent notes, labs, and images personally reviewed by me.      A/P:          Encounter Diagnoses   Name      Type 1 diabetes mellitus with background retinopathy (H)      Insulin pump status           Comments:  Reviewed health history and diabetes issues  Improved glucose control with use of the Medtronic 670G pump and sensor system.     Plan:  Reviewed the overall T1DM management and insulin pump use.  Discussed optimal BG testing to assess glucose trends.  We reviewed insulin pump settings, basal rate and bolus dosing  Use of automated pump bolus dosing for meal/snack carb & correction dosing  Discussed use of the Guardian3 glucose sensor     Recommend:  Continue current Medtronic pump and CGMS sensor, diabetes management plan  Pump setting changes:   Bolus ICR:  11a 6.5 to 6.2, 5p 6.2 to 6.0  Continue use of the insulin pump auto-mode regularly  Use the Temp Target setting for aerobic exercise  No labs ordered at this time  Plan to take extra diabetes supplies (Nv vial, Tresiba pen, syringes, pen needles) on vacations  Continue lisinopril and simvastatin daily dose plan  Arrange annual dilated eye exam, fasting lipid panel testing.     Addressed patient's questions today.        More than 50% of the time spent with Mr. Rodriguez on counseling / coordinating his care.   Total appointment time was 20 minutes.     Follow-up:  1/2021 or vikki Schneider MD, MS  Endocrinology  [facility]      Video-Visit Details    Type of service:  Video Visit    Video Start Time: 3:30 pm  Video End Time: 3:50 pm    Originating Location (pt. Location): home    Distant Location (provider location):  [facility]/[place]     Platform used for Video Visit: Well          Again, thank you for allowing me to participate in the care of your patient.        Sincerely,        Onur Schneider MD

## 2020-09-08 NOTE — PROGRESS NOTES
"Mateo Rodriguez is a 40 year old male who is being evaluated via a billable video visit.      The patient has been notified of following:     \"This video visit will be conducted via a call between you and your physician/provider. We have found that certain health care needs can be provided without the need for an in-person physical exam.  This service lets us provide the care you need with a video conversation.  If a prescription is necessary we can send it directly to your pharmacy.  If lab work is needed we can place an order for that and you can then stop by our lab to have the test done at a later time.    Video visits are billed at different rates depending on your insurance coverage.  Please reach out to your insurance provider with any questions.    If during the course of the call the physician/provider feels a video visit is not appropriate, you will not be charged for this service.\"    Patient has given verbal consent for Video visit? Yes  How would you like to obtain your AVS? Verbally reviewed   If you are dropped from the video visit, the video invite should be resent to: Cellphone  Will anyone else be joining your video visit? No        Recent issues:  Diabetes follow-up evaluation  Feeling well overall, no recent health issues reported  Success with wt loss, now approx 175#           1990. Diagnosis of diabetes, age 10, living in [place]  Began insulin treatment with insulin injections, recalls taking NPH and Regular insulins  Treatment with multiple daily injection (MDI) plan  Has seen me for diabetes evaluations at my former clinic ([facility])  2008. Switched to Medtronic insulin pump  Subsequent upgrades to other Medtronic pumps  Had used Enlite CGMS sensor  8/2017. Upgraded to Medtronic 670G pump with Guardian3 sensor              Novolog in pump     Uses [employer] Contour Link meter, tests 3-4x/day  Perceives good hypoglycemia awareness symptoms  Current pump settings:       Recent pump Carelink " report:           Recent [facility] labs include:  Lab Results   Component Value Date    A1C 7.7 (H) 06/15/2020     06/15/2020    POTASSIUM 4.3 06/15/2020    CHLORIDE 103 06/15/2020    CO2 26 06/15/2020    ANIONGAP 7 06/15/2020     (H) 06/15/2020    BUN 22 06/15/2020    CR 1.14 06/15/2020    GFRESTIMATED 79 06/15/2020    GFRESTBLACK >90 06/15/2020    WILBERTO 8.7 06/15/2020    CHOL 177 06/15/2020    TRIG 79 06/15/2020    HDL 70 06/15/2020    LDL 91 06/15/2020    NHDL 107 06/15/2020    UCRR 119 06/15/2020    MICROL 6 06/15/2020    UMALCR 4.99 06/15/2020     Goes to [facility], Dr. Armenta, BDR noted 4/2019  DM Complications:               Retinopathy                          BDR noted 3/2018        , 2 children, lives in [place].   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     GENERAL: energy good, recent wt stable; denies fevers, chills, malaise, night sweats.   HEENT: no dysphagia, odonophagia, diplopia, neck pain or tenderness  THYROID:  no apparent hyper or hypothyroid symptoms  CV: no chest pain, pressure, palpitations, skipped beats  LUNGS: no SOB, VÁZQUEZ, cough, sputum production, wheezing   ABDOMEN: no diarrhea, constipation, abdominal pain  EXTREMITIES: no apparent rashes, ulcers, edema  NEUROLOGY: no headaches, denies changes in vision, tingling, extremitiy numbness   MSK: occasional ankle (achilles?) pains; no other muscle aches or pains, weakness  SKIN: increased sweating; no rashes or lesions  PSYCH:  stable mood, no significant anxiety or depression  ENDOCRINE: no heat or cold intolerance     Physical Exam (visual exam)  VS:  no vital signs taken for video visit  GENERAL: healthy, alert and NAD, well dressed, answering questions appropriately  EYES: eyes grossly normal to inspection, conjunctivae and sclerae normal, no exophthalmos or proptosis  THYROID:  no  apparent nodules or goiter  LUNGS: no audible wheeze, cough or visible cyanosis, no visible retractions or increased work of breathing  ABDOMEN: abdomen not evaluated  EXTREMITIES: no hand tremors, limited exam  NEUROLOGY: CN grossly intact, mentation intact and speech normal   PSYCH: mentation appears normal, affect normal/bright, judgement and insight intact, normal speech and appearance well groomed        LABS:     All pertinent notes, labs, and images personally reviewed by me.      A/P:          Encounter Diagnoses   Name      Type 1 diabetes mellitus with background retinopathy (H)      Insulin pump status           Comments:  Reviewed health history and diabetes issues  Improved glucose control with use of the Medtronic 670G pump and sensor system.     Plan:  Reviewed the overall T1DM management and insulin pump use.  Discussed optimal BG testing to assess glucose trends.  We reviewed insulin pump settings, basal rate and bolus dosing  Use of automated pump bolus dosing for meal/snack carb & correction dosing  Discussed use of the Guardian3 glucose sensor     Recommend:  Continue current Medtronic pump and CGMS sensor, diabetes management plan  Pump setting changes:   Bolus ICR:  11a 6.5 to 6.2, 5p 6.2 to 6.0  Continue use of the insulin pump auto-mode regularly  Use the Temp Target setting for aerobic exercise  No labs ordered at this time  Plan to take extra diabetes supplies (Nv vial, Tresiba pen, syringes, pen needles) on vacations  Continue lisinopril and simvastatin daily dose plan  Arrange annual dilated eye exam, fasting lipid panel testing.     Addressed patient's questions today.        More than 50% of the time spent with Mr. Rodriguez on counseling / coordinating his care.  Total appointment time was 20 minutes.     Follow-up:  1/2021 or vikki Schneider MD, MS  Endocrinology  [facility]      Video-Visit Details    Type of service:  Video Visit    Video Start Time: 3:30 pm  Video End  Time: 3:50 pm    Originating Location (pt. Location): home    Distant Location (provider location):  [facility] [place]     Platform used for Video Visit: Mina

## 2020-09-20 DIAGNOSIS — E10.9 TYPE 1 DIABETES MELLITUS WITHOUT COMPLICATION (H): ICD-10-CM

## 2021-01-15 ENCOUNTER — HEALTH MAINTENANCE LETTER (OUTPATIENT)
Age: 41
End: 2021-01-15

## 2021-03-13 DIAGNOSIS — E10.9 TYPE 1 DIABETES MELLITUS WITHOUT COMPLICATION (H): ICD-10-CM

## 2021-03-16 ENCOUNTER — VIRTUAL VISIT (OUTPATIENT)
Dept: ENDOCRINOLOGY | Facility: CLINIC | Age: 41
End: 2021-03-16
Payer: COMMERCIAL

## 2021-03-16 DIAGNOSIS — E10.3299 TYPE 1 DIABETES MELLITUS WITH BACKGROUND RETINOPATHY (H): Primary | ICD-10-CM

## 2021-03-16 DIAGNOSIS — Z96.41 INSULIN PUMP STATUS: ICD-10-CM

## 2021-03-16 PROCEDURE — 95251 CONT GLUC MNTR ANALYSIS I&R: CPT | Performed by: INTERNAL MEDICINE

## 2021-03-16 PROCEDURE — 99214 OFFICE O/P EST MOD 30 MIN: CPT | Mod: 95 | Performed by: INTERNAL MEDICINE

## 2021-03-16 NOTE — PROGRESS NOTES
Mateo is a 40 year old who is being evaluated via a billable video visit.      How would you like to obtain your AVS?  Reviewed verbally  If the video visit is dropped, the invitation should be resent by: cell phone  Will anyone else be joining your video visit? no      Video Start Time:  8:00 am      Recent issues:  Diabetes follow-up evaluation  Traveled to [facility] in recent months, now returned to [place]  Feeling well overall, no recent health issues reported           1990. Diagnosis of diabetes, age 10, living in [place]  Began insulin treatment with insulin injections, recalls taking NPH and Regular insulins  Treatment with multiple daily injection (MDI) plan  Has seen me for diabetes evaluations at my former clinic ([facility])  2008. Switched to Medtronic insulin pump  Subsequent upgrades to other Medtronic pumps  Had used Inbiomotion CGMS sensor  8/2017. Upgraded to Medtronic 670G pump with Guardian3 sensor              Novolog in pump     Uses Tyrogenex Contour Link meter, tests 3-4x/day  Perceives good hypoglycemia awareness symptoms  Current pump settings:       Recent pump Carelink report:             Recent [facility] labs include:  Lab Results   Component Value Date    A1C 7.7 (H) 06/15/2020     06/15/2020    POTASSIUM 4.3 06/15/2020    CHLORIDE 103 06/15/2020    CO2 26 06/15/2020    ANIONGAP 7 06/15/2020     (H) 06/15/2020    BUN 22 06/15/2020    CR 1.14 06/15/2020    GFRESTIMATED 79 06/15/2020    GFRESTBLACK >90 06/15/2020    WILBERTO 8.7 06/15/2020    CHOL 177 06/15/2020    TRIG 79 06/15/2020    HDL 70 06/15/2020    LDL 91 06/15/2020    NHDL 107 06/15/2020    UCRR 119 06/15/2020    MICROL 6 06/15/2020    UMALCR 4.99 06/15/2020     Goes to [facility], Dr. Armenta, BDR noted 4/2019  DM Complications:               Retinopathy                          BDR noted 3/2018        , 2 children, lives in [place].   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     GENERAL: energy good, ; denies fevers, chills, malaise, night sweats.   HEENT: no dysphagia, odonophagia, diplopia, neck pain or tenderness  THYROID:  no apparent hyper or hypothyroid symptoms  CV: no chest pain, pressure, palpitations, skipped beats  LUNGS: no SOB, VÁZQUEZ, cough, sputum production, wheezing   ABDOMEN: no diarrhea, constipation, abdominal pain  EXTREMITIES: no apparent rashes, ulcers, edema  NEUROLOGY: no headaches, denies changes in vision, tingling, extremitiy numbness   MSK: occasional ankle (achilles?) pains; no other muscle aches or pains, weakness  SKIN: increased sweating; no rashes or lesions  PSYCH:  stable mood, no significant anxiety or depression  ENDOCRINE: no heat or cold intolerance     Physical Exam (visual exam)  VS:  no vital signs taken for video visit  GENERAL: healthy, alert and NAD, well dressed, answering questions appropriately  EYES: eyes grossly normal to inspection, conjunctivae and sclerae normal, no exophthalmos or proptosis  THYROID:  no apparent nodules or goiter  LUNGS: no audible wheeze, cough or visible cyanosis, no visible retractions or increased work of breathing  ABDOMEN: abdomen not evaluated  EXTREMITIES: no hand tremors, limited exam  NEUROLOGY: CN grossly intact, mentation intact and speech normal   PSYCH: mentation appears normal, affect normal/bright, judgement and insight intact, normal speech and appearance well groomed        LABS:     All pertinent notes, labs, and images personally reviewed by me.      A/P:          Encounter Diagnoses   Name       Type 1 diabetes mellitus with background retinopathy (H)       Insulin pump status           Comments:  Reviewed health history and diabetes issues  Improved glucose control with use of the Medtronic 670G pump and sensor system.  Reviewed and interpreted tests that I previously ordered.   Ordered appropriate tests for the  endocrinology disease management.    Management options discussed and implemented after shared medical decision making with the patient.     Plan:  Reviewed the overall T1DM management and insulin pump use.  Discussed optimal BG testing to assess glucose trends.  We reviewed insulin pump settings, basal rate and bolus dosing  Use of automated pump bolus dosing for meal/snack carb & correction dosing  Discussed use of the Guardian3 glucose sensor     Recommend:  Continue current Medtronic pump and CGMS sensor, diabetes management plan  Pump setting changes:   Bolus ICR:  11a 6.2 to 5.9, 5p 6.0 to 5.7    Continue use of the insulin pump auto-mode regularly  Use the Temp Target setting for aerobic exercise  Plan lab testing soon   Discussed the nearby [facility] clinic   Lab orders placed  Plan to take extra diabetes supplies (Nv vial, Tresiba pen, syringes, pen needles) on vacations, updated Nv pen Rx today  Continue lisinopril and simvastatin daily dose plan  Arrange annual dilated eye exam, fasting lipid panel testing.     Addressed patient's questions today.    Total time spent in with the patient evaluation:  20 min  Additional time spent reviewing pertinent lab tests and chart notes, and documentation:  10 min    Follow-up:  7/2021    ELIZABETH Schneider MD, MS  Endocrinology  [facility]      Video-Visit Details    Type of service:  Video Visit    Video End Time: 8:15 am    Originating Location (pt. Location):  home    Distant Location (provider location):  [facility]/home     Platform used for Video Visit: Mina

## 2021-03-18 DIAGNOSIS — E10.3299 TYPE 1 DIABETES MELLITUS WITH BACKGROUND RETINOPATHY (H): ICD-10-CM

## 2021-03-18 LAB — HBA1C MFR BLD: 8.2 % (ref 0–5.6)

## 2021-03-18 PROCEDURE — 80048 BASIC METABOLIC PNL TOTAL CA: CPT | Performed by: INTERNAL MEDICINE

## 2021-03-18 PROCEDURE — 83036 HEMOGLOBIN GLYCOSYLATED A1C: CPT | Performed by: INTERNAL MEDICINE

## 2021-03-18 PROCEDURE — 82043 UR ALBUMIN QUANTITATIVE: CPT | Performed by: INTERNAL MEDICINE

## 2021-03-18 PROCEDURE — 84443 ASSAY THYROID STIM HORMONE: CPT | Performed by: INTERNAL MEDICINE

## 2021-03-18 PROCEDURE — 36415 COLL VENOUS BLD VENIPUNCTURE: CPT | Performed by: INTERNAL MEDICINE

## 2021-03-19 LAB
ANION GAP SERPL CALCULATED.3IONS-SCNC: 4 MMOL/L (ref 3–14)
BUN SERPL-MCNC: 21 MG/DL (ref 7–30)
CALCIUM SERPL-MCNC: 8.4 MG/DL (ref 8.5–10.1)
CHLORIDE SERPL-SCNC: 101 MMOL/L (ref 94–109)
CO2 SERPL-SCNC: 29 MMOL/L (ref 20–32)
CREAT SERPL-MCNC: 1.22 MG/DL (ref 0.66–1.25)
CREAT UR-MCNC: 87 MG/DL
GFR SERPL CREATININE-BSD FRML MDRD: 72 ML/MIN/{1.73_M2}
GLUCOSE SERPL-MCNC: 271 MG/DL (ref 70–99)
MICROALBUMIN UR-MCNC: <5 MG/L
MICROALBUMIN/CREAT UR: NORMAL MG/G CR (ref 0–17)
POTASSIUM SERPL-SCNC: 4.6 MMOL/L (ref 3.4–5.3)
SODIUM SERPL-SCNC: 134 MMOL/L (ref 133–144)
TSH SERPL DL<=0.005 MIU/L-ACNC: 2.2 MU/L (ref 0.4–4)

## 2021-03-21 DIAGNOSIS — E10.3299 TYPE 1 DIABETES MELLITUS WITH BACKGROUND RETINOPATHY (H): Primary | ICD-10-CM

## 2021-03-21 DIAGNOSIS — Z96.41 INSULIN PUMP STATUS: ICD-10-CM

## 2021-04-05 ENCOUNTER — MEDICAL CORRESPONDENCE (OUTPATIENT)
Dept: HEALTH INFORMATION MANAGEMENT | Facility: CLINIC | Age: 41
End: 2021-04-05

## 2021-04-21 ENCOUNTER — MYC MEDICAL ADVICE (OUTPATIENT)
Dept: ENDOCRINOLOGY | Facility: CLINIC | Age: 41
End: 2021-04-21

## 2021-04-21 DIAGNOSIS — Z96.41 INSULIN PUMP STATUS: ICD-10-CM

## 2021-04-21 DIAGNOSIS — E10.3299 TYPE 1 DIABETES MELLITUS WITH BACKGROUND RETINOPATHY (H): Primary | ICD-10-CM

## 2021-05-17 ENCOUNTER — TRANSFERRED RECORDS (OUTPATIENT)
Dept: HEALTH INFORMATION MANAGEMENT | Facility: CLINIC | Age: 41
End: 2021-05-17

## 2021-05-17 LAB — RETINOPATHY: POSITIVE

## 2021-06-29 ENCOUNTER — MYC MEDICAL ADVICE (OUTPATIENT)
Dept: ENDOCRINOLOGY | Facility: CLINIC | Age: 41
End: 2021-06-29

## 2021-06-29 NOTE — TELEPHONE ENCOUNTER
Received form(s) from [employer] Diabetes Insulin-Treated form   Placed form(s) in/on TL's incoming basket.  Forms need to be filled out and signed and faxed to number listed on form.     Copy needs to be sent for scanning after completion: Yes     Faby Mortensen MA

## 2021-06-30 NOTE — TELEPHONE ENCOUNTER
Message reviewed.  I have received and reviewed this MNDMV form, completed it today.  It will be faxed back ASA.    ELIZABETH Schneider MD, MS  Endocrinology  [facility]

## 2021-07-19 ENCOUNTER — VIRTUAL VISIT (OUTPATIENT)
Dept: ENDOCRINOLOGY | Facility: CLINIC | Age: 41
End: 2021-07-19
Payer: COMMERCIAL

## 2021-07-19 DIAGNOSIS — Z96.41 INSULIN PUMP STATUS: ICD-10-CM

## 2021-07-19 DIAGNOSIS — E10.9 TYPE 1 DIABETES MELLITUS WITHOUT COMPLICATION (H): ICD-10-CM

## 2021-07-19 DIAGNOSIS — E10.3299 TYPE 1 DIABETES MELLITUS WITH BACKGROUND RETINOPATHY (H): Primary | ICD-10-CM

## 2021-07-19 PROCEDURE — 99214 OFFICE O/P EST MOD 30 MIN: CPT | Mod: 95 | Performed by: INTERNAL MEDICINE

## 2021-07-19 PROCEDURE — 95251 CONT GLUC MNTR ANALYSIS I&R: CPT | Performed by: INTERNAL MEDICINE

## 2021-07-19 NOTE — PROGRESS NOTES
Patient is being evaluated via a billable video visit.      How would you like to obtain your AVS? Reviewed verbally  If the video visit is dropped, the invitation should be resent by cell phone  Will anyone else be joining your video visit? no      Video Start Time: 3:02 pm    Video-Visit Details    Type of service:  Video Visit    Video End Time:  3:22 pm    Originating Location (pt. Location): home    Distant Location (provider location):  [facility]    Platform used for Video Visit:  [email or web address]      Recent issues:  Diabetes follow-up evaluation  Upgraded to Medtronic 770G pump Spring 2021  Feeling well overall but had left fingers 1-3, then neck MRI completed   Had neck cortisone injection early 6/2021, and doing PT  Feeling well overall, no recent health issues reported           1990. Diagnosis of diabetes, age 10, living in [place]  Began insulin treatment with insulin injections, recalls taking NPH and Regular insulins  Treatment with multiple daily injection (MDI) plan  Has seen me for diabetes evaluations at my former clinic ([facility])  2008. Switched to Medtronic insulin pump  Subsequent upgrades to other Medtronic pumps  Had used Surgient[employer] CGMS sensor  8/2017. Upgraded to Medtronic 670G pump with Guardian3 sensor  Spring 2021. Upgraded to Medtronic 770G pump              Novolog in pump     Uses [employer] Contour Link meter, tests 3-4x/day  Perceives good hypoglycemia awareness symptoms  Current pump settings:       Recent pump Carelink report:        Recent [facility] labs include:  Lab Results   Component Value Date    A1C 8.2 (H) 03/18/2021     03/18/2021    POTASSIUM 4.6 03/18/2021    CHLORIDE 101 03/18/2021    CO2 29 03/18/2021    ANIONGAP 4 03/18/2021     (H) 03/18/2021    BUN 21 03/18/2021    CR 1.22 03/18/2021    GFRESTIMATED 72 03/18/2021    GFRESTBLACK 84 03/18/2021    WILBERTO 8.4 (L) 03/18/2021    CHOL 177 06/15/2020    TRIG 79 06/15/2020    HDL 70 06/15/2020    LDL 91  06/15/2020    NHDL 107 06/15/2020    UCRR 87 03/18/2021    MICROL <5 03/18/2021    UMALCR Unable to calculate due to low value 03/18/2021     Lab Results   Component Value Date    TSH 2.20 03/18/2021       Goes to [facility], Dr. Armenta, BDR noted 4/2019  DM Complications:               Retinopathy                          BDR noted 3/2018        , 2 children, lives in [place].   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 Kevyn Mcintyre/[facility] [place]        ROS: 10 point ROS neg other than the symptoms noted above in the HPI.     GENERAL: energy good, ; denies fevers, chills, malaise, night sweats.   HEENT: no dysphagia, odonophagia, diplopia, neck pain or tenderness  THYROID:  no apparent hyper or hypothyroid symptoms  CV: no chest pain, pressure, palpitations, skipped beats  LUNGS: no SOB, VÁZQUEZ, cough, sputum production, wheezing   ABDOMEN: no diarrhea, constipation, abdominal pain  EXTREMITIES: no apparent rashes, ulcers, edema  NEUROLOGY: no headaches, denies changes in vision, tingling, extremitiy numbness   MSK: occasional ankle (achilles?) pains; no other muscle aches or pains, weakness  SKIN: increased sweating; no rashes or lesions  PSYCH:  stable mood, no significant anxiety or depression  ENDOCRINE: no heat or cold intolerance     Physical Exam (visual exam)  VS:  no vital signs taken for video visit  CONSTITUTIONAL: healthy, alert and NAD, well dressed, answering questions appropriately  ENT: no nose swelling or nasal discharge, mouth redness or gum changes.  EYES: eyes grossly normal to inspection, conjunctivae and sclerae normal, no exophthalmos or proptosis  THYROID:  no apparent nodules or goiter  LUNGS: no audible wheeze, cough or visible cyanosis, no visible retractions or increased work of breathing  ABDOMEN: abdomen not evaluated  EXTREMITIES: no hand tremors, limited exam  NEUROLOGY: CN grossly intact, mentation intact and speech normal   SKIN:  no apparent  skin lesions, rash, or edema with visualized skin appearance  PSYCH: mentation appears normal, affect normal/bright, judgement and insight intact,   normal speech and appearance well groomed    LABS:     All pertinent notes, labs, and images personally reviewed by me.      A/P:          Encounter Diagnoses   Name       Type 1 diabetes mellitus with background retinopathy (H)       Insulin pump status           Comments:  Reviewed health history and diabetes issues  Improved glucose control with use of the Medtronic 670G pump and sensor system.  Reviewed and interpreted tests that I previously ordered.   Ordered appropriate tests for the endocrinology disease management.    Management options discussed and implemented after shared medical decision making with the patient.  T1DM problem is chronic-stable    Plan:  Reviewed the overall T1DM management and insulin pump use.  Discussed optimal BG testing to assess glucose trends.  We reviewed insulin pump settings, basal rate and bolus dosing  Use of automated pump bolus dosing for meal/snack carb & correction dosing  Discussed recent Guardian3 glucose sensor trend data, in detail     Recommend:  Continue current Medtronic pump and CGMS sensor, diabetes management plan  Pump setting changes:   Bolus ICR 5pm 5.7 to 5.4    Continue use of the insulin pump auto-mode regularly  Use the Temp Target setting for aerobic exercise  Plan fasting lab testing soon   Discussed the nearby  [facility] clinic   Lab orders placed  Plan to take extra diabetes supplies (Nv vial, Tresiba pen, syringes, pen needles) on vacations  Message me when ready for Accu-check Guide test strips, compatible with the new pump  Continue lisinopril and simvastatin daily dose plan  Arrange annual dilated eye exam, fasting lipid panel testing.     Keep f/u evaluation with PCP or neurologist to review cervical radiculopathy management  Addressed patient's questions today.    There are no Patient Instructions on  file for this visit.    Future labs ordered today:   Orders Placed This Encounter   Procedures     GLUCOSE MONITOR, 72 HOUR, PHYS INTERP     Lipid panel reflex to direct LDL Fasting     Hemoglobin A1c     Basic metabolic panel     Radiology/Consults ordered today: None    Total time spent in with the patient evaluation:  20 min  Additional time spent reviewing pertinent lab tests and chart notes, and documentation:  10 min    Follow-up:  11/2021, Return    ELIZABETH Schneider MD, MS  Endocrinology  [facility]

## 2021-07-19 NOTE — LETTER
7/19/2021         RE: Mateo Rodriguez  [address]  [place] [id number]        Dear Colleague,    Thank you for referring your patient, Mateo Rodriguez, to the [facility]. Please see a copy of my visit note below.    Patient is being evaluated via a billable video visit.      How would you like to obtain your AVS? Reviewed verbally  If the video visit is dropped, the invitation should be resent by cell phone  Will anyone else be joining your video visit? no      Video Start Time: 3:02 pm    Video-Visit Details    Type of service:  Video Visit    Video End Time:  3:22 pm    Originating Location (pt. Location): home    Distant Location (provider location):  [facility]/[place]    Platform used for Video Visit:  [employer]      Recent issues:  Diabetes follow-up evaluation  Upgraded to Medtronic 770G pump Spring 2021  Feeling well overall but had left fingers 1-3, then neck MRI completed   Had neck cortisone injection early 6/2021, and doing PT  Feeling well overall, no recent health issues reported           1990. Diagnosis of diabetes, age 10, living in [place]  Began insulin treatment with insulin injections, recalls taking NPH and Regular insulins  Treatment with multiple daily injection (MDI) plan  Has seen me for diabetes evaluations at my former clinic ([facility])  2008. Switched to Medtronic insulin pump  Subsequent upgrades to other Medtronic pumps  Had used Enlite CGMS sensor  8/2017. Upgraded to Medtronic 670G pump with Guardian3 sensor  Spring 2021. Upgraded to Medtronic 770G pump              Novolog in pump     Uses [employer] Contour Link meter, tests 3-4x/day  Perceives good hypoglycemia awareness symptoms  Current pump settings:       Recent pump Carelink report:        Recent [facility] labs include:  Lab Results   Component Value Date    A1C 8.2 (H) 03/18/2021     03/18/2021    POTASSIUM 4.6 03/18/2021    CHLORIDE 101 03/18/2021    CO2 29  03/18/2021    ANIONGAP 4 03/18/2021     (H) 03/18/2021    BUN 21 03/18/2021    CR 1.22 03/18/2021    GFRESTIMATED 72 03/18/2021    GFRESTBLACK 84 03/18/2021    WILBERTO 8.4 (L) 03/18/2021    CHOL 177 06/15/2020    TRIG 79 06/15/2020    HDL 70 06/15/2020    LDL 91 06/15/2020    NHDL 107 06/15/2020    UCRR 87 03/18/2021    MICROL <5 03/18/2021    UMALCR Unable to calculate due to low value 03/18/2021     Lab Results   Component Value Date    TSH 2.20 03/18/2021       Goes to [facility], Dr. Armenta, BDR noted 4/2019  DM Complications:               Retinopathy                          BDR noted 3/2018        , 2 children, lives in [place].   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     GENERAL: energy good, ; denies fevers, chills, malaise, night sweats.   HEENT: no dysphagia, odonophagia, diplopia, neck pain or tenderness  THYROID:  no apparent hyper or hypothyroid symptoms  CV: no chest pain, pressure, palpitations, skipped beats  LUNGS: no SOB, VÁZQUEZ, cough, sputum production, wheezing   ABDOMEN: no diarrhea, constipation, abdominal pain  EXTREMITIES: no apparent rashes, ulcers, edema  NEUROLOGY: no headaches, denies changes in vision, tingling, extremitiy numbness   MSK: occasional ankle (achilles?) pains; no other muscle aches or pains, weakness  SKIN: increased sweating; no rashes or lesions  PSYCH:  stable mood, no significant anxiety or depression  ENDOCRINE: no heat or cold intolerance     Physical Exam (visual exam)  VS:  no vital signs taken for video visit  CONSTITUTIONAL: healthy, alert and NAD, well dressed, answering questions appropriately  ENT: no nose swelling or nasal discharge, mouth redness or gum changes.  EYES: eyes grossly normal to inspection, conjunctivae and sclerae normal, no exophthalmos or proptosis  THYROID:  no apparent nodules or goiter  LUNGS:  no audible wheeze, cough or visible cyanosis, no visible retractions or increased work of breathing  ABDOMEN: abdomen not evaluated  EXTREMITIES: no hand tremors, limited exam  NEUROLOGY: CN grossly intact, mentation intact and speech normal   SKIN:  no apparent skin lesions, rash, or edema with visualized skin appearance  PSYCH: mentation appears normal, affect normal/bright, judgement and insight intact,   normal speech and appearance well groomed    LABS:     All pertinent notes, labs, and images personally reviewed by me.      A/P:          Encounter Diagnoses   Name       Type 1 diabetes mellitus with background retinopathy (H)       Insulin pump status           Comments:  Reviewed health history and diabetes issues  Improved glucose control with use of the Medtronic 670G pump and sensor system.  Reviewed and interpreted tests that I previously ordered.   Ordered appropriate tests for the endocrinology disease management.    Management options discussed and implemented after shared medical decision making with the patient.  T1DM problem is chronic-stable    Plan:  Reviewed the overall T1DM management and insulin pump use.  Discussed optimal BG testing to assess glucose trends.  We reviewed insulin pump settings, basal rate and bolus dosing  Use of automated pump bolus dosing for meal/snack carb & correction dosing  Discussed recent Guardian3 glucose sensor trend data, in detail     Recommend:  Continue current Medtronic pump and CGMS sensor, diabetes management plan  Pump setting changes:   Bolus ICR 5pm 5.7 to 5.4    Continue use of the insulin pump auto-mode regularly  Use the Temp Target setting for aerobic exercise  Plan fasting lab testing soon   Discussed the nearby [facility]   Lab orders placed  Plan to take extra diabetes supplies (Nv vial, Tresiba pen, syringes, pen needles) on vacations  Message me when ready for Accu-check Guide test strips, compatible with the new pump  Continue lisinopril and  simvastatin daily dose plan  Arrange annual dilated eye exam, fasting lipid panel testing.     Keep f/u evaluation with PCP or neurologist to review cervical radiculopathy management  Addressed patient's questions today.    There are no Patient Instructions on file for this visit.    Future labs ordered today:   Orders Placed This Encounter   Procedures     GLUCOSE MONITOR, 72 HOUR, PHYS INTERP     Lipid panel reflex to direct LDL Fasting     Hemoglobin A1c     Basic metabolic panel     Radiology/Consults ordered today: None    Total time spent in with the patient evaluation:  20 min  Additional time spent reviewing pertinent lab tests and chart notes, and documentation:  10 min    Follow-up:  11/2021, Return    ELIZABETH Schneider MD, MS  Endocrinology  [facility]                Again, thank you for allowing me to participate in the care of your patient.        Sincerely,        Onur Schneider MD

## 2021-07-27 ENCOUNTER — LAB (OUTPATIENT)
Dept: LAB | Facility: CLINIC | Age: 41
End: 2021-07-27
Payer: COMMERCIAL

## 2021-07-27 DIAGNOSIS — E10.3299 TYPE 1 DIABETES MELLITUS WITH BACKGROUND RETINOPATHY (H): ICD-10-CM

## 2021-07-27 DIAGNOSIS — Z96.41 INSULIN PUMP STATUS: ICD-10-CM

## 2021-07-27 LAB — HBA1C MFR BLD: 7.8 % (ref 0–5.6)

## 2021-07-27 PROCEDURE — 80048 BASIC METABOLIC PNL TOTAL CA: CPT

## 2021-07-27 PROCEDURE — 80061 LIPID PANEL: CPT

## 2021-07-27 PROCEDURE — 83036 HEMOGLOBIN GLYCOSYLATED A1C: CPT

## 2021-07-27 PROCEDURE — 36415 COLL VENOUS BLD VENIPUNCTURE: CPT

## 2021-07-28 LAB
ANION GAP SERPL CALCULATED.3IONS-SCNC: 8 MMOL/L (ref 3–14)
BUN SERPL-MCNC: 21 MG/DL (ref 7–30)
CALCIUM SERPL-MCNC: 8.8 MG/DL (ref 8.5–10.1)
CHLORIDE BLD-SCNC: 100 MMOL/L (ref 94–109)
CHOLEST SERPL-MCNC: 188 MG/DL
CO2 SERPL-SCNC: 26 MMOL/L (ref 20–32)
CREAT SERPL-MCNC: 1.09 MG/DL (ref 0.66–1.25)
FASTING STATUS PATIENT QL REPORTED: YES
GFR SERPL CREATININE-BSD FRML MDRD: 83 ML/MIN/1.73M2
GLUCOSE BLD-MCNC: 268 MG/DL (ref 70–99)
HDLC SERPL-MCNC: 83 MG/DL
LDLC SERPL CALC-MCNC: 95 MG/DL
NONHDLC SERPL-MCNC: 105 MG/DL
POTASSIUM BLD-SCNC: 4.7 MMOL/L (ref 3.4–5.3)
SODIUM SERPL-SCNC: 134 MMOL/L (ref 133–144)
TRIGL SERPL-MCNC: 51 MG/DL

## 2021-09-05 ENCOUNTER — HEALTH MAINTENANCE LETTER (OUTPATIENT)
Age: 41
End: 2021-09-05

## 2021-09-30 DIAGNOSIS — I10 ESSENTIAL HYPERTENSION: ICD-10-CM

## 2021-09-30 RX ORDER — LISINOPRIL 10 MG/1
TABLET ORAL
Qty: 90 TABLET | Refills: 0 | Status: SHIPPED | OUTPATIENT
Start: 2021-09-30 | End: 2021-12-27

## 2021-10-25 DIAGNOSIS — E78.5 HYPERLIPIDEMIA LDL GOAL <100: ICD-10-CM

## 2021-10-25 DIAGNOSIS — E10.3299 TYPE 1 DIABETES MELLITUS WITH BACKGROUND RETINOPATHY (H): Primary | ICD-10-CM

## 2021-10-25 RX ORDER — SIMVASTATIN 40 MG
40 TABLET ORAL EVERY EVENING
Qty: 90 TABLET | Refills: 3 | Status: SHIPPED | OUTPATIENT
Start: 2021-10-25 | End: 2022-09-14

## 2021-10-27 DIAGNOSIS — I10 ESSENTIAL HYPERTENSION: ICD-10-CM

## 2021-11-01 RX ORDER — LISINOPRIL 10 MG/1
TABLET ORAL
Qty: 90 TABLET | Refills: 0 | OUTPATIENT
Start: 2021-11-01

## 2021-12-22 ENCOUNTER — VIRTUAL VISIT (OUTPATIENT)
Dept: ENDOCRINOLOGY | Facility: CLINIC | Age: 41
End: 2021-12-22
Payer: COMMERCIAL

## 2021-12-22 DIAGNOSIS — E10.3299 TYPE 1 DIABETES MELLITUS WITH BACKGROUND RETINOPATHY (H): ICD-10-CM

## 2021-12-22 DIAGNOSIS — Z96.41 INSULIN PUMP STATUS: Primary | ICD-10-CM

## 2021-12-22 PROCEDURE — 99214 OFFICE O/P EST MOD 30 MIN: CPT | Mod: 95 | Performed by: INTERNAL MEDICINE

## 2021-12-22 PROCEDURE — 95251 CONT GLUC MNTR ANALYSIS I&R: CPT | Performed by: INTERNAL MEDICINE

## 2021-12-22 NOTE — PROGRESS NOTES
Patient is being evaluated via a billable video visit.      How would you like to obtain your AVS? Reviewed verbally  If the video visit is dropped, the invitation should be resent by cell phone  Will anyone else be joining your video visit? no      Video Start Time:  3:35 pm    Video-Visit Details    Type of service:  Video Visit    Video End Time:  3:52 pm    Originating Location (pt. Location): home    Distant Location (provider location):  [facility] SPECIALTY [facility]/[place]    Platform used for Video Visit:  [employer]        Recent issues:  Diabetes follow-up evaluation  Upgraded to Medtronic 770G pump Spring 2021  ED vs hospitalized for DKA 8/2021 following trip to [place], likely due to pump malfunction problem  Feeling well recently, no other new health issues reported           1990. Diagnosis of diabetes, age 10, living in [place]  Began insulin treatment with insulin injections, recalls taking NPH and Regular insulins  Treatment with multiple daily injection (MDI) plan  Has seen me for diabetes evaluations at my former clinic ([facility])  2008. Switched to Medtronic insulin pump  Subsequent upgrades to other Medtronic pumps  Had used OptMedite CGMS sensor  8/2017. Upgraded to Medtronic 670G pump with Guardian3 sensor  Spring 2021. Upgraded to Medtronic 770G pump              Novolog in pump     Uses [employer] Contour Link meter, tests 3-4x/day  Perceives good hypoglycemia awareness symptoms  Current pump settings:       Recent pump Carelink report:            Recent [facility] labs include:  Lab Results   Component Value Date    A1C 7.8 (H) 07/27/2021     07/27/2021    POTASSIUM 4.7 07/27/2021    CHLORIDE 100 07/27/2021    CO2 26 07/27/2021    ANIONGAP 8 07/27/2021     (H) 07/27/2021    BUN 21 07/27/2021    CR 1.09 07/27/2021    GFRESTIMATED 83 07/27/2021    GFRESTBLACK 84 03/18/2021    WILBERTO 8.8 07/27/2021    CHOL 188 07/27/2021    TRIG 51 07/27/2021    HDL 83 07/27/2021    LDL 95 07/27/2021    NHDL 105  07/27/2021    UCRR 87 03/18/2021    MICROL <5 03/18/2021    UMALCR Unable to calculate due to low value 03/18/2021     Lab Results   Component Value Date    TSH 2.20 03/18/2021       Goes to [facility], Dr. Armenta, BDR noted 5/2021  DM Complications:               Retinopathy                          BDR noted 3/2018        , 2 children, lives in [place].   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 Kevyn Mcintyre/ASHLEY [place]      PMH/PSH:  Past Medical History:   Diagnosis Date     Cervical radiculopathy     finger tingling     Insulin pump status      Type 1 diabetes (H)      No past surgical history on file.    Family Hx:  Family History   Problem Relation Age of Onset     Other Cancer Father      Heart Failure Maternal Grandfather          Social Hx:  Social History     Socioeconomic History     Marital status:      Spouse name: Not on file     Number of children: Not on file     Years of education: Not on file     Highest education level: Not on file   Occupational History     Not on file   Tobacco Use     Smoking status: Never Smoker     Smokeless tobacco: Never Used   Substance and Sexual Activity     Alcohol use: Yes     Drug use: No     Sexual activity: Not on file   Other Topics Concern     Parent/sibling w/ CABG, MI or angioplasty before 65F 55M? Not Asked   Social History Narrative     Not on file     Social Determinants of Health     Financial Resource Strain: Not on file   Food Insecurity: Not on file   Transportation Needs: Not on file   Physical Activity: Not on file   Stress: Not on file   Social Connections: Not on file   Intimate Partner Violence: Not on file   Housing Stability: Not on file          MEDICATIONS:  has a current medication list which includes the following prescription(s): acetone urine, contour next test, insulin pump, lisinopril, novolog vial, simvastatin, blood glucose, insulin aspart, insulin degludec, insulin pen needle, and  insulin syringe-needle u-100.       ROS: 10 point ROS neg other than the symptoms noted above in the HPI.     GENERAL: energy good, ; denies fevers, chills, malaise, night sweats.   HEENT: no dysphagia, odonophagia, diplopia, neck pain or tenderness  THYROID:  no apparent hyper or hypothyroid symptoms  CV: no chest pain, pressure, palpitations, skipped beats  LUNGS: no SOB, VÁZQUEZ, cough, sputum production, wheezing   ABDOMEN: no diarrhea, constipation, abdominal pain  EXTREMITIES: no apparent rashes, ulcers, edema  NEUROLOGY: no headaches, denies changes in vision, tingling, extremitiy numbness   MSK: occasional ankle (achilles?) pains; no other muscle aches or pains, weakness  SKIN: increased sweating; no rashes or lesions  PSYCH:  stable mood, no significant anxiety or depression  ENDOCRINE: no heat or cold intolerance     Physical Exam (visual exam)  VS:  no vital signs taken for video visit  CONSTITUTIONAL: healthy, alert and NAD, well dressed, answering questions appropriately  ENT: no nose swelling or nasal discharge, mouth redness or gum changes.  EYES: eyes grossly normal to inspection, conjunctivae and sclerae normal, no exophthalmos or proptosis  THYROID:  no apparent nodules or goiter  LUNGS: no audible wheeze, cough or visible cyanosis, no visible retractions or increased work of breathing  ABDOMEN: abdomen not evaluated  EXTREMITIES: no hand tremors, limited exam  NEUROLOGY: CN grossly intact, mentation intact and speech normal   SKIN:  no apparent skin lesions, rash, or edema with visualized skin appearance  PSYCH: mentation appears normal, affect normal/bright, judgement and insight intact,   normal speech and appearance well groomed    LABS:     All pertinent notes, labs, and images personally reviewed by me.        A/P:  Encounter Diagnoses   Name Primary?     Type 1 diabetes mellitus with background retinopathy (H)      Insulin pump status Yes     Comments:  Reviewed health history and diabetes  issues  Improved glucose control with use of the Medtronic 770G pump and sensor system.  Reviewed and interpreted tests that I previously ordered.   Ordered appropriate tests for the endocrinology disease management.    Management options discussed and implemented after shared medical decision making with the patient.  T1DM problem is chronic-stable    Plan:  Reviewed the overall T1DM management and insulin pump use.  Discussed optimal BG testing to assess glucose trends.  We reviewed insulin pump settings, basal rate and bolus dosing  Use of automated pump bolus dosing for meal/snack carb & correction dosing  Discussed recent Guardian3 glucose sensor trend data, in detail     Recommend:  Continue current Medtronic pump and CGM sensor, diabetes management plan  Pump setting changes:   Bolus ICR 5p 5.4 to 5.2     Continue use of the insulin pump auto-mode regularly  Use the Temp Target setting for aerobic exercise  Plan fasting lab testing soon   Discussed the nearby  PL clinic   Lab orders placed  Plan to take extra diabetes supplies (Nv vial, Tresiba pen, syringes, pen needles) on vacations  Message me when ready for Accu-check Guide test strips, compatible with the new pump  Continue lisinopril and simvastatin daily dose plan  Arrange annual dilated eye exam, fasting lipid panel testing.  Needs follow-up endocrinology appointments every -4 months, with next Spring '22 appt in-person at our clinic.     Keep f/u evaluation with PCP or neurologist to review cervical radiculopathy management  Addressed patient's questions today.    There are no Patient Instructions on file for this visit.    Future labs ordered today:   Orders Placed This Encounter   Procedures     GLUCOSE MONITOR, 72 HOUR, PHYS INTERP     Albumin Random Urine Quantitative with Creat Ratio     Basic metabolic panel     Hemoglobin A1c     Radiology/Consults ordered today: None    Total time spent in with the patient evaluation:  17 min  Additional  time spent reviewing pertinent lab tests and chart notes, and documentation:  13 min    Follow-up:  4/19/22 at 11am, Return    ELIZABETH Schneider MD, MS  Endocrinology  [facility]

## 2021-12-22 NOTE — LETTER
12/22/2021         RE: Mateo Rodriguez  [address]  [facility] [facility] [address]        Dear Colleague,    Thank you for referring your patient, Mateo Rodriguez, to the [facility]. Please see a copy of my visit note below.    Patient is being evaluated via a billable video visit.      How would you like to obtain your AVS? Reviewed verbally  If the video visit is dropped, the invitation should be resent by cell phone  Will anyone else be joining your video visit? no      Video Start Time:  3:35 pm    Video-Visit Details    Type of service:  Video Visit    Video End Time:  3:52 pm    Originating Location (pt. Location): home    Distant Location (provider location):  [facility]/[place]    Platform used for Video Visit:  [employer]        Recent issues:  Diabetes follow-up evaluation  Upgraded to Medtronic 770G pump Spring 2021  ED vs hospitalized for DKA 8/2021 following trip to [place], likely due to pump malfunction problem  Feeling well recently, no other new health issues reported           1990. Diagnosis of diabetes, age 10, living in [place]  Began insulin treatment with insulin injections, recalls taking NPH and Regular insulins  Treatment with multiple daily injection (MDI) plan  Has seen me for diabetes evaluations at my former clinic ([facility])  2008. Switched to Medtronic insulin pump  Subsequent upgrades to other Medtronic pumps  Had used Enlite CGMS sensor  8/2017. Upgraded to Medtronic 670G pump with Guardian3 sensor  Spring 2021. Upgraded to Medtronic 770G pump              Novolog in pump     Uses [employer] Contour Link meter, tests 3-4x/day  Perceives good hypoglycemia awareness symptoms  Current pump settings:       Recent pump Carelink report:            Recent [facility] labs include:  Lab Results   Component Value Date    A1C 7.8 (H) 07/27/2021     07/27/2021    POTASSIUM 4.7 07/27/2021    CHLORIDE 100 07/27/2021    CO2 26 07/27/2021    ANIONGAP 8  07/27/2021     (H) 07/27/2021    BUN 21 07/27/2021    CR 1.09 07/27/2021    GFRESTIMATED 83 07/27/2021    GFRESTBLACK 84 03/18/2021    WILBERTO 8.8 07/27/2021    CHOL 188 07/27/2021    TRIG 51 07/27/2021    HDL 83 07/27/2021    LDL 95 07/27/2021    NHDL 105 07/27/2021    UCRR 87 03/18/2021    MICROL <5 03/18/2021    UMALCR Unable to calculate due to low value 03/18/2021     Lab Results   Component Value Date    TSH 2.20 03/18/2021       Goes to [facility], Dr. Armenta, BDR noted 5/2021  DM Complications:               Retinopathy                          BDR noted 3/2018        , 2 children, lives in [place].   Works as executive with [employer]  Sees Dr. Kevyn Mcintyre/[facility] [place]      PMH/PSH:  Past Medical History:   Diagnosis Date     Cervical radiculopathy     finger tingling     Insulin pump status      Type 1 diabetes (H)      No past surgical history on file.    Family Hx:  Family History   Problem Relation Age of Onset     Other Cancer Father      Heart Failure Maternal Grandfather          Social Hx:  Social History     Socioeconomic History     Marital status:      Spouse name: Not on file     Number of children: Not on file     Years of education: Not on file     Highest education level: Not on file   Occupational History     Not on file   Tobacco Use     Smoking status: Never Smoker     Smokeless tobacco: Never Used   Substance and Sexual Activity     Alcohol use: Yes     Drug use: No     Sexual activity: Not on file   Other Topics Concern     Parent/sibling w/ CABG, MI or angioplasty before 65F 55M? Not Asked   Social History Narrative     Not on file     Social Determinants of Health     Financial Resource Strain: Not on file   Food Insecurity: Not on file   Transportation Needs: Not on file   Physical Activity: Not on file   Stress: Not on file   Social Connections: Not on file   Intimate Partner Violence: Not on file   Housing Stability: Not on file           MEDICATIONS:  has a current medication list which includes the following prescription(s): acetone urine, contour next test, insulin pump, lisinopril, novolog vial, simvastatin, blood glucose, insulin aspart, insulin degludec, insulin pen needle, and insulin syringe-needle u-100.       ROS: 10 point ROS neg other than the symptoms noted above in the HPI.     GENERAL: energy good, ; denies fevers, chills, malaise, night sweats.   HEENT: no dysphagia, odonophagia, diplopia, neck pain or tenderness  THYROID:  no apparent hyper or hypothyroid symptoms  CV: no chest pain, pressure, palpitations, skipped beats  LUNGS: no SOB, VÁZQUEZ, cough, sputum production, wheezing   ABDOMEN: no diarrhea, constipation, abdominal pain  EXTREMITIES: no apparent rashes, ulcers, edema  NEUROLOGY: no headaches, denies changes in vision, tingling, extremitiy numbness   MSK: occasional ankle (achilles?) pains; no other muscle aches or pains, weakness  SKIN: increased sweating; no rashes or lesions  PSYCH:  stable mood, no significant anxiety or depression  ENDOCRINE: no heat or cold intolerance     Physical Exam (visual exam)  VS:  no vital signs taken for video visit  CONSTITUTIONAL: healthy, alert and NAD, well dressed, answering questions appropriately  ENT: no nose swelling or nasal discharge, mouth redness or gum changes.  EYES: eyes grossly normal to inspection, conjunctivae and sclerae normal, no exophthalmos or proptosis  THYROID:  no apparent nodules or goiter  LUNGS: no audible wheeze, cough or visible cyanosis, no visible retractions or increased work of breathing  ABDOMEN: abdomen not evaluated  EXTREMITIES: no hand tremors, limited exam  NEUROLOGY: CN grossly intact, mentation intact and speech normal   SKIN:  no apparent skin lesions, rash, or edema with visualized skin appearance  PSYCH: mentation appears normal, affect normal/bright, judgement and insight intact,   normal speech and appearance well groomed    LABS:     All  pertinent notes, labs, and images personally reviewed by me.        A/P:  Encounter Diagnoses   Name Primary?     Type 1 diabetes mellitus with background retinopathy (H)      Insulin pump status Yes     Comments:  Reviewed health history and diabetes issues  Improved glucose control with use of the Medtronic 770G pump and sensor system.  Reviewed and interpreted tests that I previously ordered.   Ordered appropriate tests for the endocrinology disease management.    Management options discussed and implemented after shared medical decision making with the patient.  T1DM problem is chronic-stable    Plan:  Reviewed the overall T1DM management and insulin pump use.  Discussed optimal BG testing to assess glucose trends.  We reviewed insulin pump settings, basal rate and bolus dosing  Use of automated pump bolus dosing for meal/snack carb & correction dosing  Discussed recent Guardian3 glucose sensor trend data, in detail     Recommend:  Continue current Medtronic pump and CGM sensor, diabetes management plan  Pump setting changes:   Bolus ICR 5p 5.4 to 5.2     Continue use of the insulin pump auto-mode regularly  Use the Temp Target setting for aerobic exercise  Plan fasting lab testing soon   Discussed the nearby  PL clinic   Lab orders placed  Plan to take extra diabetes supplies (Nv vial, Tresiba pen, syringes, pen needles) on vacations  Message me when ready for Accu-check Guide test strips, compatible with the new pump  Continue lisinopril and simvastatin daily dose plan  Arrange annual dilated eye exam, fasting lipid panel testing.  Needs follow-up endocrinology appointments every -4 months, with next Spring '22 appt in-person at our clinic.     Keep f/u evaluation with PCP or neurologist to review cervical radiculopathy management  Addressed patient's questions today.    There are no Patient Instructions on file for this visit.    Future labs ordered today:   Orders Placed This Encounter   Procedures      GLUCOSE MONITOR, 72 HOUR, PHYS INTERP     Albumin Random Urine Quantitative with Creat Ratio     Basic metabolic panel     Hemoglobin A1c     Radiology/Consults ordered today: None    Total time spent in with the patient evaluation:  17 min  Additional time spent reviewing pertinent lab tests and chart notes, and documentation:  13 min    Follow-up:  4/19/22 at 11am, Return    ELIZABETH Schneider MD, MS  Endocrinology  [facility]                    Again, thank you for allowing me to participate in the care of your patient.        Sincerely,        Onur Schneider MD

## 2022-01-28 ENCOUNTER — LAB (OUTPATIENT)
Dept: LAB | Facility: CLINIC | Age: 42
End: 2022-01-28
Payer: COMMERCIAL

## 2022-01-28 DIAGNOSIS — E10.3299 TYPE 1 DIABETES MELLITUS WITH BACKGROUND RETINOPATHY (H): ICD-10-CM

## 2022-01-28 LAB
ALT SERPL W P-5'-P-CCNC: 41 U/L (ref 0–70)
ANION GAP SERPL CALCULATED.3IONS-SCNC: 4 MMOL/L (ref 3–14)
BUN SERPL-MCNC: 14 MG/DL (ref 7–30)
CALCIUM SERPL-MCNC: 8.9 MG/DL (ref 8.5–10.1)
CHLORIDE BLD-SCNC: 105 MMOL/L (ref 94–109)
CO2 SERPL-SCNC: 28 MMOL/L (ref 20–32)
CREAT SERPL-MCNC: 1.08 MG/DL (ref 0.66–1.25)
GFR SERPL CREATININE-BSD FRML MDRD: 87 ML/MIN/1.73M2
GLUCOSE BLD-MCNC: 220 MG/DL (ref 70–99)
HBA1C MFR BLD: 8 % (ref 0–5.6)
POTASSIUM BLD-SCNC: 4.8 MMOL/L (ref 3.4–5.3)
SODIUM SERPL-SCNC: 137 MMOL/L (ref 133–144)

## 2022-01-28 PROCEDURE — 80048 BASIC METABOLIC PNL TOTAL CA: CPT

## 2022-01-28 PROCEDURE — 36415 COLL VENOUS BLD VENIPUNCTURE: CPT

## 2022-01-28 PROCEDURE — 83036 HEMOGLOBIN GLYCOSYLATED A1C: CPT

## 2022-01-28 PROCEDURE — 82043 UR ALBUMIN QUANTITATIVE: CPT

## 2022-01-28 PROCEDURE — 84460 ALANINE AMINO (ALT) (SGPT): CPT

## 2022-01-29 DIAGNOSIS — Z96.41 INSULIN PUMP STATUS: ICD-10-CM

## 2022-01-29 DIAGNOSIS — E10.3299 TYPE 1 DIABETES MELLITUS WITH BACKGROUND RETINOPATHY (H): Primary | ICD-10-CM

## 2022-01-29 LAB
CREAT UR-MCNC: 241 MG/DL
MICROALBUMIN UR-MCNC: 19 MG/L
MICROALBUMIN/CREAT UR: 7.88 MG/G CR (ref 0–17)

## 2022-02-08 ENCOUNTER — TELEPHONE (OUTPATIENT)
Dept: ENDOCRINOLOGY | Facility: CLINIC | Age: 42
End: 2022-02-08
Payer: COMMERCIAL

## 2022-02-08 NOTE — TELEPHONE ENCOUNTER
Diabetes Education Scheduling Outreach #1:    Call to patient to schedule. Left message with phone number to call to schedule.    Plan for 2nd outreach attempt within 1 week.    Sherri Medellin  [facility]  Diabetes and Nutrition Scheduling

## 2022-02-19 ENCOUNTER — HEALTH MAINTENANCE LETTER (OUTPATIENT)
Age: 42
End: 2022-02-19

## 2022-03-09 ENCOUNTER — VIRTUAL VISIT (OUTPATIENT)
Dept: EDUCATION SERVICES | Facility: CLINIC | Age: 42
End: 2022-03-09
Attending: INTERNAL MEDICINE
Payer: COMMERCIAL

## 2022-03-09 DIAGNOSIS — Z96.41 INSULIN PUMP STATUS: ICD-10-CM

## 2022-03-09 DIAGNOSIS — E10.3299 TYPE 1 DIABETES MELLITUS WITH BACKGROUND RETINOPATHY (H): ICD-10-CM

## 2022-03-09 PROCEDURE — G0108 DIAB MANAGE TRN  PER INDIV: HCPCS | Mod: 95

## 2022-03-09 NOTE — LETTER
3/9/2022         RE: Mateo Rodriguez  [address]  [facility] [id number]        Dear Colleague,    Thank you for referring your patient, Mateo Rodriguez, to the [facility]. Please see a copy of my visit note below.    Diabetes Self-Management Education & Support    Presents for: Insulin Pump and CGM Review    Type of Visit: Video Visit    How would patient like to obtain AVS? MyChart    ASSESSMENT:    Mateo presents for diabetes/pump review. Overall he thinks that things are going okay with his diabetes. He mentions that he is going on a 1 week diving trip 45 miles off of [facility]. He will be diving on and off throughout the week. He will not be able to wear his pump while he is in the water, but will put it back on when he gets out. He approximates that he will be off of his pump for about 4 hours/day, not consecutively. He will not be wearing his sensor so will be using his preset basal rates. Will plan to check BG with his fingerstick meter when not in water. We discussed needing to adjust his preset basal rates, as his auto-mode basal has been giving about 22 units/day and his preset basal rates would give him 31 units/day. Consulted with Dr. Schneider who recommended to decrease basal rates equally, by ~30% (31 units daily basal dose --> 22 units daily basal dose).   Basal rate change:  0000: 1.25 --> 0.88 unit(s)/hour  0700: 1.65 --> 1.17 unit(s)/hour  1200: 1.35 --> 0.96 unit(s)/hour  1500: 1.20 --> 0.85 unit(s)/hour    We discussed the extra supplies that he will need to bring on his trip. He states that he will check if he has glucagon, he does have ketone sticks, he will make sure that he has enough pump supplies and plans to bring an extra pump, will also bring Novolog pens an Tresiba pens.     He feels that his ISF might need to be decreased as he feels that his BG isn't coming down enough after correction. We also discussed a plan to slightly adjust his ICR at dinner  .   Made the following adjustments to pump settings:  ISF: 33 --> 31  ICR: 5p: 5.2 g/u --> 5.0 g/u    Mateo mentions the few instances where his sensor glucose dropped overnight, down to 40's. He states that he checked with a fingerstick and his BG was normal, up to 70 mg/dl above the sensor reading. Then the sensor wouldn't accept his calibration because it was significantly higher.  He thinks he maybe had a faulty sensor, plans to change it out.     Patient's most recent   Lab Results   Component Value Date    A1C 8.0 01/28/2022    A1C 8.2 03/18/2021    is not meeting goal of <7.0    Diabetes knowledge and skills assessment:   Patient is knowledgeable in diabetes management concepts related to: Healthy Eating, Being Active, Monitoring, Taking Medication, Problem Solving, Reducing Risks and Healthy Coping    Continue education with the following diabetes management concepts: Healthy Eating, Being Active, Monitoring, Taking Medication, Problem Solving, Reducing Risks and Healthy Coping    Based on learning assessment above, most appropriate setting for further diabetes education would be: Individual setting.    INTERVENTIONS:    Education provided today on:  AADE Self-Care Behaviors:  Healthy Eating: carbohydrate counting  Monitoring: log and interpret results and individual blood glucose targets  Taking Medication: proper site selection and rotation for injections and when to take medications  Problem Solving: high blood glucose - causes, signs/symptoms, treatment and prevention, low blood glucose - causes, signs/symptoms, treatment and prevention, carrying a carbohydrate source at all times and safe travel    Opportunities for ongoing education and support in diabetes-self management were discussed. Pt verbalized understanding of concepts discussed and recommendations provided today.       Education Materials Provided:  No new materials provided today      PLAN    Basal rate change:  0000: 1.25 --> 0.88  "unit(s)/hour  0700: 1.65 --> 1.17 unit(s)/hour  1200: 1.35 --> 0.96 unit(s)/hour  1500: 1.20 --> 0.85 unit(s)/hour    Changes to pump settings:  ISF: 33 --> 31  ICR: 5p: 5.2  --> 5.0     Topics to cover at upcoming visits: Healthy Eating, Monitoring, Taking Medication and Problem Solving  Follow-up: with dr. Schneider in April    See Goals Section for co-developed, patient-stated behavior change goals.  AVS provided to patient today.    SUBJECTIVE / OBJECTIVE:  Presents for: Insulin Pump and CGM Review  Accompanied by: Self  Diabetes education in the past 24mo: No  Diabetes type: Type 1  Date of diagnosis: Age 10  Transportation concerns: No  Difficulty affording diabetes medication?: No  Difficulty affording diabetes testing supplies?: No  Cultural Influences/Ethnic Background:  Not  or     Diabetes Symptoms & Complications:     Complications assessed today?: No    Patient Problem List and Family Medical History reviewed for relevant medical history, current medical status, and diabetes risk factors.    Vitals:  There were no vitals taken for this visit.  Estimated body mass index is 25.84 kg/m  as calculated from the following:    Height as of 6/12/20: 1.753 m (5' 9\").    Weight as of 6/12/20: 79.4 kg (175 lb).   Last 3 BP:   BP Readings from Last 3 Encounters:   10/29/19 112/70   02/05/19 119/80   09/07/18 121/82       History   Smoking Status     Never Smoker   Smokeless Tobacco     Never Used       Labs:  Lab Results   Component Value Date    A1C 8.0 01/28/2022    A1C 8.2 03/18/2021     Lab Results   Component Value Date     01/28/2022     03/18/2021     Lab Results   Component Value Date    LDL 95 07/27/2021    LDL 91 06/15/2020     HDL Cholesterol   Date Value Ref Range Status   06/15/2020 70 >39 mg/dL Final     Direct Measure HDL   Date Value Ref Range Status   07/27/2021 83 >=40 mg/dL Final     Comment:     0-19 years:       Greater than or equal to 45 mg/dL   Low: Less than 40 " mg/dL   Borderline low: 40-44 mg/dL     20 years and older:   Female: Greater than or equal to 50 mg/dL   Male:   Greater than or equal to 40 mg/dL        ]  GFR Estimate   Date Value Ref Range Status   01/28/2022 87 >60 mL/min/1.73m2 Final     Comment:     Effective December 21, 2021 eGFRcr in adults is calculated using the 2021 CKD-EPI creatinine equation which includes age and gender (Dyana et al., NEJ, DOI: 10.1056/MNIYzh4671304)   03/18/2021 72 >60 mL/min/[1.73_m2] Final     Comment:     Non  GFR Calc  Starting 12/18/2018, serum creatinine based estimated GFR (eGFR) will be   calculated using the Chronic Kidney Disease Epidemiology Collaboration   (CKD-EPI) equation.       GFR Estimate If Black   Date Value Ref Range Status   03/18/2021 84 >60 mL/min/[1.73_m2] Final     Comment:      GFR Calc  Starting 12/18/2018, serum creatinine based estimated GFR (eGFR) will be   calculated using the Chronic Kidney Disease Epidemiology Collaboration   (CKD-EPI) equation.       Lab Results   Component Value Date    CR 1.08 01/28/2022    CR 1.22 03/18/2021     No results found for: MICROALBUMIN    Healthy Eating:  Healthy Eating Assessed Today: Yes  Meals include: Breakfast, Lunch, Dinner  Breakfast: 7 am  Lunch: 12 pm  Dinner: 5-6 pm    Being Active:  Being Active Assessed Today: Yes  Exercise:: Yes  Days per week of moderate to strenuous exercise (like a brisk walk): 7 (skiing in the winter/back country skiing/hiking)    Monitoring:  Monitoring Assessed Today: Yes  Blood Glucose Meter: CGM    Glucose data:              Taking Medications:  Diabetes Medication(s)     Insulin       insulin aspart (NOVOLOG PEN) 100 UNIT/ML pen    Inject 3-12 units subcutaneous with meals and correction doses as directed     insulin degludec (TRESIBA FLEXTOUCH) 100 UNIT/ML pen    Inject 20-30 units daily as directed, insulin pens as backup for his insulin pump use     NOVOLOG VIAL 100 UNIT/ML soln    ADMINISTER  UP TO 75 UNITS VIA INSULIN PUMP DAILY          Taking Medication Assessed Today: Yes  Current Treatments: Insulin Pump    Problem Solving:  Problem Solving Assessed Today: Yes  Is the patient at risk for hypoglycemia?: Yes  Hypoglycemia Treatment: Glucose (tablets or gel)  Is the patient at risk for DKA?: Yes  Does patient have DKA prevention plan?: Yes  Does patient have severe weather/disaster plan for diabetes management?: Yes  Does patient have sick day plan for diabetes management?: Yes      Reducing Risks:  Reducing Risks Assessed Today: Yes  Has dilated eye exam at least once a year?: Yes  Sees dentist every 6 months?: Yes  Feet checked by healthcare provider in the last year?: Yes    Healthy Coping:  Healthy Coping Assessed Today: Yes  Patient Activation Measure Survey Score:  No flowsheet data found.      Tawny Garcia RD, LD, CDE  Time Spent: 30 minutes  Encounter Type: Individual        Any diabetes medication dose changes were made via the Certified Diabetes Care & Education Protocol in collaboration with the patient's referring provider. A copy of this encounter was shared with the provider.

## 2022-03-09 NOTE — PROGRESS NOTES
Diabetes Self-Management Education & Support    Presents for: Insulin Pump and CGM Review    Type of Visit: Video Visit    How would patient like to obtain AVS? Mae    ASSESSMENT:    Mateo presents for diabetes/pump review. Overall he thinks that things are going okay with his diabetes. He mentions that he is going on a 1 week diving trip 45 miles off of [facility]. He will be diving on and off throughout the week. He will not be able to wear his pump while he is in the water, but will put it back on when he gets out. He approximates that he will be off of his pump for about 4 hours/day, not consecutively. He will not be wearing his sensor so will be using his preset basal rates. Will plan to check BG with his fingerstick meter when not in water. We discussed needing to adjust his preset basal rates, as his auto-mode basal has been giving about 22 units/day and his preset basal rates would give him 31 units/day. Consulted with Dr. Schneider who recommended to decrease basal rates equally, by ~30% (31 units daily basal dose --> 22 units daily basal dose).   Basal rate change:  0000: 1.25 --> 0.88 unit(s)/hour  0700: 1.65 --> 1.17 unit(s)/hour  1200: 1.35 --> 0.96 unit(s)/hour  1500: 1.20 --> 0.85 unit(s)/hour    We discussed the extra supplies that he will need to bring on his trip. He states that he will check if he has glucagon, he does have ketone sticks, he will make sure that he has enough pump supplies and plans to bring an extra pump, will also bring Novolog pens an Tresiba pens.     He feels that his ISF might need to be decreased as he feels that his BG isn't coming down enough after correction. We also discussed a plan to slightly adjust his ICR at dinner .   Made the following adjustments to pump settings:  ISF: 33 --> 31  ICR: 5p: 5.2 g/u --> 5.0 g/u    Mateo mentions the few instances where his sensor glucose dropped overnight, down to 40's. He states that he checked with a fingerstick and his BG was normal,  up to 70 mg/dl above the sensor reading. Then the sensor wouldn't accept his calibration because it was significantly higher.  He thinks he maybe had a faulty sensor, plans to change it out.     Patient's most recent   Lab Results   Component Value Date    A1C 8.0 01/28/2022    A1C 8.2 03/18/2021    is not meeting goal of <7.0    Diabetes knowledge and skills assessment:   Patient is knowledgeable in diabetes management concepts related to: Healthy Eating, Being Active, Monitoring, Taking Medication, Problem Solving, Reducing Risks and Healthy Coping    Continue education with the following diabetes management concepts: Healthy Eating, Being Active, Monitoring, Taking Medication, Problem Solving, Reducing Risks and Healthy Coping    Based on learning assessment above, most appropriate setting for further diabetes education would be: Individual setting.    INTERVENTIONS:    Education provided today on:  AADE Self-Care Behaviors:  Healthy Eating: carbohydrate counting  Monitoring: log and interpret results and individual blood glucose targets  Taking Medication: proper site selection and rotation for injections and when to take medications  Problem Solving: high blood glucose - causes, signs/symptoms, treatment and prevention, low blood glucose - causes, signs/symptoms, treatment and prevention, carrying a carbohydrate source at all times and safe travel    Opportunities for ongoing education and support in diabetes-self management were discussed. Pt verbalized understanding of concepts discussed and recommendations provided today.       Education Materials Provided:  No new materials provided today      PLAN    Basal rate change:  0000: 1.25 --> 0.88 unit(s)/hour  0700: 1.65 --> 1.17 unit(s)/hour  1200: 1.35 --> 0.96 unit(s)/hour  1500: 1.20 --> 0.85 unit(s)/hour    Changes to pump settings:  ISF: 33 --> 31  ICR: 5p: 5.2  --> 5.0     Topics to cover at upcoming visits: Healthy Eating, Monitoring, Taking Medication  "and Problem Solving  Follow-up: with dr. Schneider in April    See Goals Section for co-developed, patient-stated behavior change goals.  AVS provided to patient today.    SUBJECTIVE / OBJECTIVE:  Presents for: Insulin Pump and CGM Review  Accompanied by: Self  Diabetes education in the past 24mo: No  Diabetes type: Type 1  Date of diagnosis: Age 10  Transportation concerns: No  Difficulty affording diabetes medication?: No  Difficulty affording diabetes testing supplies?: No  Cultural Influences/Ethnic Background:  Not  or     Diabetes Symptoms & Complications:     Complications assessed today?: No    Patient Problem List and Family Medical History reviewed for relevant medical history, current medical status, and diabetes risk factors.    Vitals:  There were no vitals taken for this visit.  Estimated body mass index is 25.84 kg/m  as calculated from the following:    Height as of 6/12/20: 1.753 m (5' 9\").    Weight as of 6/12/20: 79.4 kg (175 lb).   Last 3 BP:   BP Readings from Last 3 Encounters:   10/29/19 112/70   02/05/19 119/80   09/07/18 121/82       History   Smoking Status     Never Smoker   Smokeless Tobacco     Never Used       Labs:  Lab Results   Component Value Date    A1C 8.0 01/28/2022    A1C 8.2 03/18/2021     Lab Results   Component Value Date     01/28/2022     03/18/2021     Lab Results   Component Value Date    LDL 95 07/27/2021    LDL 91 06/15/2020     HDL Cholesterol   Date Value Ref Range Status   06/15/2020 70 >39 mg/dL Final     Direct Measure HDL   Date Value Ref Range Status   07/27/2021 83 >=40 mg/dL Final     Comment:     0-19 years:       Greater than or equal to 45 mg/dL   Low: Less than 40 mg/dL   Borderline low: 40-44 mg/dL     20 years and older:   Female: Greater than or equal to 50 mg/dL   Male:   Greater than or equal to 40 mg/dL        ]  GFR Estimate   Date Value Ref Range Status   01/28/2022 87 >60 mL/min/1.73m2 Final     Comment:     Effective " December 21, 2021 eGFRcr in adults is calculated using the 2021 CKD-EPI creatinine equation which includes age and gender (Dyana kapadia al., NEJM, DOI: 10.1056/LMLXho6472054)   03/18/2021 72 >60 mL/min/[1.73_m2] Final     Comment:     Non  GFR Calc  Starting 12/18/2018, serum creatinine based estimated GFR (eGFR) will be   calculated using the Chronic Kidney Disease Epidemiology Collaboration   (CKD-EPI) equation.       GFR Estimate If Black   Date Value Ref Range Status   03/18/2021 84 >60 mL/min/[1.73_m2] Final     Comment:      GFR Calc  Starting 12/18/2018, serum creatinine based estimated GFR (eGFR) will be   calculated using the Chronic Kidney Disease Epidemiology Collaboration   (CKD-EPI) equation.       Lab Results   Component Value Date    CR 1.08 01/28/2022    CR 1.22 03/18/2021     No results found for: MICROALBUMIN    Healthy Eating:  Healthy Eating Assessed Today: Yes  Meals include: Breakfast, Lunch, Dinner  Breakfast: 7 am  Lunch: 12 pm  Dinner: 5-6 pm    Being Active:  Being Active Assessed Today: Yes  Exercise:: Yes  Days per week of moderate to strenuous exercise (like a brisk walk): 7 (skiing in the winter/back country skiing/hiking)    Monitoring:  Monitoring Assessed Today: Yes  Blood Glucose Meter: CGM    Glucose data:              Taking Medications:  Diabetes Medication(s)     Insulin       insulin aspart (NOVOLOG PEN) 100 UNIT/ML pen    Inject 3-12 units subcutaneous with meals and correction doses as directed     insulin degludec (TRESIBA FLEXTOUCH) 100 UNIT/ML pen    Inject 20-30 units daily as directed, insulin pens as backup for his insulin pump use     NOVOLOG VIAL 100 UNIT/ML soln    ADMINISTER UP TO 75 UNITS VIA INSULIN PUMP DAILY          Taking Medication Assessed Today: Yes  Current Treatments: Insulin Pump    Problem Solving:  Problem Solving Assessed Today: Yes  Is the patient at risk for hypoglycemia?: Yes  Hypoglycemia Treatment: Glucose (tablets or  gel)  Is the patient at risk for DKA?: Yes  Does patient have DKA prevention plan?: Yes  Does patient have severe weather/disaster plan for diabetes management?: Yes  Does patient have sick day plan for diabetes management?: Yes      Reducing Risks:  Reducing Risks Assessed Today: Yes  Has dilated eye exam at least once a year?: Yes  Sees dentist every 6 months?: Yes  Feet checked by healthcare provider in the last year?: Yes    Healthy Coping:  Healthy Coping Assessed Today: Yes  Patient Activation Measure Survey Score:  No flowsheet data found.      Tawny Garcia RD, LD, CDE  Time Spent: 30 minutes  Encounter Type: Individual        Any diabetes medication dose changes were made via the Certified Diabetes Care & Education Protocol in collaboration with the patient's referring provider. A copy of this encounter was shared with the provider.

## 2022-04-19 ENCOUNTER — OFFICE VISIT (OUTPATIENT)
Dept: ENDOCRINOLOGY | Facility: CLINIC | Age: 42
End: 2022-04-19
Payer: COMMERCIAL

## 2022-04-19 VITALS
BODY MASS INDEX: 26.76 KG/M2 | SYSTOLIC BLOOD PRESSURE: 130 MMHG | DIASTOLIC BLOOD PRESSURE: 78 MMHG | WEIGHT: 181.2 LBS | HEART RATE: 73 BPM

## 2022-04-19 DIAGNOSIS — E10.3299 TYPE 1 DIABETES MELLITUS WITH BACKGROUND RETINOPATHY (H): Primary | ICD-10-CM

## 2022-04-19 DIAGNOSIS — Z96.41 INSULIN PUMP STATUS: ICD-10-CM

## 2022-04-19 PROCEDURE — 95251 CONT GLUC MNTR ANALYSIS I&R: CPT | Performed by: INTERNAL MEDICINE

## 2022-04-19 PROCEDURE — 99214 OFFICE O/P EST MOD 30 MIN: CPT | Performed by: INTERNAL MEDICINE

## 2022-04-19 RX ORDER — INSULIN ASPART 100 [IU]/ML
INJECTION, SOLUTION INTRAVENOUS; SUBCUTANEOUS
Qty: 30 ML | Refills: 11 | Status: SHIPPED | OUTPATIENT
Start: 2022-04-19 | End: 2023-05-08

## 2022-04-19 NOTE — PROGRESS NOTES
Recent issues:  Diabetes follow-up evaluation  Upgraded to Medtronic 770G pump Spring 2021  Recent dive trip to [facility] in 3/2022, went well  Feeling well recently, no other new health issues reported           1990. Diagnosis of diabetes, age 10, living in [place]  Began insulin treatment with insulin injections, recalls taking NPH and Regular insulins  Treatment with multiple daily injection (MDI) plan  Has seen me for diabetes evaluations at my former clinic ([facility])  2008. Switched to Medtronic insulin pump  Subsequent upgrades to other Medtronic pumps  Had used [employer] CGMS sensor  8/2017. Upgraded to Medtronic 670G pump with Guardian3 sensor    Spring 2021. Upgraded to Medtronic 770G pump              Novolog in pump     Uses [employer] Contour Link meter, tests 3-4x/day  Perceives good hypoglycemia awareness symptoms  Current Medtronic pump settings:       Recent pump Carelink report:        Recent [facility] labs include:  Lab Results   Component Value Date    A1C 8.0 (H) 01/28/2022     01/28/2022    POTASSIUM 4.8 01/28/2022    CHLORIDE 105 01/28/2022    CO2 28 01/28/2022    ANIONGAP 4 01/28/2022     (H) 01/28/2022    BUN 14 01/28/2022    CR 1.08 01/28/2022    GFRESTIMATED 87 01/28/2022    GFRESTBLACK 84 03/18/2021    WILBERTO 8.9 01/28/2022    CHOL 188 07/27/2021    TRIG 51 07/27/2021    HDL 83 07/27/2021    LDL 95 07/27/2021    NHDL 105 07/27/2021    UCRR 241 01/28/2022    MICROL 19 01/28/2022    UMALCR 7.88 01/28/2022     Lab Results   Component Value Date    TSH 2.20 03/18/2021     Goes to [facility], Dr. Armenta, BDR noted 5/2021  DM Complications:               Retinopathy                          BDR noted 3/2018        , 2 children, lives in [place].   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 Kevyn Mcintyre/[facility] [place]      PMH/PSH:  Past Medical History:   Diagnosis Date     Cervical radiculopathy     finger tingling     Insulin  pump status      Type 1 diabetes (H)      No past surgical history on file.    Family Hx:  Family History   Problem Relation Age of Onset     Other Cancer Father      Heart Failure Maternal Grandfather          Social Hx:  Social History     Socioeconomic History     Marital status:      Spouse name: Not on file     Number of children: Not on file     Years of education: Not on file     Highest education level: Not on file   Occupational History     Not on file   Tobacco Use     Smoking status: Never Smoker     Smokeless tobacco: Never Used   Substance and Sexual Activity     Alcohol use: Yes     Drug use: No     Sexual activity: Not on file   Other Topics Concern     Parent/sibling w/ CABG, MI or angioplasty before 65F 55M? Not Asked   Social History Narrative     Not on file     Social Determinants of Health     Financial Resource Strain: Not on file   Food Insecurity: Not on file   Transportation Needs: Not on file   Physical Activity: Not on file   Stress: Not on file   Social Connections: Not on file   Intimate Partner Violence: Not on file   Housing Stability: Not on file          MEDICATIONS:  has a current medication list which includes the following prescription(s): insulin pump, lisinopril, novolog vial, simvastatin, acetone urine, contour next test, blood glucose, baqsimi two pack, insulin aspart, insulin degludec, insulin pen needle, and insulin syringe-needle u-100.       ROS: 10 point ROS neg other than the symptoms noted above in the HPI.     GENERAL: energy good, ; denies fevers, chills, malaise, night sweats.   HEENT: no dysphagia, odonophagia, diplopia, neck pain or tenderness  THYROID:  no apparent hyper or hypothyroid symptoms  CV: no chest pain, pressure, palpitations, skipped beats  LUNGS: no SOB, VÁZQUEZ, cough, sputum production, wheezing   ABDOMEN: no diarrhea, constipation, abdominal pain  EXTREMITIES: no apparent rashes, ulcers, edema  NEUROLOGY: no headaches, denies changes in vision,  tingling, extremitiy numbness   MSK: occasional ankle (achilles?) pains; no other muscle aches or pains, weakness  SKIN: increased sweating; no rashes or lesions  PSYCH:  stable mood, no significant anxiety or depression  ENDOCRINE: no heat or cold intolerance      Physical Exam   VS: /78   Pulse 73   Wt 82.2 kg (181 lb 3.2 oz)   BMI 26.76 kg/m    GENERAL: AXOX3, NAD, well dressed, answering questions appropriately, appears stated age.  ENT: no nose swelling or nasal discharge, mouth redness or gum changes.  EYES: eyes grossly normal to inspection, conjunctivae and sclerae normal, no exophthalmos or proptosis  THYROID:  no apparent nodules or goiter  LUNGS: no audible wheeze, cough or visible cyanosis, or increased work of breathing  ABDOMEN: abdomen normal size  EXTREMITIES: normal appearance of feet, pulses R/L DP 4/4; no edema noted  NEUROLOGY: CN grossly intact, no tremors  MSK: grossly intact  SKIN:  no apparent skin lesions, rash, or edema with visualized skin appearance  PSYCH: mentation appears normal, affect normal/bright, judgement and insight intact,   normal speech and appearance well groomed    LABS:     All pertinent notes, labs, and images personally reviewed by me.        A/P:  Encounter Diagnoses   Name Primary?     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  Reviewed and interpreted tests that I previously ordered.   Ordered appropriate tests for the endocrinology disease management.    Management options discussed and implemented after shared medical decision making with the patient.  T1DM problem is chronic-stable    Plan:  Reviewed the overall T1DM management and insulin pump use.  Discussed optimal BG testing to assess glucose trends.  We reviewed insulin pump settings, basal rate and bolus dosing  Use  of automated pump bolus dosing for meal/snack carb & correction dosing  Discussed recent Guardian3 glucose sensor trend data, in detail     Recommend:  Continue current Medtronic pump and CGM sensor, diabetes management plan  Pump setting changes:   Bolus ICR 11a 5.9 to 5.7    Continue use of the insulin pump auto-mode regularly  Use the Temp Target setting for aerobic exercise  Plan fasting lab testing soon   Discussed the nearby  PL clinic   Lab orders placed  Plan to take extra diabetes supplies (Nv vial, Tresiba pen, syringes, pen needles) on vacations  Discussed idea of using a Freestyle Libre2 CGM sensor for diving or when his Guardian3 sensor not useful  Continue lisinopril and simvastatin daily dose plan  Arrange annual dilated eye exam, fasting lipid panel testing.  Needs follow-up endocrinology appointments every -4 months     Keep f/u evaluation with PCP or neurologist to review cervical radiculopathy management  Addressed patient's questions today.    There are no Patient Instructions on file for this visit.    Future labs ordered today:   Orders Placed This Encounter   Procedures     GLUCOSE MONITOR, 72 HOUR, PHYS INTERP     TSH     Lipid panel reflex to direct LDL Fasting     Basic metabolic panel     Hemoglobin A1c     Radiology/Consults ordered today: None    Total time spent in with the patient evaluation:  21 min  Additional time spent reviewing pertinent lab tests and chart notes, and documentation:  5 min    Follow-up:  9/2022, Return    ELIZABETH Schneider MD, MS  Endocrinology  [facility]

## 2022-04-19 NOTE — LETTER
4/19/2022         RE: Mateo Rodriguez  [address]  [facility] [id number]        Dear Colleague,    Thank you for referring your patient, Mateo Rodriguez, to the [facility] [place]. Please see a copy of my visit note below.      Recent issues:  Diabetes follow-up evaluation  Upgraded to Medtronic 770G pump Spring 2021  Recent dive trip to [facility] in 3/2022, went well  Feeling well recently, no other new health issues reported           1990. Diagnosis of diabetes, age 10, living in [place]  Began insulin treatment with insulin injections, recalls taking NPH and Regular insulins  Treatment with multiple daily injection (MDI) plan  Has seen me for diabetes evaluations at my former clinic ([facility])  2008. Switched to Medtronic insulin pump  Subsequent upgrades to other Medtronic pumps  Had used [employer] CGMS sensor  8/2017. Upgraded to Medtronic 670G pump with Guardian3 sensor    Spring 2021. Upgraded to Medtronic 770G pump              Novolog in pump     Uses [employer] Contour Link meter, tests 3-4x/day  Perceives good hypoglycemia awareness symptoms  Current Medtronic pump settings:       Recent pump Carelink report:        Recent [facility] labs include:  Lab Results   Component Value Date    A1C 8.0 (H) 01/28/2022     01/28/2022    POTASSIUM 4.8 01/28/2022    CHLORIDE 105 01/28/2022    CO2 28 01/28/2022    ANIONGAP 4 01/28/2022     (H) 01/28/2022    BUN 14 01/28/2022    CR 1.08 01/28/2022    GFRESTIMATED 87 01/28/2022    GFRESTBLACK 84 03/18/2021    WILBERTO 8.9 01/28/2022    CHOL 188 07/27/2021    TRIG 51 07/27/2021    HDL 83 07/27/2021    LDL 95 07/27/2021    NHDL 105 07/27/2021    UCRR 241 01/28/2022    MICROL 19 01/28/2022    UMALCR 7.88 01/28/2022     Lab Results   Component Value Date    TSH 2.20 03/18/2021     Goes to [facility], Dr. Armenta, BDR noted 5/2021  DM Complications:               Retinopathy                          BDR noted 3/2018        , 2  children, lives in [place].   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 Kevyn Mcintyre/[facility] [place]      PMH/PSH:  Past Medical History:   Diagnosis Date     Cervical radiculopathy     finger tingling     Insulin pump status      Type 1 diabetes (H)      No past surgical history on file.    Family Hx:  Family History   Problem Relation Age of Onset     Other Cancer Father      Heart Failure Maternal Grandfather          Social Hx:  Social History     Socioeconomic History     Marital status:      Spouse name: Not on file     Number of children: Not on file     Years of education: Not on file     Highest education level: Not on file   Occupational History     Not on file   Tobacco Use     Smoking status: Never Smoker     Smokeless tobacco: Never Used   Substance and Sexual Activity     Alcohol use: Yes     Drug use: No     Sexual activity: Not on file   Other Topics Concern     Parent/sibling w/ CABG, MI or angioplasty before 65F 55M? Not Asked   Social History Narrative     Not on file     Social Determinants of Health     Financial Resource Strain: Not on file   Food Insecurity: Not on file   Transportation Needs: Not on file   Physical Activity: Not on file   Stress: Not on file   Social Connections: Not on file   Intimate Partner Violence: Not on file   Housing Stability: Not on file          MEDICATIONS:  has a current medication list which includes the following prescription(s): insulin pump, lisinopril, novolog vial, simvastatin, acetone urine, contour next test, blood glucose, baqsimi two pack, insulin aspart, insulin degludec, insulin pen needle, and insulin syringe-needle u-100.       ROS: 10 point ROS neg other than the symptoms noted above in the HPI.     GENERAL: energy good, ; denies fevers, chills, malaise, night sweats.   HEENT: no dysphagia, odonophagia, diplopia, neck pain or tenderness  THYROID:  no apparent hyper or hypothyroid  symptoms  CV: no chest pain, pressure, palpitations, skipped beats  LUNGS: no SOB, VÁZQUEZ, cough, sputum production, wheezing   ABDOMEN: no diarrhea, constipation, abdominal pain  EXTREMITIES: no apparent rashes, ulcers, edema  NEUROLOGY: no headaches, denies changes in vision, tingling, extremitiy numbness   MSK: occasional ankle (achilles?) pains; no other muscle aches or pains, weakness  SKIN: increased sweating; no rashes or lesions  PSYCH:  stable mood, no significant anxiety or depression  ENDOCRINE: no heat or cold intolerance      Physical Exam   VS: /78   Pulse 73   Wt 82.2 kg (181 lb 3.2 oz)   BMI 26.76 kg/m    GENERAL: AXOX3, NAD, well dressed, answering questions appropriately, appears stated age.  ENT: no nose swelling or nasal discharge, mouth redness or gum changes.  EYES: eyes grossly normal to inspection, conjunctivae and sclerae normal, no exophthalmos or proptosis  THYROID:  no apparent nodules or goiter  LUNGS: no audible wheeze, cough or visible cyanosis, or increased work of breathing  ABDOMEN: abdomen normal size  EXTREMITIES: normal appearance of feet, pulses R/L DP 4/4; no edema noted  NEUROLOGY: CN grossly intact, no tremors  MSK: grossly intact  SKIN:  no apparent skin lesions, rash, or edema with visualized skin appearance  PSYCH: mentation appears normal, affect normal/bright, judgement and insight intact,   normal speech and appearance well groomed    LABS:     All pertinent notes, labs, and images personally reviewed by me.        A/P:  Encounter Diagnoses   Name Primary?     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  Reviewed and interpreted tests that I previously ordered.   Ordered appropriate tests for the endocrinology disease management.    Management options discussed and implemented after  shared medical decision making with the patient.  T1DM problem is chronic-stable    Plan:  Reviewed the overall T1DM management and insulin pump use.  Discussed optimal BG testing to assess glucose trends.  We reviewed insulin pump settings, basal rate and bolus dosing  Use of automated pump bolus dosing for meal/snack carb & correction dosing  Discussed recent Guardian3 glucose sensor trend data, in detail     Recommend:  Continue current Medtronic pump and CGM sensor, diabetes management plan  Pump setting changes:   Bolus ICR 11a 5.9 to 5.7    Continue use of the insulin pump auto-mode regularly  Use the Temp Target setting for aerobic exercise  Plan fasting lab testing soon   Discussed the nearby  PL clinic   Lab orders placed  Plan to take extra diabetes supplies (Nv vial, Tresiba pen, syringes, pen needles) on vacations  Discussed idea of using a Freestyle Libre2 CGM sensor for diving or when his Guardian3 sensor not useful  Continue lisinopril and simvastatin daily dose plan  Arrange annual dilated eye exam, fasting lipid panel testing.  Needs follow-up endocrinology appointments every -4 months     Keep f/u evaluation with PCP or neurologist to review cervical radiculopathy management  Addressed patient's questions today.    There are no Patient Instructions on file for this visit.    Future labs ordered today:   Orders Placed This Encounter   Procedures     GLUCOSE MONITOR, 72 HOUR, PHYS INTERP     TSH     Lipid panel reflex to direct LDL Fasting     Basic metabolic panel     Hemoglobin A1c     Radiology/Consults ordered today: None    Total time spent in with the patient evaluation:  21 min  Additional time spent reviewing pertinent lab tests and chart notes, and documentation:  5 min    Follow-up:  9/2022, Return    ELIZABETH Schneider MD, MS  Endocrinology  [facility]                      Again, thank you for allowing me to participate in the care of your patient.        Sincerely,        Onur AC  MD [place]

## 2022-04-26 ENCOUNTER — LAB (OUTPATIENT)
Dept: LAB | Facility: CLINIC | Age: 42
End: 2022-04-26
Payer: COMMERCIAL

## 2022-04-26 DIAGNOSIS — Z96.41 INSULIN PUMP STATUS: ICD-10-CM

## 2022-04-26 DIAGNOSIS — E10.3299 TYPE 1 DIABETES MELLITUS WITH BACKGROUND RETINOPATHY (H): ICD-10-CM

## 2022-04-26 LAB — HBA1C MFR BLD: 7.7 % (ref 0–5.6)

## 2022-04-26 PROCEDURE — 80048 BASIC METABOLIC PNL TOTAL CA: CPT

## 2022-04-26 PROCEDURE — 83036 HEMOGLOBIN GLYCOSYLATED A1C: CPT

## 2022-04-26 PROCEDURE — 84443 ASSAY THYROID STIM HORMONE: CPT

## 2022-04-26 PROCEDURE — 36415 COLL VENOUS BLD VENIPUNCTURE: CPT

## 2022-04-26 PROCEDURE — 80061 LIPID PANEL: CPT

## 2022-04-27 LAB
ANION GAP SERPL CALCULATED.3IONS-SCNC: 4 MMOL/L (ref 3–14)
BUN SERPL-MCNC: 19 MG/DL (ref 7–30)
CALCIUM SERPL-MCNC: 8.6 MG/DL (ref 8.5–10.1)
CHLORIDE BLD-SCNC: 105 MMOL/L (ref 94–109)
CHOLEST SERPL-MCNC: 177 MG/DL
CO2 SERPL-SCNC: 28 MMOL/L (ref 20–32)
CREAT SERPL-MCNC: 1.14 MG/DL (ref 0.66–1.25)
FASTING STATUS PATIENT QL REPORTED: YES
GFR SERPL CREATININE-BSD FRML MDRD: 82 ML/MIN/1.73M2
GLUCOSE BLD-MCNC: 156 MG/DL (ref 70–99)
HDLC SERPL-MCNC: 60 MG/DL
LDLC SERPL CALC-MCNC: 104 MG/DL
NONHDLC SERPL-MCNC: 117 MG/DL
POTASSIUM BLD-SCNC: 4.6 MMOL/L (ref 3.4–5.3)
SODIUM SERPL-SCNC: 137 MMOL/L (ref 133–144)
TRIGL SERPL-MCNC: 63 MG/DL
TSH SERPL DL<=0.005 MIU/L-ACNC: 2.27 MU/L (ref 0.4–4)

## 2022-06-22 ENCOUNTER — MEDICAL CORRESPONDENCE (OUTPATIENT)
Dept: HEALTH INFORMATION MANAGEMENT | Facility: CLINIC | Age: 42
End: 2022-06-22

## 2022-08-25 DIAGNOSIS — Z96.41 INSULIN PUMP STATUS: ICD-10-CM

## 2022-08-25 DIAGNOSIS — E10.3299 TYPE 1 DIABETES MELLITUS WITH BACKGROUND RETINOPATHY (H): ICD-10-CM

## 2022-08-25 NOTE — TELEPHONE ENCOUNTER
Last Written Prescription Date:  4/22/21  Last Fill Quantity: 400,  # refills: 3   Last office visit: 4/19/2022 with prescribing provider: Dr. Schneider  Future Office Visit:  9/14/22    Refill approved per protocol  Aaliyah López RN

## 2022-09-14 ENCOUNTER — OFFICE VISIT (OUTPATIENT)
Dept: ENDOCRINOLOGY | Facility: CLINIC | Age: 42
End: 2022-09-14
Payer: COMMERCIAL

## 2022-09-14 VITALS
WEIGHT: 187.6 LBS | SYSTOLIC BLOOD PRESSURE: 120 MMHG | BODY MASS INDEX: 27.7 KG/M2 | HEART RATE: 70 BPM | DIASTOLIC BLOOD PRESSURE: 82 MMHG

## 2022-09-14 DIAGNOSIS — I10 ESSENTIAL HYPERTENSION: ICD-10-CM

## 2022-09-14 DIAGNOSIS — E78.5 HYPERLIPIDEMIA LDL GOAL <100: ICD-10-CM

## 2022-09-14 DIAGNOSIS — E10.3299 TYPE 1 DIABETES MELLITUS WITH BACKGROUND RETINOPATHY (H): Primary | ICD-10-CM

## 2022-09-14 DIAGNOSIS — Z96.41 INSULIN PUMP STATUS: ICD-10-CM

## 2022-09-14 PROCEDURE — 99214 OFFICE O/P EST MOD 30 MIN: CPT | Performed by: INTERNAL MEDICINE

## 2022-09-14 PROCEDURE — 95251 CONT GLUC MNTR ANALYSIS I&R: CPT | Performed by: INTERNAL MEDICINE

## 2022-09-14 RX ORDER — LISINOPRIL 10 MG/1
10 TABLET ORAL DAILY
Qty: 90 TABLET | Refills: 3 | Status: SHIPPED | OUTPATIENT
Start: 2022-09-14 | End: 2023-12-08

## 2022-09-14 RX ORDER — SIMVASTATIN 40 MG
40 TABLET ORAL EVERY EVENING
Qty: 90 TABLET | Refills: 3 | Status: SHIPPED | OUTPATIENT
Start: 2022-09-14 | End: 2023-09-06 | Stop reason: ALTCHOICE

## 2022-09-14 NOTE — LETTER
9/14/2022         RE: Mateo Rodriguez  [address]  [place] [address]        Dear Colleague,    Thank you for referring your patient, Mateo Rodriguez, to the [facility] [place]. Please see a copy of my visit note below.      Recent issues:  Diabetes follow-up evaluation  Continues to use the Medtronic 770G pump with Guardian 3 sensor  Feeling well recently, no other new health issues reported           1990. Diagnosis of diabetes, age 10, living in [place]  Began insulin treatment with insulin injections, recalls taking NPH and Regular insulins  Treatment with multiple daily injection (MDI) plan  Has seen me for diabetes evaluations at my former clinic ([facility])  2008. Switched to Medtronic insulin pump  Subsequent upgrades to other Medtronic pumps  Had used [employer] CGMS sensor  8/2017. Upgraded to Medtronic 670G pump with Guardian3 sensor    Spring 2021. Upgraded to Medtronic 770G pump              Novolog in pump     Uses "[employer] Contour Link meter, tests 3-4x/day  Perceives good hypoglycemia awareness symptoms  Current Medtronic pump settings:       Recent pump Carelink report:                Recent [facility] labs include:  Lab Results   Component Value Date    A1C 7.7 (H) 04/26/2022     04/26/2022    POTASSIUM 4.6 04/26/2022    CHLORIDE 105 04/26/2022    CO2 28 04/26/2022    ANIONGAP 4 04/26/2022     (H) 04/26/2022    BUN 19 04/26/2022    CR 1.14 04/26/2022    GFRESTIMATED 82 04/26/2022    GFRESTBLACK 84 03/18/2021    WILBERTO 8.6 04/26/2022    CHOL 177 04/26/2022    TRIG 63 04/26/2022    HDL 60 04/26/2022     (H) 04/26/2022    NHDL 117 04/26/2022    UCRR 241 01/28/2022    MICROL 19 01/28/2022    UMALCR 7.88 01/28/2022     Lab Results   Component Value Date    TSH 2.27 04/26/2022     Goes to [facility], Dr. Armenta, BDR noted 5/2021  DM Complications:               Retinopathy                          BDR noted 3/2018        , 2 children, lives in [place].   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 Kevyn Mcintyre/[facility]      PMH/PSH:  Past Medical History:   Diagnosis Date     Cervical radiculopathy     finger tingling     Insulin pump status      Type 1 diabetes (H)      No past surgical history on file.    Family Hx:  Family History   Problem Relation Age of Onset     Other Cancer Father      Heart Failure Maternal Grandfather          Social Hx:  Social History     Socioeconomic History     Marital status:      Spouse name: Not on file     Number of children: Not on file     Years of education: Not on file     Highest education level: Not on file   Occupational History     Not on file   Tobacco Use     Smoking status: Never Smoker     Smokeless tobacco: Never Used   Substance and Sexual Activity     Alcohol use: Yes     Drug use: No     Sexual activity: Not on file   Other Topics Concern     Parent/sibling w/ CABG, MI or angioplasty before 65F 55M? Not Asked   Social History Narrative     Not on file     Social Determinants of Health     Financial Resource Strain: Not on file   Food Insecurity: Not on file   Transportation Needs: Not on file   Physical Activity: Not on file   Stress: Not on file   Social Connections: Not on file   Intimate Partner Violence: Not on file   Housing Stability: Not on file          MEDICATIONS:  has a current medication list which includes the following prescription(s): insulin aspart, insulin pump, lisinopril, simvastatin, acetone urine, contour next test, blood glucose, baqsimi two pack, insulin aspart, insulin degludec, insulin pen needle, and insulin syringe-needle u-100.       ROS: 10 point ROS neg other than the symptoms noted above in the HPI.     GENERAL: energy good, ; denies fevers, chills, malaise, night sweats.   HEENT: no dysphagia, odonophagia, diplopia, neck pain or tenderness  THYROID:  no apparent hyper or hypothyroid symptoms  CV: no chest pain,  pressure, palpitations, skipped beats  LUNGS: no SOB, VÁZQUEZ, cough, sputum production, wheezing   ABDOMEN: no diarrhea, constipation, abdominal pain  EXTREMITIES: no apparent rashes, ulcers, edema  NEUROLOGY: no headaches, denies changes in vision, tingling, extremitiy numbness   MSK: occasional ankle (achilles?) pains; no other muscle aches or pains, weakness  SKIN: increased sweating; no rashes or lesions  PSYCH:  stable mood, no significant anxiety or depression  ENDOCRINE: no heat or cold intolerance      Physical Exam   VS: /82   Pulse 70   Wt 85.1 kg (187 lb 9.6 oz)   BMI 27.70 kg/m    GENERAL: AXOX3, NAD, well dressed, answering questions appropriately, appears stated age.  ENT: no nose swelling or nasal discharge, mouth redness or gum changes.  EYES: eyes grossly normal to inspection, conjunctivae and sclerae normal, no exophthalmos or proptosis  THYROID:  no apparent nodules or goiter  LUNGS: no audible wheeze, cough or visible cyanosis, or increased work of breathing  ABDOMEN: abdomen normal size  EXTREMITIES: feet not examined today  NEUROLOGY: CN grossly intact, no tremors  MSK: grossly intact  SKIN:  no apparent skin lesions, rash, or edema with visualized skin appearance  PSYCH: mentation appears normal, affect normal/bright, judgement and insight intact,   normal speech and appearance well groomed    LABS:     All pertinent notes, labs, and images personally reviewed by me.        A/P:  Encounter Diagnoses   Name Primary?     Type 1 diabetes mellitus with background retinopathy (H) Yes     Insulin pump status      Hyperlipidemia LDL goal <100      Essential hypertension        Comments:  Reviewed health history and diabetes issues  Improved glucose control with use of the Medtronic 770G pump and sensor system.  Reviewed and interpreted tests that I previously ordered.   Ordered appropriate tests for the endocrinology disease management.    Management options discussed and implemented after  shared medical decision making with the patient.  T1DM problem is chronic-stable    Plan:  Reviewed the overall T1DM management and insulin pump use.  Discussed optimal BG testing to assess glucose trends.  We reviewed insulin pump settings, basal rate and bolus dosing  Use of automated pump bolus dosing for meal/snack carb & correction dosing  Discussed recent Guardian3 glucose sensor trend data, in detail     Recommend:  Continue current Medtronic pump and CGM sensor, diabetes management plan  No pump setting changes at this time    Continue use of the insulin pump auto-mode regularly  Use the Temp Target setting for aerobic exercise  Plan non-fasting lab testing soon   Discussed the nearby  PL clinic   Lab orders placed  Plan to take extra diabetes supplies (Nv vial, Tresiba pen, syringes, pen needles) on vacations  We have previously discussed idea of using a Freestyle Libre2 CGM sensor for diving or when his Guardian3 sensor not useful  Continue lisinopril and simvastatin daily dose plan  Arrange annual dilated eye exam, fasting lipid panel testing.  Needs follow-up endocrinology appointments every -4 months     Keep f/u evaluation with PCP or neurologist to review cervical radiculopathy management  Addressed patient's questions today.    There are no Patient Instructions on file for this visit.    Future labs ordered today:   Orders Placed This Encounter   Procedures     GLUCOSE MONITOR, 72 HOUR, PHYS INTERP     Hemoglobin A1c     Basic metabolic panel     ALT     Radiology/Consults ordered today: None    Total time spent in with the patient evaluation:  17 min  Additional time spent reviewing pertinent lab tests and chart notes, and documentation:  5 min    Follow-up:  1/2023 or 2/2023, GENNY Schneider MD, MS  Endocrinology  [facility]                        Again, thank you for allowing me to participate in the care of your patient.        Sincerely,        Onur Schneider MD

## 2022-09-14 NOTE — PROGRESS NOTES
Recent issues:  Diabetes follow-up evaluation  Continues to use the Medtronic 770G pump with Guardian 3 sensor  Feeling well recently, no other new health issues reported           1990. Diagnosis of diabetes, age 10, living in [place]  Began insulin treatment with insulin injections, recalls taking NPH and Regular insulins  Treatment with multiple daily injection (MDI) plan  Has seen me for diabetes evaluations at my former clinic ([facility])  2008. Switched to Medtronic insulin pump  Subsequent upgrades to other Medtronic pumps  Had used Enlite CGMS sensor  8/2017. Upgraded to Medtronic 670G pump with Guardian3 sensor    Spring 2021. Upgraded to Medtronic 770G pump              Novolog in pump     Uses [employer]apse Contour Link meter, tests 3-4x/day  Perceives good hypoglycemia awareness symptoms  Current Medtronic pump settings:       Recent pump Carelink report:                Recent [facility] labs include:  Lab Results   Component Value Date    A1C 7.7 (H) 04/26/2022     04/26/2022    POTASSIUM 4.6 04/26/2022    CHLORIDE 105 04/26/2022    CO2 28 04/26/2022    ANIONGAP 4 04/26/2022     (H) 04/26/2022    BUN 19 04/26/2022    CR 1.14 04/26/2022    GFRESTIMATED 82 04/26/2022    GFRESTBLACK 84 03/18/2021    WILBERTO 8.6 04/26/2022    CHOL 177 04/26/2022    TRIG 63 04/26/2022    HDL 60 04/26/2022     (H) 04/26/2022    NHDL 117 04/26/2022    UCRR 241 01/28/2022    MICROL 19 01/28/2022    UMALCR 7.88 01/28/2022     Lab Results   Component Value Date    TSH 2.27 04/26/2022     Goes to [facility], Dr. Armenta, BDR noted 5/2021  DM Complications:               Retinopathy                          BDR noted 3/2018        , 2 children, lives in [place].   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 Kevyn Mcintyre/ASHLEY [place]      PMH/PSH:  Past Medical History:   Diagnosis Date     Cervical radiculopathy     finger tingling     Insulin pump status      Type  1 diabetes (H)      No past surgical history on file.    Family Hx:  Family History   Problem Relation Age of Onset     Other Cancer Father      Heart Failure Maternal Grandfather          Social Hx:  Social History     Socioeconomic History     Marital status:      Spouse name: Not on file     Number of children: Not on file     Years of education: Not on file     Highest education level: Not on file   Occupational History     Not on file   Tobacco Use     Smoking status: Never Smoker     Smokeless tobacco: Never Used   Substance and Sexual Activity     Alcohol use: Yes     Drug use: No     Sexual activity: Not on file   Other Topics Concern     Parent/sibling w/ CABG, MI or angioplasty before 65F 55M? Not Asked   Social History Narrative     Not on file     Social Determinants of Health     Financial Resource Strain: Not on file   Food Insecurity: Not on file   Transportation Needs: Not on file   Physical Activity: Not on file   Stress: Not on file   Social Connections: Not on file   Intimate Partner Violence: Not on file   Housing Stability: Not on file          MEDICATIONS:  has a current medication list which includes the following prescription(s): insulin aspart, insulin pump, lisinopril, simvastatin, acetone urine, contour next test, blood glucose, baqsimi two pack, insulin aspart, insulin degludec, insulin pen needle, and insulin syringe-needle u-100.       ROS: 10 point ROS neg other than the symptoms noted above in the HPI.     GENERAL: energy good, ; denies fevers, chills, malaise, night sweats.   HEENT: no dysphagia, odonophagia, diplopia, neck pain or tenderness  THYROID:  no apparent hyper or hypothyroid symptoms  CV: no chest pain, pressure, palpitations, skipped beats  LUNGS: no SOB, VÁZQUEZ, cough, sputum production, wheezing   ABDOMEN: no diarrhea, constipation, abdominal pain  EXTREMITIES: no apparent rashes, ulcers, edema  NEUROLOGY: no headaches, denies changes in vision, tingling, extremitiy  numbness   MSK: occasional ankle (achilles?) pains; no other muscle aches or pains, weakness  SKIN: increased sweating; no rashes or lesions  PSYCH:  stable mood, no significant anxiety or depression  ENDOCRINE: no heat or cold intolerance      Physical Exam   VS: /82   Pulse 70   Wt 85.1 kg (187 lb 9.6 oz)   BMI 27.70 kg/m    GENERAL: AXOX3, NAD, well dressed, answering questions appropriately, appears stated age.  ENT: no nose swelling or nasal discharge, mouth redness or gum changes.  EYES: eyes grossly normal to inspection, conjunctivae and sclerae normal, no exophthalmos or proptosis  THYROID:  no apparent nodules or goiter  LUNGS: no audible wheeze, cough or visible cyanosis, or increased work of breathing  ABDOMEN: abdomen normal size  EXTREMITIES: feet not examined today  NEUROLOGY: CN grossly intact, no tremors  MSK: grossly intact  SKIN:  no apparent skin lesions, rash, or edema with visualized skin appearance  PSYCH: mentation appears normal, affect normal/bright, judgement and insight intact,   normal speech and appearance well groomed    LABS:     All pertinent notes, labs, and images personally reviewed by me.        A/P:  Encounter Diagnoses   Name Primary?     Type 1 diabetes mellitus with background retinopathy (H) Yes     Insulin pump status      Hyperlipidemia LDL goal <100      Essential hypertension        Comments:  Reviewed health history and diabetes issues  Improved glucose control with use of the Medtronic 770G pump and sensor system.  Reviewed and interpreted tests that I previously ordered.   Ordered appropriate tests for the endocrinology disease management.    Management options discussed and implemented after shared medical decision making with the patient.  T1DM problem is chronic-stable    Plan:  Reviewed the overall T1DM management and insulin pump use.  Discussed optimal BG testing to assess glucose trends.  We reviewed insulin pump settings, basal rate and bolus  dosing  Use of automated pump bolus dosing for meal/snack carb & correction dosing  Discussed recent Guardian3 glucose sensor trend data, in detail     Recommend:  Continue current Medtronic pump and CGM sensor, diabetes management plan  No pump setting changes at this time    Continue use of the insulin pump auto-mode regularly  Use the Temp Target setting for aerobic exercise  Plan non-fasting lab testing soon   Discussed the nearby  PL clinic   Lab orders placed  Plan to take extra diabetes supplies (Nv vial, Tresiba pen, syringes, pen needles) on vacations  We have previously discussed idea of using a Freestyle Libre2 CGM sensor for diving or when his Guardian3 sensor not useful  Continue lisinopril and simvastatin daily dose plan  Arrange annual dilated eye exam, fasting lipid panel testing.  Needs follow-up endocrinology appointments every -4 months     Keep f/u evaluation with PCP or neurologist to review cervical radiculopathy management  Addressed patient's questions today.    There are no Patient Instructions on file for this visit.    Future labs ordered today:   Orders Placed This Encounter   Procedures     GLUCOSE MONITOR, 72 HOUR, PHYS INTERP     Hemoglobin A1c     Basic metabolic panel     ALT     Radiology/Consults ordered today: None    Total time spent in with the patient evaluation:  17 min  Additional time spent reviewing pertinent lab tests and chart notes, and documentation:  5 min    Follow-up:  1/2023 or 2/2023, GENNY Schneider MD, MS  Endocrinology  [facility]

## 2022-09-15 ENCOUNTER — TELEPHONE (OUTPATIENT)
Dept: ENDOCRINOLOGY | Facility: CLINIC | Age: 42
End: 2022-09-15

## 2022-10-11 ENCOUNTER — LAB (OUTPATIENT)
Dept: LAB | Facility: CLINIC | Age: 42
End: 2022-10-11
Payer: COMMERCIAL

## 2022-10-11 DIAGNOSIS — Z96.41 INSULIN PUMP STATUS: ICD-10-CM

## 2022-10-11 DIAGNOSIS — E10.3299 TYPE 1 DIABETES MELLITUS WITH BACKGROUND RETINOPATHY (H): ICD-10-CM

## 2022-10-11 LAB
ALT SERPL W P-5'-P-CCNC: 32 U/L (ref 0–70)
ANION GAP SERPL CALCULATED.3IONS-SCNC: 5 MMOL/L (ref 3–14)
BUN SERPL-MCNC: 13 MG/DL (ref 7–30)
CALCIUM SERPL-MCNC: 9.3 MG/DL (ref 8.5–10.1)
CHLORIDE BLD-SCNC: 102 MMOL/L (ref 94–109)
CO2 SERPL-SCNC: 28 MMOL/L (ref 20–32)
CREAT SERPL-MCNC: 1.08 MG/DL (ref 0.66–1.25)
GFR SERPL CREATININE-BSD FRML MDRD: 87 ML/MIN/1.73M2
GLUCOSE BLD-MCNC: 188 MG/DL (ref 70–99)
HBA1C MFR BLD: 7.6 % (ref 0–5.6)
POTASSIUM BLD-SCNC: 4.9 MMOL/L (ref 3.4–5.3)
SODIUM SERPL-SCNC: 135 MMOL/L (ref 133–144)

## 2022-10-11 PROCEDURE — 84460 ALANINE AMINO (ALT) (SGPT): CPT

## 2022-10-11 PROCEDURE — 80048 BASIC METABOLIC PNL TOTAL CA: CPT

## 2022-10-11 PROCEDURE — 83036 HEMOGLOBIN GLYCOSYLATED A1C: CPT

## 2022-10-11 PROCEDURE — 36415 COLL VENOUS BLD VENIPUNCTURE: CPT

## 2022-11-05 ENCOUNTER — MYC MEDICAL ADVICE (OUTPATIENT)
Dept: ENDOCRINOLOGY | Facility: CLINIC | Age: 42
End: 2022-11-05

## 2023-02-09 ENCOUNTER — OFFICE VISIT (OUTPATIENT)
Dept: ENDOCRINOLOGY | Facility: CLINIC | Age: 43
End: 2023-02-09
Payer: COMMERCIAL

## 2023-02-09 VITALS
WEIGHT: 191.3 LBS | SYSTOLIC BLOOD PRESSURE: 107 MMHG | HEART RATE: 81 BPM | DIASTOLIC BLOOD PRESSURE: 71 MMHG | BODY MASS INDEX: 28.25 KG/M2

## 2023-02-09 DIAGNOSIS — Z96.41 INSULIN PUMP STATUS: ICD-10-CM

## 2023-02-09 DIAGNOSIS — E10.3299 TYPE 1 DIABETES MELLITUS WITH BACKGROUND RETINOPATHY (H): Primary | ICD-10-CM

## 2023-02-09 PROCEDURE — 99214 OFFICE O/P EST MOD 30 MIN: CPT | Performed by: INTERNAL MEDICINE

## 2023-02-09 PROCEDURE — 95251 CONT GLUC MNTR ANALYSIS I&R: CPT | Performed by: INTERNAL MEDICINE

## 2023-02-09 RX ORDER — INSULIN DEGLUDEC 100 U/ML
INJECTION, SOLUTION SUBCUTANEOUS
Qty: 15 ML | Refills: 0 | Status: SHIPPED | OUTPATIENT
Start: 2023-02-09

## 2023-02-09 NOTE — LETTER
2/9/2023         RE: Mateo Rodriguez  [address]  [facility] [address]        Dear Colleague,    Thank you for referring your patient, Mateo Rodriguez, to the [facility]. Please see a copy of my visit note below.      Recent issues:  Diabetes follow-up evaluation  Continues to use the Medtronic 770G pump with Guardian 3 sensor  Recent left knee injury while skiing in [place], tear of MCL and some ACL ligaments, now wearing left knee bace  Plans pee skiing from boat [employer] 3/4/23           1990. Diagnosis of diabetes, age 10, living in [place]  Began insulin treatment with insulin injections, recalls taking NPH and Regular insulins  Treatment with multiple daily injection (MDI) plan  Has seen me for diabetes evaluations at my former clinic ([facility])  2008. Switched to Medtronic insulin pump  Subsequent upgrades to other Medtronic pumps  Had used Enlite CGMS sensor  8/2017. Upgraded to Medtronic 670G pump with Guardian3 sensor    Spring 2021. Upgraded to Medtronic 770G pump              Novolog in pump     Uses [employer] Contour Link meter, tests 3-4x/day  Perceives good hypoglycemia awareness symptoms  Current Medtronic pump settings:       Recent pump Carelink report:              Recent [facility] labs include:  Lab Results   Component Value Date    A1C 7.6 (H) 10/11/2022     10/11/2022    POTASSIUM 4.9 10/11/2022    CHLORIDE 102 10/11/2022    CO2 28 10/11/2022    ANIONGAP 5 10/11/2022     (H) 10/11/2022    BUN 13 10/11/2022    CR 1.08 10/11/2022    GFRESTIMATED 87 10/11/2022    GFRESTBLACK 84 03/18/2021    WILBERTO 9.3 10/11/2022    CHOL 177 04/26/2022    TRIG 63 04/26/2022    HDL 60 04/26/2022     (H) 04/26/2022    NHDL 117 04/26/2022    UCRR 241 01/28/2022    MICROL 19 01/28/2022    UMALCR 7.88 01/28/2022     Lab Results   Component Value Date    TSH 2.27 04/26/2022     Goes to [facility], Dr. Armenta, BDR noted 5/2021  DM Complications:                Retinopathy                          BDR noted 3/2018        , 2 children, lives in [place].   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 Kevyn Mcintyre/ASHLEY [place]      PMH/PSH:  Past Medical History:   Diagnosis Date     Cervical radiculopathy     finger tingling     Insulin pump status      Left knee injury     MCL, ACL injury     Type 1 diabetes (H)      No past surgical history on file.    Family Hx:  Family History   Problem Relation Age of Onset     Other Cancer Father      Heart Failure Maternal Grandfather          Social Hx:  Social History     Socioeconomic History     Marital status:      Spouse name: Not on file     Number of children: Not on file     Years of education: Not on file     Highest education level: Not on file   Occupational History     Not on file   Tobacco Use     Smoking status: Never     Smokeless tobacco: Never   Substance and Sexual Activity     Alcohol use: Yes     Drug use: No     Sexual activity: Not on file   Other Topics Concern     Parent/sibling w/ CABG, MI or angioplasty before 65F 55M? Not Asked   Social History Narrative     Not on file     Social Determinants of Health     Financial Resource Strain: Not on file   Food Insecurity: Not on file   Transportation Needs: Not on file   Physical Activity: Not on file   Stress: Not on file   Social Connections: Not on file   Intimate Partner Violence: Not on file   Housing Stability: Not on file          MEDICATIONS:  has a current medication list which includes the following prescription(s): baqsimi two pack, insulin aspart, insulin aspart, tresiba flextouch, lisinopril, simvastatin, acetone urine, contour next test, blood glucose, insulin pen needle, insulin pump, and insulin syringe-needle u-100.       ROS: 10 point ROS neg other than the symptoms noted above in the HPI.     GENERAL: energy good, ; denies fevers, chills, malaise, night sweats.    HEENT: no dysphagia, odonophagia, diplopia, neck pain or tenderness  THYROID:  no apparent hyper or hypothyroid symptoms  CV: no chest pain, pressure, palpitations, skipped beats  LUNGS: no SOB, VÁZQUEZ, cough, sputum production, wheezing   ABDOMEN: no diarrhea, constipation, abdominal pain  EXTREMITIES: no apparent rashes, ulcers, edema  NEUROLOGY: no headaches, denies changes in vision, tingling, extremitiy numbness   MSK: occasional ankle (achilles?) pains; no other muscle aches or pains, weakness  SKIN: increased sweating; no rashes or lesions  PSYCH:  stable mood, no significant anxiety or depression  ENDOCRINE: no heat or cold intolerance      Physical Exam   VS: /71 (BP Location: Left arm, Patient Position: Sitting, Cuff Size: Adult Regular)   Pulse 81   Wt 86.8 kg (191 lb 4.8 oz)   BMI 28.25 kg/m    GENERAL: AXOX3, NAD, well dressed, answering questions appropriately, appears stated age.  ENT: no nose swelling or nasal discharge, mouth redness or gum changes.  EYES: eyes grossly normal to inspection, conjunctivae and sclerae normal, no exophthalmos or proptosis  THYROID:  no apparent nodules or goiter  CV:  RRR without murmurs  LUNGS: no audible wheeze, cough or visible cyanosis, or increased work of breathing  ABDOMEN: abdomen normal size  EXTREMITIES: left knee brace in place  NEUROLOGY: CN grossly intact, no tremors  MSK: grossly intact  SKIN:  no apparent skin lesions, rash, or edema with visualized skin appearance  PSYCH: mentation appears normal, affect normal/bright, judgement and insight intact,   normal speech and appearance well groomed    LABS:     All pertinent notes, labs, and images personally reviewed by me.        A/P:  Encounter Diagnoses   Name Primary?     Type 1 diabetes mellitus with background retinopathy (H) Yes     Insulin pump status        Comments:  Reviewed health history and diabetes issues  Improved glucose control with use of the Linguastat 770G pump and sensor system,  but recent post supper hyperglycemia trends.  Reviewed and interpreted tests that I previously ordered.   Ordered appropriate tests for the endocrinology disease management.    Management options discussed and implemented after shared medical decision making with the patient.  T1DM problem is chronic-stable    Plan:  Reviewed the overall T1DM management and insulin pump use.  Discussed optimal BG testing to assess glucose trends.  We reviewed insulin pump settings, basal rate and bolus dosing  Use of automated pump bolus dosing for meal/snack carb & correction dosing  Discussed recent Guardian3 glucose sensor trend data, in detail     Recommend:  Continue current Medtronic pump and CGM sensor, diabetes management plan  Pump setting changes:   Bolus ICR 5p 5.0 to 4.7    Continue use of the insulin pump auto-mode regularly  Use the Temp Target setting for aerobic exercise  Plan non-fasting lab testing soon   Discussed the nearby  PL clinic   Lab orders placed  Plan to take extra diabetes supplies (Nv vial, Tresiba pen, syringes, pen needles) on vacations  We have previously discussed idea of using a Freestyle Libre2 CGM sensor for diving or when his Guardian3 sensor not useful  Continue lisinopril and simvastatin daily dose plan  Arrange annual dilated eye exam, fasting lipid panel testing.  Needs follow-up endocrinology appointments every -4 months     See PCP and/or orthopedic physician for left knee problem  Addressed patient's questions today.    There are no Patient Instructions on file for this visit.    Future labs ordered today:   Orders Placed This Encounter   Procedures     GLUCOSE MONITOR, 72 HOUR, PHYS INTERP     Hemoglobin A1c     Basic metabolic panel     ALT     Albumin Random Urine Quantitative with Creat Ratio     Radiology/Consults ordered today: None    Total time spent on day of encounter:  23 min    Follow-up:  5/23/23 at 10am, Return    ELIZABETH Schneider MD, MS  Endocrinology  [facility]  [place]                          Again, thank you for allowing me to participate in the care of your patient.        Sincerely,        Onur Schneider MD

## 2023-02-09 NOTE — PROGRESS NOTES
Recent issues:  Diabetes follow-up evaluation  Continues to use the Medtronic 770G pump with Guardian 3 sensor  Recent left knee injury while skiing in [place], tear of MCL and some ACL ligaments, now wearing left knee bace  Plans pee skiing from boat [employer] 3/4/23           1990. Diagnosis of diabetes, age 10, living in [place]  Began insulin treatment with insulin injections, recalls taking NPH and Regular insulins  Treatment with multiple daily injection (MDI) plan  Has seen me for diabetes evaluations at my former clinic ([facility])  2008. Switched to Medtronic insulin pump  Subsequent upgrades to other Medtronic pumps  Had used PopUp[employer]ite CGMS sensor  8/2017. Upgraded to Medtronic 670G pump with Guardian3 sensor    Spring 2021. Upgraded to Medtronic 770G pump              Novolog in pump     Uses [email or web address] Contour Link meter, tests 3-4x/day  Perceives good hypoglycemia awareness symptoms  Current Medtronic pump settings:       Recent pump Carelink report:              Recent [facility] labs include:  Lab Results   Component Value Date    A1C 7.6 (H) 10/11/2022     10/11/2022    POTASSIUM 4.9 10/11/2022    CHLORIDE 102 10/11/2022    CO2 28 10/11/2022    ANIONGAP 5 10/11/2022     (H) 10/11/2022    BUN 13 10/11/2022    CR 1.08 10/11/2022    GFRESTIMATED 87 10/11/2022    GFRESTBLACK 84 03/18/2021    WILBERTO 9.3 10/11/2022    CHOL 177 04/26/2022    TRIG 63 04/26/2022    HDL 60 04/26/2022     (H) 04/26/2022    NHDL 117 04/26/2022    UCRR 241 01/28/2022    MICROL 19 01/28/2022    UMALCR 7.88 01/28/2022     Lab Results   Component Value Date    TSH 2.27 04/26/2022     Goes to [facility], Dr. Armenta, BDR noted 5/2021  DM Complications:               Retinopathy                          BDR noted 3/2018        , 2 children, lives in [place].   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 Kevyn Mcintyre/ASHLEY [place]      PMH/PSH:  Past Medical  History:   Diagnosis Date     Cervical radiculopathy     finger tingling     Insulin pump status      Left knee injury     MCL, ACL injury     Type 1 diabetes (H)      No past surgical history on file.    Family Hx:  Family History   Problem Relation Age of Onset     Other Cancer Father      Heart Failure Maternal Grandfather          Social Hx:  Social History     Socioeconomic History     Marital status:      Spouse name: Not on file     Number of children: Not on file     Years of education: Not on file     Highest education level: Not on file   Occupational History     Not on file   Tobacco Use     Smoking status: Never     Smokeless tobacco: Never   Substance and Sexual Activity     Alcohol use: Yes     Drug use: No     Sexual activity: Not on file   Other Topics Concern     Parent/sibling w/ CABG, MI or angioplasty before 65F 55M? Not Asked   Social History Narrative     Not on file     Social Determinants of Health     Financial Resource Strain: Not on file   Food Insecurity: Not on file   Transportation Needs: Not on file   Physical Activity: Not on file   Stress: Not on file   Social Connections: Not on file   Intimate Partner Violence: Not on file   Housing Stability: Not on file          MEDICATIONS:  has a current medication list which includes the following prescription(s): baqsimi two pack, insulin aspart, insulin aspart, tresiba flextouch, lisinopril, simvastatin, acetone urine, contour next test, blood glucose, insulin pen needle, insulin pump, and insulin syringe-needle u-100.       ROS: 10 point ROS neg other than the symptoms noted above in the HPI.     GENERAL: energy good, ; denies fevers, chills, malaise, night sweats.   HEENT: no dysphagia, odonophagia, diplopia, neck pain or tenderness  THYROID:  no apparent hyper or hypothyroid symptoms  CV: no chest pain, pressure, palpitations, skipped beats  LUNGS: no SOB, VÁZQUEZ, cough, sputum production, wheezing   ABDOMEN: no diarrhea,  constipation, abdominal pain  EXTREMITIES: no apparent rashes, ulcers, edema  NEUROLOGY: no headaches, denies changes in vision, tingling, extremitiy numbness   MSK: occasional ankle (achilles?) pains; no other muscle aches or pains, weakness  SKIN: increased sweating; no rashes or lesions  PSYCH:  stable mood, no significant anxiety or depression  ENDOCRINE: no heat or cold intolerance      Physical Exam   VS: /71 (BP Location: Left arm, Patient Position: Sitting, Cuff Size: Adult Regular)   Pulse 81   Wt 86.8 kg (191 lb 4.8 oz)   BMI 28.25 kg/m    GENERAL: AXOX3, NAD, well dressed, answering questions appropriately, appears stated age.  ENT: no nose swelling or nasal discharge, mouth redness or gum changes.  EYES: eyes grossly normal to inspection, conjunctivae and sclerae normal, no exophthalmos or proptosis  THYROID:  no apparent nodules or goiter  CV:  RRR without murmurs  LUNGS: no audible wheeze, cough or visible cyanosis, or increased work of breathing  ABDOMEN: abdomen normal size  EXTREMITIES: left knee brace in place  NEUROLOGY: CN grossly intact, no tremors  MSK: grossly intact  SKIN:  no apparent skin lesions, rash, or edema with visualized skin appearance  PSYCH: mentation appears normal, affect normal/bright, judgement and insight intact,   normal speech and appearance well groomed    LABS:     All pertinent notes, labs, and images personally reviewed by me.        A/P:  Encounter Diagnoses   Name Primary?     Type 1 diabetes mellitus with background retinopathy (H) Yes     Insulin pump status        Comments:  Reviewed health history and diabetes issues  Improved glucose control with use of the Medtronic 770G pump and sensor system, but recent post supper hyperglycemia trends.  Reviewed and interpreted tests that I previously ordered.   Ordered appropriate tests for the endocrinology disease management.    Management options discussed and implemented after shared medical decision making with  the patient.  T1DM problem is chronic-stable    Plan:  Reviewed the overall T1DM management and insulin pump use.  Discussed optimal BG testing to assess glucose trends.  We reviewed insulin pump settings, basal rate and bolus dosing  Use of automated pump bolus dosing for meal/snack carb & correction dosing  Discussed recent Guardian3 glucose sensor trend data, in detail     Recommend:  Continue current Medtronic pump and CGM sensor, diabetes management plan  Pump setting changes:   Bolus ICR 5p 5.0 to 4.7    Continue use of the insulin pump auto-mode regularly  Use the Temp Target setting for aerobic exercise  Plan non-fasting lab testing soon   Discussed the nearby  PL clinic   Lab orders placed  Plan to take extra diabetes supplies (Nv vial, Tresiba pen, syringes, pen needles) on vacations  We have previously discussed idea of using a Freestyle Libre2 CGM sensor for diving or when his Guardian3 sensor not useful  Continue lisinopril and simvastatin daily dose plan  Arrange annual dilated eye exam, fasting lipid panel testing.  Needs follow-up endocrinology appointments every -4 months     See PCP and/or orthopedic physician for left knee problem  Addressed patient's questions today.    There are no Patient Instructions on file for this visit.    Future labs ordered today:   Orders Placed This Encounter   Procedures     GLUCOSE MONITOR, 72 HOUR, PHYS INTERP     Hemoglobin A1c     Basic metabolic panel     ALT     Albumin Random Urine Quantitative with Creat Ratio     Radiology/Consults ordered today: None    Total time spent on day of encounter:  23 min    Follow-up:  5/23/23 at 10am, Return    ELIZABETH Schneider MD, MS  Endocrinology  [facility]

## 2023-02-13 DIAGNOSIS — E10.3299 TYPE 1 DIABETES MELLITUS WITH BACKGROUND RETINOPATHY (H): ICD-10-CM

## 2023-02-14 ENCOUNTER — MYC MEDICAL ADVICE (OUTPATIENT)
Dept: ENDOCRINOLOGY | Facility: CLINIC | Age: 43
End: 2023-02-14
Payer: COMMERCIAL

## 2023-02-15 ENCOUNTER — LAB (OUTPATIENT)
Dept: LAB | Facility: CLINIC | Age: 43
End: 2023-02-15
Payer: COMMERCIAL

## 2023-02-15 DIAGNOSIS — E10.3299 TYPE 1 DIABETES MELLITUS WITH BACKGROUND RETINOPATHY (H): ICD-10-CM

## 2023-02-15 LAB
ALT SERPL W P-5'-P-CCNC: 30 U/L (ref 10–50)
ANION GAP SERPL CALCULATED.3IONS-SCNC: 12 MMOL/L (ref 7–15)
BUN SERPL-MCNC: 17.8 MG/DL (ref 6–20)
CALCIUM SERPL-MCNC: 9.3 MG/DL (ref 8.6–10)
CHLORIDE SERPL-SCNC: 103 MMOL/L (ref 98–107)
CREAT SERPL-MCNC: 1.19 MG/DL (ref 0.67–1.17)
CREAT UR-MCNC: 50.8 MG/DL
DEPRECATED HCO3 PLAS-SCNC: 25 MMOL/L (ref 22–29)
GFR SERPL CREATININE-BSD FRML MDRD: 77 ML/MIN/1.73M2
GLUCOSE SERPL-MCNC: 109 MG/DL (ref 70–99)
HBA1C MFR BLD: 8.4 % (ref 0–5.6)
MICROALBUMIN UR-MCNC: <12 MG/L
MICROALBUMIN/CREAT UR: NORMAL MG/G{CREAT}
POTASSIUM SERPL-SCNC: 4 MMOL/L (ref 3.4–5.3)
SODIUM SERPL-SCNC: 140 MMOL/L (ref 136–145)

## 2023-02-15 PROCEDURE — 82570 ASSAY OF URINE CREATININE: CPT

## 2023-02-15 PROCEDURE — 80048 BASIC METABOLIC PNL TOTAL CA: CPT

## 2023-02-15 PROCEDURE — 82043 UR ALBUMIN QUANTITATIVE: CPT

## 2023-02-15 PROCEDURE — 84460 ALANINE AMINO (ALT) (SGPT): CPT

## 2023-02-15 PROCEDURE — 83036 HEMOGLOBIN GLYCOSYLATED A1C: CPT

## 2023-02-15 PROCEDURE — 36415 COLL VENOUS BLD VENIPUNCTURE: CPT

## 2023-04-01 ENCOUNTER — HEALTH MAINTENANCE LETTER (OUTPATIENT)
Age: 43
End: 2023-04-01

## 2023-05-08 ENCOUNTER — TELEPHONE (OUTPATIENT)
Dept: ENDOCRINOLOGY | Facility: CLINIC | Age: 43
End: 2023-05-08

## 2023-05-08 DIAGNOSIS — E10.3299 TYPE 1 DIABETES MELLITUS WITH BACKGROUND RETINOPATHY (H): Primary | ICD-10-CM

## 2023-05-08 DIAGNOSIS — Z96.41 INSULIN PUMP STATUS: ICD-10-CM

## 2023-05-08 DIAGNOSIS — E10.3299 TYPE 1 DIABETES MELLITUS WITH BACKGROUND RETINOPATHY (H): ICD-10-CM

## 2023-05-08 RX ORDER — INSULIN ASPART 100 [IU]/ML
INJECTION, SOLUTION INTRAVENOUS; SUBCUTANEOUS
Qty: 30 ML | Refills: 0 | OUTPATIENT
Start: 2023-05-08 | End: 2023-05-08

## 2023-05-08 RX ORDER — INSULIN LISPRO 100 [IU]/ML
INJECTION, SOLUTION INTRAVENOUS; SUBCUTANEOUS
Qty: 30 ML | Refills: 3 | Status: SHIPPED | OUTPATIENT
Start: 2023-05-08 | End: 2023-11-15

## 2023-05-08 NOTE — TELEPHONE ENCOUNTER
Novolog not covered. Preferred is Humalog. Would you like to change to preferred or PA    Test claim Humalog $0 copay for 1 month supply

## 2023-05-08 NOTE — TELEPHONE ENCOUNTER
Last Written Prescription Date:  4/19/22  Last Fill Quantity: 30ml,  # refills: 11   Last office visit: 2/9/2023 ; last virtual visit: 12/22/2021 with prescribing provider:Dr Schneider    Future Office Visit:        Requested Prescriptions   Pending Prescriptions Disp Refills     insulin aspart (NOVOLOG VIAL) 100 UNITS/ML vial 30 mL 11     Sig: Use with insulin pump, total daily dose approx 75 units       There is no refill protocol information for this order

## 2023-05-09 NOTE — TELEPHONE ENCOUNTER
Message reviewed.  I advise change to Humalog vial insulin, updated Rx sent to his pharmacy.  Thanks.    ELIZABETH Schneider MD, MS  Endocrinology  [facility]

## 2023-05-23 ENCOUNTER — OFFICE VISIT (OUTPATIENT)
Dept: ENDOCRINOLOGY | Facility: CLINIC | Age: 43
End: 2023-05-23
Payer: COMMERCIAL

## 2023-05-23 VITALS
BODY MASS INDEX: 29.03 KG/M2 | DIASTOLIC BLOOD PRESSURE: 83 MMHG | WEIGHT: 196.6 LBS | HEART RATE: 72 BPM | SYSTOLIC BLOOD PRESSURE: 124 MMHG

## 2023-05-23 DIAGNOSIS — E10.3299 TYPE 1 DIABETES MELLITUS WITH BACKGROUND RETINOPATHY (H): Primary | ICD-10-CM

## 2023-05-23 DIAGNOSIS — E78.5 HYPERLIPIDEMIA LDL GOAL <100: ICD-10-CM

## 2023-05-23 DIAGNOSIS — Z96.41 INSULIN PUMP STATUS: ICD-10-CM

## 2023-05-23 PROCEDURE — 99214 OFFICE O/P EST MOD 30 MIN: CPT | Performed by: INTERNAL MEDICINE

## 2023-05-23 PROCEDURE — 95251 CONT GLUC MNTR ANALYSIS I&R: CPT | Performed by: INTERNAL MEDICINE

## 2023-05-23 NOTE — PROGRESS NOTES
Recent issues:  Diabetes follow-up evaluation  Continues to use the Medtronic 770G pump with Guardian 3 sensor  Recent left knee injury while skiing in [place], tear of MCL and some ACL ligaments, no surgery but uses left knee bace  Previous pee skiing from boat [employer] 3/4/23           1990. Diagnosis of diabetes, age 10, living in [place]  Began insulin treatment with insulin injections, recalls taking NPH and Regular insulins  Treatment with multiple daily injection (MDI) plan  Has seen me for diabetes evaluations at my former clinic ([facility])  2008. Switched to Medtronic insulin pump  Subsequent upgrades to other Medtronic pumps  Had used [employer] CGMS sensor  8/2017. Upgraded to Medtronic 670G pump with Guardian3 sensor    Spring 2021. Upgraded to Medtronic 770G pump              Novolog in pump     Uses [employer] Contour Link meter, tests 3-4x/day  Perceives good hypoglycemia awareness symptoms  Current Medtronic pump settings:       Recent pump Carelink report:                Recent [facility] labs include:  Lab Results   Component Value Date    A1C 8.4 (H) 02/15/2023     02/15/2023    POTASSIUM 4.0 02/15/2023    CHLORIDE 103 02/15/2023    CO2 25 02/15/2023    ANIONGAP 12 02/15/2023     (H) 02/15/2023    BUN 17.8 02/15/2023    CR 1.19 (H) 02/15/2023    GFRESTIMATED 77 02/15/2023    GFRESTBLACK 84 03/18/2021    WILBERTO 9.3 02/15/2023    CHOL 177 04/26/2022    TRIG 63 04/26/2022    HDL 60 04/26/2022     (H) 04/26/2022    NHDL 117 04/26/2022    UCRR 50.8 02/15/2023    MICROL <12.0 02/15/2023    UMALCR  02/15/2023      Comment:      Unable to calculate, urine albumin and/or urine creatinine is outside detectable limits.  Microalbuminuria is defined as an albumin:creatinine ratio of 17 to 299 for males and 25 to 299 for females. A ratio of albumin:creatinine of 300 or higher is indicative of overt proteinuria.  Due to biologic variability, positive results should be confirmed by a second, first-morning random  or 24-hour timed urine specimen. If there is discrepancy, a third specimen is recommended. When 2 out of 3 results are in the microalbuminuria range, this is evidence for incipient nephropathy and warrants increased efforts at glucose control, blood pressure control, and institution of therapy with an angiotensin-converting-enzyme (ACE) inhibitor (if the patient can tolerate it).       Lab Results   Component Value Date    TSH 2.27 04/26/2022     Goes to [facility], Dr. Armenta, BDR noted 5/2021  DM Complications:               Retinopathy                          BDR noted 3/2018        , 2 children, lives in [place].   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 Kevyn Mcintyre/[facility] [place]      PMH/PSH:  Past Medical History:   Diagnosis Date     Cervical radiculopathy     finger tingling     Insulin pump status      Left knee injury     MCL, ACL injury     Type 1 diabetes (H)      No past surgical history on file.    Family Hx:  Family History   Problem Relation Age of Onset     Other Cancer Father      Heart Failure Maternal Grandfather          Social Hx:  Social History     Socioeconomic History     Marital status:      Spouse name: Not on file     Number of children: Not on file     Years of education: Not on file     Highest education level: Not on file   Occupational History     Not on file   Tobacco Use     Smoking status: Never     Smokeless tobacco: Never   Vaping Use     Vaping status: Not on file   Substance and Sexual Activity     Alcohol use: Yes     Drug use: No     Sexual activity: Not on file   Other Topics Concern     Parent/sibling w/ CABG, MI or angioplasty before 65F 55M? Not Asked   Social History Narrative     Not on file     Social Determinants of Health     Financial Resource Strain: Not on file   Food Insecurity: Not on file   Transportation Needs: Not on file   Physical Activity: Not on file   Stress: Not on  file   Social Connections: Not on file   Intimate Partner Violence: Not on file   Housing Stability: Not on file          MEDICATIONS:  has a current medication list which includes the following prescription(s): tresiba flextouch, insulin lispro, insulin pump, lisinopril, simvastatin, acetone urine, contour next test, blood glucose, baqsimi two pack, insulin aspart, insulin pen needle, and insulin syringe-needle u-100.       ROS: 10 point ROS neg other than the symptoms noted above in the HPI.     GENERAL: energy good, ; denies fevers, chills, malaise, night sweats.   HEENT: no dysphagia, odonophagia, diplopia, neck pain or tenderness  THYROID:  no apparent hyper or hypothyroid symptoms  CV: no chest pain, pressure, palpitations, skipped beats  LUNGS: no SOB, VÁZQUEZ, cough, sputum production, wheezing   ABDOMEN: no diarrhea, constipation, abdominal pain  EXTREMITIES: no apparent rashes, ulcers, edema  NEUROLOGY: no headaches, denies changes in vision, tingling, extremitiy numbness   MSK: some left knee pains; no other muscle aches or pains, weakness  SKIN: increased sweating; no rashes or lesions  PSYCH:  stable mood, no significant anxiety or depression  ENDOCRINE: no heat or cold intolerance      Physical Exam   VS: /83   Pulse 72   Wt 89.2 kg (196 lb 9.6 oz)   BMI 29.03 kg/m    GENERAL: AXOX3, NAD, well dressed, answering questions appropriately, appears stated age.  ENT: no nose swelling or nasal discharge, mouth redness or gum changes.  EYES: eyes grossly normal to inspection, conjunctivae and sclerae normal, no exophthalmos or proptosis  THYROID:  no apparent nodules or goiter  LUNGS: no audible wheeze, cough or visible cyanosis, or increased work of breathing  ABDOMEN: abdomen normal size  EXTREMITIES: no ankle edema  NEUROLOGY: CN grossly intact, no tremors  MSK: grossly intact  SKIN:  no apparent skin lesions, rash, or edema with visualized skin appearance  PSYCH: mentation appears normal, affect  normal/bright, judgement and insight intact,   normal speech and appearance well groomed    LABS:     All pertinent notes, labs, and images personally reviewed by me.        A/P:  Encounter Diagnoses   Name Primary?     Type 1 diabetes mellitus with background retinopathy (H) Yes     Insulin pump status      Hyperlipidemia LDL goal <100        Comments:  Reviewed health history and diabetes issues  Improved glucose control with use of the Medtronic 770G pump and sensor system, but recent post supper hyperglycemia trends.  Reviewed and interpreted tests that I previously ordered.   Ordered appropriate tests for the endocrinology disease management.    Management options discussed and implemented after shared medical decision making with the patient.  T1DM problem is chronic-stable    Plan:  Reviewed the overall T1DM management and insulin pump use.  Discussed optimal BG testing to assess glucose trends.  We reviewed insulin pump settings, basal rate and bolus dosing  Use of automated pump bolus dosing for meal/snack carb & correction dosing  Discussed recent Guardian3 glucose sensor trend data, in detail     Recommend:  Continue current Medtronic pump and CGM sensor, diabetes management plan  Pump setting changes:   Bolus ICR 11a 5.7 to 5.4    Continue use of the insulin pump auto-mode regularly  Use the Temp Target setting for aerobic exercise  Encouraged him to upgrade pump to new Medtronic 780G pump with Guardian4 CGM   Reviewed pump upgrade process   He plans to continue use of [employer] for pump supplies  Plan fasting lab testing in 7/2023   Testing at [facility] clinic   Lab orders placed  Plan to take extra diabetes supplies (Nv vial, Tresiba pen, syringes, pen needles) on vacations  Continue lisinopril and simvastatin daily dose plan  Arrange annual dilated eye exam, fasting lipid panel testing.  Needs follow-up endocrinology appointments every -4 months     See PCP and/or orthopedic physician for left knee  problem  Addressed patient's questions today.    There are no Patient Instructions on file for this visit.    Future labs ordered today:   Orders Placed This Encounter   Procedures     GLUCOSE MONITOR, 72 HOUR, PHYS INTERP     Hemoglobin A1c     Basic metabolic panel     TSH with free T4 reflex     Lipid panel reflex to direct LDL Fasting     Radiology/Consults ordered today: None    Total time spent on day of encounter:  27 min    Follow-up:  7/31/23 at 10am, Return    ELIZABETH Schneider MD, MS  Endocrinology  [facility]

## 2023-05-23 NOTE — LETTER
5/23/2023         RE: Mateo Rodriguez  [address]  [facility] [address]        Dear Colleague,    Thank you for referring your patient, Mateo Rodriguez, to the [facility] [place]. Please see a copy of my visit note below.      Recent issues:  Diabetes follow-up evaluation  Continues to use the Medtronic 770G pump with Guardian 3 sensor  Recent left knee injury while skiing in [place], tear of MCL and some ACL ligaments, no surgery but uses left knee bace  Previous pee skiing from [employer] 3/4/23           1990. Diagnosis of diabetes, age 10, living in [place]  Began insulin treatment with insulin injections, recalls taking NPH and Regular insulins  Treatment with multiple daily injection (MDI) plan  Has seen me for diabetes evaluations at my former clinic ([facility])  2008. Switched to Medtronic insulin pump  Subsequent upgrades to other Medtronic pumps  Had used [employer] CGMS sensor  8/2017. Upgraded to Medtronic 670G pump with Guardian3 sensor    Spring 2021. Upgraded to Medtronic 770G pump              Novolog in pump     Uses [employer] Contour Link meter, tests 3-4x/day  Perceives good hypoglycemia awareness symptoms  Current Medtronic pump settings:       Recent pump Carelink report:                Recent [facility] labs include:  Lab Results   Component Value Date    A1C 8.4 (H) 02/15/2023     02/15/2023    POTASSIUM 4.0 02/15/2023    CHLORIDE 103 02/15/2023    CO2 25 02/15/2023    ANIONGAP 12 02/15/2023     (H) 02/15/2023    BUN 17.8 02/15/2023    CR 1.19 (H) 02/15/2023    GFRESTIMATED 77 02/15/2023    GFRESTBLACK 84 03/18/2021    WILBERTO 9.3 02/15/2023    CHOL 177 04/26/2022    TRIG 63 04/26/2022    HDL 60 04/26/2022     (H) 04/26/2022    NHDL 117 04/26/2022    UCRR 50.8 02/15/2023    MICROL <12.0 02/15/2023    UMALCR  02/15/2023      Comment:      Unable to calculate, urine albumin and/or urine creatinine is outside detectable limits.  Microalbuminuria is defined  as an albumin:creatinine ratio of 17 to 299 for males and 25 to 299 for females. A ratio of albumin:creatinine of 300 or higher is indicative of overt proteinuria.  Due to biologic variability, positive results should be confirmed by a second, first-morning random or 24-hour timed urine specimen. If there is discrepancy, a third specimen is recommended. When 2 out of 3 results are in the microalbuminuria range, this is evidence for incipient nephropathy and warrants increased efforts at glucose control, blood pressure control, and institution of therapy with an angiotensin-converting-enzyme (ACE) inhibitor (if the patient can tolerate it).       Lab Results   Component Value Date    TSH 2.27 04/26/2022     Goes to [facility], Dr. Armenta, BDR noted 5/2021  DM Complications:               Retinopathy                          BDR noted 3/2018        , 2 children, lives in [place].   Works as executive with [employer], planning 2nd home in [place], [place]  Sees Dr. Kevyn Mcintyre/[facility] [place]      PMH/PSH:  Past Medical History:   Diagnosis Date     Cervical radiculopathy     finger tingling     Insulin pump status      Left knee injury     MCL, ACL injury     Type 1 diabetes (H)      No past surgical history on file.    Family Hx:  Family History   Problem Relation Age of Onset     Other Cancer Father      Heart Failure Maternal Grandfather          Social Hx:  Social History     Socioeconomic History     Marital status:      Spouse name: Not on file     Number of children: Not on file     Years of education: Not on file     Highest education level: Not on file   Occupational History     Not on file   Tobacco Use     Smoking status: Never     Smokeless tobacco: Never   Vaping Use     Vaping status: Not on file   Substance and Sexual Activity     Alcohol use: Yes     Drug use: No     Sexual activity: Not on file   Other Topics Concern     Parent/sibling w/ CABG, MI or angioplasty  before 65F 55M? Not Asked   Social History Narrative     Not on file     Social Determinants of Health     Financial Resource Strain: Not on file   Food Insecurity: Not on file   Transportation Needs: Not on file   Physical Activity: Not on file   Stress: Not on file   Social Connections: Not on file   Intimate Partner Violence: Not on file   Housing Stability: Not on file          MEDICATIONS:  has a current medication list which includes the following prescription(s): tresiba flextouch, insulin lispro, insulin pump, lisinopril, simvastatin, acetone urine, contour next test, blood glucose, baqsimi two pack, insulin aspart, insulin pen needle, and insulin syringe-needle u-100.       ROS: 10 point ROS neg other than the symptoms noted above in the HPI.     GENERAL: energy good, ; denies fevers, chills, malaise, night sweats.   HEENT: no dysphagia, odonophagia, diplopia, neck pain or tenderness  THYROID:  no apparent hyper or hypothyroid symptoms  CV: no chest pain, pressure, palpitations, skipped beats  LUNGS: no SOB, VÁZQUEZ, cough, sputum production, wheezing   ABDOMEN: no diarrhea, constipation, abdominal pain  EXTREMITIES: no apparent rashes, ulcers, edema  NEUROLOGY: no headaches, denies changes in vision, tingling, extremitiy numbness   MSK: some left knee pains; no other muscle aches or pains, weakness  SKIN: increased sweating; no rashes or lesions  PSYCH:  stable mood, no significant anxiety or depression  ENDOCRINE: no heat or cold intolerance      Physical Exam   VS: /83   Pulse 72   Wt 89.2 kg (196 lb 9.6 oz)   BMI 29.03 kg/m    GENERAL: AXOX3, NAD, well dressed, answering questions appropriately, appears stated age.  ENT: no nose swelling or nasal discharge, mouth redness or gum changes.  EYES: eyes grossly normal to inspection, conjunctivae and sclerae normal, no exophthalmos or proptosis  THYROID:  no apparent nodules or goiter  LUNGS: no audible wheeze, cough or visible cyanosis, or increased  work of breathing  ABDOMEN: abdomen normal size  EXTREMITIES: no ankle edema  NEUROLOGY: CN grossly intact, no tremors  MSK: grossly intact  SKIN:  no apparent skin lesions, rash, or edema with visualized skin appearance  PSYCH: mentation appears normal, affect normal/bright, judgement and insight intact,   normal speech and appearance well groomed    LABS:     All pertinent notes, labs, and images personally reviewed by me.        A/P:  Encounter Diagnoses   Name Primary?     Type 1 diabetes mellitus with background retinopathy (H) Yes     Insulin pump status      Hyperlipidemia LDL goal <100        Comments:  Reviewed health history and diabetes issues  Improved glucose control with use of the Medtronic 770G pump and sensor system, but recent post supper hyperglycemia trends.  Reviewed and interpreted tests that I previously ordered.   Ordered appropriate tests for the endocrinology disease management.    Management options discussed and implemented after shared medical decision making with the patient.  T1DM problem is chronic-stable    Plan:  Reviewed the overall T1DM management and insulin pump use.  Discussed optimal BG testing to assess glucose trends.  We reviewed insulin pump settings, basal rate and bolus dosing  Use of automated pump bolus dosing for meal/snack carb & correction dosing  Discussed recent Guardian3 glucose sensor trend data, in detail     Recommend:  Continue current Medtronic pump and CGM sensor, diabetes management plan  Pump setting changes:   Bolus ICR 11a 5.7 to 5.4    Continue use of the insulin pump auto-mode regularly  Use the Temp Target setting for aerobic exercise  Encouraged him to upgrade pump to new Medtronic 780G pump with Guardian4 CGM   Reviewed pump upgrade process   He plans to continue use of [employer] for pump supplies  Plan fasting lab testing in 7/2023   Testing at [facility] clinic   Lab orders placed  Plan to take extra diabetes supplies (Nv vial, Tresiba pen, syringes,  pen needles) on vacations  Continue lisinopril and simvastatin daily dose plan  Arrange annual dilated eye exam, fasting lipid panel testing.  Needs follow-up endocrinology appointments every -4 months     See PCP and/or orthopedic physician for left knee problem  Addressed patient's questions today.    There are no Patient Instructions on file for this visit.    Future labs ordered today:   Orders Placed This Encounter   Procedures     GLUCOSE MONITOR, 72 HOUR, PHYS INTERP     Hemoglobin A1c     Basic metabolic panel     TSH with free T4 reflex     Lipid panel reflex to direct LDL Fasting     Radiology/Consults ordered today: None    Total time spent on day of encounter:  27 min    Follow-up:  7/31/23 at 10am, Return    ELIZABETH Schneider MD, MS  Endocrinology  [facility]                            Again, thank you for allowing me to participate in the care of your patient.        Sincerely,        Onur Schneider MD

## 2023-06-30 ENCOUNTER — MYC MEDICAL ADVICE (OUTPATIENT)
Dept: ENDOCRINOLOGY | Facility: CLINIC | Age: 43
End: 2023-06-30
Payer: COMMERCIAL

## 2023-08-27 ENCOUNTER — HEALTH MAINTENANCE LETTER (OUTPATIENT)
Age: 43
End: 2023-08-27

## 2023-09-05 ENCOUNTER — LAB (OUTPATIENT)
Dept: LAB | Facility: CLINIC | Age: 43
End: 2023-09-05
Payer: COMMERCIAL

## 2023-09-05 DIAGNOSIS — E10.3299 TYPE 1 DIABETES MELLITUS WITH BACKGROUND RETINOPATHY (H): ICD-10-CM

## 2023-09-05 DIAGNOSIS — E78.5 HYPERLIPIDEMIA LDL GOAL <100: ICD-10-CM

## 2023-09-05 DIAGNOSIS — Z96.41 INSULIN PUMP STATUS: ICD-10-CM

## 2023-09-05 LAB
ANION GAP SERPL CALCULATED.3IONS-SCNC: 12 MMOL/L (ref 7–15)
BUN SERPL-MCNC: 23.2 MG/DL (ref 6–20)
CALCIUM SERPL-MCNC: 9.3 MG/DL (ref 8.6–10)
CHLORIDE SERPL-SCNC: 102 MMOL/L (ref 98–107)
CHOLEST SERPL-MCNC: 181 MG/DL
CREAT SERPL-MCNC: 1.1 MG/DL (ref 0.67–1.17)
DEPRECATED HCO3 PLAS-SCNC: 24 MMOL/L (ref 22–29)
GFR SERPL CREATININE-BSD FRML MDRD: 84 ML/MIN/1.73M2
GLUCOSE SERPL-MCNC: 184 MG/DL (ref 70–99)
HBA1C MFR BLD: 7.9 % (ref 0–5.6)
HDLC SERPL-MCNC: 65 MG/DL
LDLC SERPL CALC-MCNC: 107 MG/DL
NONHDLC SERPL-MCNC: 116 MG/DL
POTASSIUM SERPL-SCNC: 4.9 MMOL/L (ref 3.4–5.3)
SODIUM SERPL-SCNC: 138 MMOL/L (ref 136–145)
TRIGL SERPL-MCNC: 45 MG/DL
TSH SERPL DL<=0.005 MIU/L-ACNC: 1.68 UIU/ML (ref 0.3–4.2)

## 2023-09-05 PROCEDURE — 83036 HEMOGLOBIN GLYCOSYLATED A1C: CPT

## 2023-09-05 PROCEDURE — 80061 LIPID PANEL: CPT

## 2023-09-05 PROCEDURE — 84443 ASSAY THYROID STIM HORMONE: CPT

## 2023-09-05 PROCEDURE — 36415 COLL VENOUS BLD VENIPUNCTURE: CPT

## 2023-09-05 PROCEDURE — 80048 BASIC METABOLIC PNL TOTAL CA: CPT

## 2023-09-06 ENCOUNTER — VIRTUAL VISIT (OUTPATIENT)
Dept: ENDOCRINOLOGY | Facility: CLINIC | Age: 43
End: 2023-09-06
Payer: COMMERCIAL

## 2023-09-06 DIAGNOSIS — E10.3299 TYPE 1 DIABETES MELLITUS WITH BACKGROUND RETINOPATHY (H): Primary | ICD-10-CM

## 2023-09-06 DIAGNOSIS — Z96.41 INSULIN PUMP STATUS: ICD-10-CM

## 2023-09-06 PROCEDURE — 99214 OFFICE O/P EST MOD 30 MIN: CPT | Mod: VID | Performed by: INTERNAL MEDICINE

## 2023-09-06 RX ORDER — ATORVASTATIN CALCIUM 20 MG/1
20 TABLET, FILM COATED ORAL DAILY
Qty: 90 TABLET | Refills: 1 | Status: SHIPPED | OUTPATIENT
Start: 2023-09-06 | End: 2023-12-20

## 2023-09-06 NOTE — Clinical Note
9/6/2023         RE: Mateo Rodriguez  [address]  [facility] [address]        Dear Colleague,    Thank you for referring your patient, Mateo Rodriguez, to the [facility] [place]. Please see a copy of my visit note below.    Virtual Visit Details    Type of service:  Video Visit     Originating Location (pt. Location): Home  Distant Location (provider location):  Off-site  Platform used for Video Visit: Mina        Recent issues:  Diabetes follow-up evaluation  Upgraded pump to Medtronic 780G pump with Guardian 4 sensor ~8/2/23  He perceived tighter glucose control on new pump system    Recent left knee injury while skiing in [place], tear of MCL and some ACL ligaments, no surgery but uses left knee bace  Previous cecile skiing from boat [facility] 3/4/23, Cecile skiing in [facility]   Traveling to [facility] later this week           1990. Diagnosis of diabetes, age 10, living in [place]  Began insulin treatment with insulin injections, recalls taking NPH and Regular insulins  Treatment with multiple daily injection (MDI) plan  Has seen me for diabetes evaluations at my former clinic ([facility])  2008. Switched to Medtronic insulin pump  Subsequent upgrades to other Medtronic pumps  Had used [employer] CGMS sensor  8/2017. Upgraded to Medtronic 670G pump with Guardian3 sensor    Spring 2021. Upgraded to Medtronic 770G pump  8/2023. Upgraded to Medtronic 780G pump/Guardian4 CGM              Novolog in pump     Uses [email or web address] Contour Link meter, tests 3-4x/day  Perceives good hypoglycemia awareness symptoms  Previous Medtronic pump settings:       Recent pump Carelink report:  no information available today    Recent [facility] labs include:  Lab Results   Component Value Date    A1C 7.9 (H) 09/05/2023     09/05/2023    POTASSIUM 4.9 09/05/2023    CHLORIDE 102 09/05/2023    CO2 24 09/05/2023    ANIONGAP 12 09/05/2023     (H) 09/05/2023    BUN 23.2 (H) 09/05/2023    CR 1.10 09/05/2023     GFRESTIMATED 84 09/05/2023    GFRESTBLACK 84 03/18/2021    WILBERTO 9.3 09/05/2023    CHOL 181 09/05/2023    TRIG 45 09/05/2023    HDL 65 09/05/2023     (H) 09/05/2023    NHDL 116 09/05/2023    UCRR 50.8 02/15/2023    MICROL <12.0 02/15/2023    UMALCR  02/15/2023      Comment:      Unable to calculate, urine albumin and/or urine creatinine is outside detectable limits.  Microalbuminuria is defined as an albumin:creatinine ratio of 17 to 299 for males and 25 to 299 for females. A ratio of albumin:creatinine of 300 or higher is indicative of overt proteinuria.  Due to biologic variability, positive results should be confirmed by a second, first-morning random or 24-hour timed urine specimen. If there is discrepancy, a third specimen is recommended. When 2 out of 3 results are in the microalbuminuria range, this is evidence for incipient nephropathy and warrants increased efforts at glucose control, blood pressure control, and institution of therapy with an angiotensin-converting-enzyme (ACE) inhibitor (if the patient can tolerate it).       Lab Results   Component Value Date    TSH 1.68 09/05/2023     Goes to [facility], Dr. Armenta, BDR noted 5/2021  DM Complications:               Retinopathy                          BDR noted 3/2018        , 2 children, lives in [place].   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 Kevyn Mcintyre/[facility] [place]      PMH/PSH:  Past Medical History:   Diagnosis Date    Cervical radiculopathy     finger tingling    Insulin pump status     Left knee injury     MCL, ACL injury    Type 1 diabetes (H)      No past surgical history on file.    Family Hx:  Family History   Problem Relation Age of Onset    Other Cancer Father     Heart Failure Maternal Grandfather          Social Hx:  Social History     Socioeconomic History    Marital status:      Spouse name: Not on file    Number of children: Not on file     Years of education: Not on file    Highest education level: Not on file   Occupational History    Not on file   Tobacco Use    Smoking status: Never    Smokeless tobacco: Never   Substance and Sexual Activity    Alcohol use: Yes    Drug use: No    Sexual activity: Not on file   Other Topics Concern    Parent/sibling w/ CABG, MI or angioplasty before 65F 55M? Not Asked   Social History Narrative    Not on file     Social Determinants of Health     Financial Resource Strain: Not on file   Food Insecurity: Not on file   Transportation Needs: Not on file   Physical Activity: Not on file   Stress: Not on file   Social Connections: Not on file   Intimate Partner Violence: Not on file   Housing Stability: Not on file          MEDICATIONS:  has a current medication list which includes the following prescription(s): acetone urine, contour next test, blood glucose, baqsimi two pack, insulin aspart, tresiba flextouch, insulin lispro, insulin pen needle, insulin pump, insulin syringe-needle u-100, lisinopril, and simvastatin.       ROS: 10 point ROS neg other than the symptoms noted above in the HPI.     GENERAL: energy good, ; denies fevers, chills, malaise, night sweats.   HEENT: no dysphagia, odonophagia, diplopia, neck pain or tenderness  THYROID:  no apparent hyper or hypothyroid symptoms  CV: no chest pain, pressure, palpitations, skipped beats  LUNGS: no SOB, VÁZQUEZ, cough, sputum production, wheezing   ABDOMEN: no diarrhea, constipation, abdominal pain  EXTREMITIES: no apparent rashes, ulcers, edema  NEUROLOGY: no headaches, denies changes in vision, tingling, extremitiy numbness   MSK: some left knee pains; no other muscle aches or pains, weakness  SKIN: increased sweating; no rashes or lesions  PSYCH:  stable mood, no significant anxiety or depression  ENDOCRINE: no heat or cold intolerance    Physical Exam (visual exam)  VS:  no vital signs taken for video visit  CONSTITUTIONAL: healthy, alert and NAD, well dressed,  answering questions appropriately  ENT: no nose swelling or nasal discharge, mouth redness or gum changes.  EYES: eyes grossly normal to inspection, conjunctivae and sclerae normal, no exophthalmos or proptosis  THYROID:  no apparent nodules or goiter  LUNGS: no audible wheeze, cough or visible cyanosis, no visible retractions or increased work of breathing  ABDOMEN: abdomen not evaluated  EXTREMITIES: no hand tremors, limited exam  NEUROLOGY: CN grossly intact, mentation intact and speech normal   SKIN:  no apparent skin lesions, rash, or edema with visualized skin appearance  PSYCH: mentation appears normal, affect normal/bright, judgement and insight intact,   normal speech and appearance well groomed      LABS:     All pertinent notes, labs, and images personally reviewed by me.        A/P:  No diagnosis found.      Comments:  Reviewed health history and diabetes issues  Improved glucose control with use of the Medtronic 770G pump and sensor system, but recent post supper hyperglycemia trends.  Reviewed and interpreted tests that I previously ordered.   Ordered appropriate tests for the endocrinology disease management.    Management options discussed and implemented after shared medical decision making with the patient.  T1DM problem is chronic-stable    Plan:  Reviewed the overall T1DM management and insulin pump use.  Discussed optimal BG testing to assess glucose trends.  We reviewed insulin pump settings, basal rate and bolus dosing  Use of automated pump bolus dosing for meal/snack carb & correction dosing  Reviewed benefit of accessing the Carelink pump and Guardian4 glucose sensor trend data, in detail     Recommend:  Continue current Medtronic 780G pump with Guardian4 CGM sensor, diabetes management plan  No pump setting changes at this time.    Plan to see one of our [facility] Diab Educators   Review recent 780G/Guadian4 data   Assist with his pump data upload process... since unable to do this  today  Continue use of the insulin pump auto-mode regularly  Use the Temp Target setting for aerobic exercise  Plan fasting lab testing in 12/2023   Testing at [facility] clinic   Lab orders placed  Reminded him to take extra diabetes supplies (Nv vial, Tresiba pen, syringes, pen needles) on vacations  Change statin meds from simvastatin 40 mg to atorvastatin 20 mg daily, new Rx sent to pharmacy  Continue lisinopril daily dose plan  Overdue for eye exam with Dr. Armenta, needs to schedule  Arrange annual dilated eye exam, fasting lipid panel testing.  Needs follow-up endocrinology appointments every -4 months     See PCP and/or orthopedic physician for left knee problem  Addressed patient's questions today.    There are no Patient Instructions on file for this visit.    Future labs ordered today: No orders of the defined types were placed in this encounter.    Radiology/Consults ordered today: None    Total time spent on day of encounter:  ***    Follow-up:  12/18/23 at 12 noon, Return    ELIZABETH Schneider MD, MS  Endocrinology  [facility]                          Virtual Visit Details    Type of service:  Video Visit     Originating Location (pt. Location): Home  Distant Location (provider location):  Off-site  Platform used for Video Visit: Mina        Recent issues:  Diabetes follow-up evaluation  Upgraded pump to Medtronic 780G pump with Guardian 4 sensor ~8/2/23  He perceived tighter glucose control on new pump system  [place] [facility] 3/4/[place], Cecile skiing in [place]   Traveling to [place] later this week           1990. Diagnosis of diabetes, age 10, living in [place]  Began insulin treatment with insulin injections, recalls taking NPH and Regular insulins  Treatment with multiple daily injection (MDI) plan  Has seen me for diabetes evaluations at my former clinic ([facility])  2008. Switched to Medtronic insulin pump  Subsequent upgrades to other Medtronic pumps  Had used [employer] CGMS  sensor  8/2017. Upgraded to Medtronic 670G pump with Guardian3 sensor    Spring 2021. Upgraded to Medtronic 770G pump  8/2023. Upgraded to Medtronic 780G pump/Guardian4 CGM              Novolog in pump     Uses [employer] Contour Link meter, tests 3-4x/day  Perceives good hypoglycemia awareness symptoms  Previous Medtronic pump settings:       Recent pump Carelink report:  no information available today    Recent [facility] labs include:  Lab Results   Component Value Date    A1C 7.9 (H) 09/05/2023     09/05/2023    POTASSIUM 4.9 09/05/2023    CHLORIDE 102 09/05/2023    CO2 24 09/05/2023    ANIONGAP 12 09/05/2023     (H) 09/05/2023    BUN 23.2 (H) 09/05/2023    CR 1.10 09/05/2023    GFRESTIMATED 84 09/05/2023    GFRESTBLACK 84 03/18/2021    WILBERTO 9.3 09/05/2023    CHOL 181 09/05/2023    TRIG 45 09/05/2023    HDL 65 09/05/2023     (H) 09/05/2023    NHDL 116 09/05/2023    UCRR 50.8 02/15/2023    MICROL <12.0 02/15/2023    UMALCR  02/15/2023      Comment:      Unable to calculate, urine albumin and/or urine creatinine is outside detectable limits.  Microalbuminuria is defined as an albumin:creatinine ratio of 17 to 299 for males and 25 to 299 for females. A ratio of albumin:creatinine of 300 or higher is indicative of overt proteinuria.  Due to biologic variability, positive results should be confirmed by a second, first-morning random or 24-hour timed urine specimen. If there is discrepancy, a third specimen is recommended. When 2 out of 3 results are in the microalbuminuria range, this is evidence for incipient nephropathy and warrants increased efforts at glucose control, blood pressure control, and institution of therapy with an angiotensin-converting-enzyme (ACE) inhibitor (if the patient can tolerate it).       Lab Results   Component Value Date    TSH 1.68 09/05/2023     Goes to [facility], Dr. Armenta, BDR noted 5/2021  DM Complications:               Retinopathy                          BDR  noted 3/2018        , 2 children, lives in [place].   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 Kevyn Mcintyre/[facility] [place]      PMH/PSH:  Past Medical History:   Diagnosis Date     Cervical radiculopathy     finger tingling     Insulin pump status      Left knee injury     MCL, ACL injury     Type 1 diabetes (H)      No past surgical history on file.    Family Hx:  Family History   Problem Relation Age of Onset     Other Cancer Father      Heart Failure Maternal Grandfather          Social Hx:  Social History     Socioeconomic History     Marital status:      Spouse name: Not on file     Number of children: Not on file     Years of education: Not on file     Highest education level: Not on file   Occupational History     Not on file   Tobacco Use     Smoking status: Never     Smokeless tobacco: Never   Substance and Sexual Activity     Alcohol use: Yes     Drug use: No     Sexual activity: Not on file   Other Topics Concern     Parent/sibling w/ CABG, MI or angioplasty before 65F 55M? Not Asked   Social History Narrative     Not on file     Social Determinants of Health     Financial Resource Strain: Not on file   Food Insecurity: Not on file   Transportation Needs: Not on file   Physical Activity: Not on file   Stress: Not on file   Social Connections: Not on file   Intimate Partner Violence: Not on file   Housing Stability: Not on file          MEDICATIONS:  has a current medication list which includes the following prescription(s): atorvastatin, tresiba flextouch, insulin lispro, insulin pump, lisinopril, acetone urine, contour next test, blood glucose, baqsimi two pack, insulin aspart, insulin pen needle, and insulin syringe-needle u-100.       ROS: 10 point ROS neg other than the symptoms noted above in the HPI.     GENERAL: energy good, ; denies fevers, chills, malaise, night sweats.   HEENT: no dysphagia, odonophagia, diplopia, neck pain or  tenderness  THYROID:  no apparent hyper or hypothyroid symptoms  CV: no chest pain, pressure, palpitations, skipped beats  LUNGS: no SOB, VÁZQUEZ, cough, sputum production, wheezing   ABDOMEN: no diarrhea, constipation, abdominal pain  EXTREMITIES: no apparent rashes, ulcers, edema  NEUROLOGY: no headaches, denies changes in vision, tingling, extremitiy numbness   MSK: some left knee pains; no other muscle aches or pains, weakness  SKIN: increased sweating; no rashes or lesions  PSYCH:  stable mood, no significant anxiety or depression  ENDOCRINE: no heat or cold intolerance    Physical Exam (visual exam)  VS:  no vital signs taken for video visit  CONSTITUTIONAL: healthy, alert and NAD, well dressed, answering questions appropriately  ENT: no nose swelling or nasal discharge, mouth redness or gum changes.  EYES: eyes grossly normal to inspection, conjunctivae and sclerae normal, no exophthalmos or proptosis  THYROID:  no apparent nodules or goiter  LUNGS: no audible wheeze, cough or visible cyanosis, no visible retractions or increased work of breathing  ABDOMEN: abdomen not evaluated  EXTREMITIES: no hand tremors, limited exam  NEUROLOGY: CN grossly intact, mentation intact and speech normal   SKIN:  no apparent skin lesions, rash, or edema with visualized skin appearance  PSYCH: mentation appears normal, affect normal/bright, judgement and insight intact,   normal speech and appearance well groomed      LABS:     All pertinent notes, labs, and images personally reviewed by me.        A/P:  Encounter Diagnoses   Name Primary?     Type 1 diabetes mellitus with background retinopathy (H) Yes     Insulin pump status      Comments:  Reviewed health history and diabetes issues  Need to access [employer] data at diabetes appointments  Reviewed and interpreted tests that I previously ordered.   Ordered appropriate tests for the endocrinology disease management.    Management options discussed and implemented after  shared medical decision making with the patient.  T1DM problem is chronic-stable    Plan:  Reviewed the overall T1DM management and insulin pump use.  Discussed optimal BG testing to assess glucose trends.  We reviewed insulin pump settings, basal rate and bolus dosing  Use of automated pump bolus dosing for meal/snack carb & correction dosing  Reviewed benefit of accessing the Carelink pump and Guardian4 glucose sensor trend data, in detail     Recommend:  Continue current Medtronic 780G pump with Guardian4 CGM sensor, diabetes management plan  No pump setting changes at this time.    Plan to see one of our [facility] Diab Educators   Review recent 780G/Guadian4 data   Assist with his pump data upload process... since unable to do this today  Continue use of the insulin pump auto-mode regularly  Use the Temp Target setting for aerobic exercise  Plan fasting lab testing in 12/2023   Testing at [facility] clinic   Lab orders placed  Reminded him to take extra diabetes supplies (Nv vial, Tresiba pen, syringes, pen needles) on vacations  Change statin meds from simvastatin 40 mg to atorvastatin 20 mg daily, new Rx sent to pharmacy  Continue lisinopril daily dose plan  Overdue for eye exam with Dr. Armenta, needs to schedule  Arrange annual dilated eye exam, fasting lipid panel testing.  Needs follow-up endocrinology appointments every -4 months     See PCP and/or orthopedic physician for left knee problem  Addressed patient's questions today.    There are no Patient Instructions on file for this visit.    Future labs ordered today:   Orders Placed This Encounter   Procedures     Hemoglobin A1c     Basic metabolic panel     Lipid panel reflex to direct LDL Fasting     Amb Adult Diabetes Educator Referral     Radiology/Consults ordered today: AMBULATORY ADULT DIABETES EDUCATOR REFERRAL    Total time spent on day of encounter:  36 min    Follow-up:  12/18/23 at 12 noon, Return    ELIZABETH Schneider MD, MS  Endocrinology  [facility]  [place]                            Again, thank you for allowing me to participate in the care of your patient.        Sincerely,        Onur Schneider MD

## 2023-09-06 NOTE — PROGRESS NOTES
Virtual Visit Details    Type of service:  Video Visit     Originating Location (pt. Location): Home  Distant Location (provider location):  Off-site  Platform used for Video Visit: Mina        Recent issues:  Diabetes follow-up evaluation  Upgraded pump to Medtronic 780G pump with Guardian 4 sensor ~8/2/23  He perceived tighter glucose control on new pump system  Previous cecile skiing from boat [facility] 3/4/23, Cecile skiing in [place]   Traveling to [facility] later this week           1990. Diagnosis of diabetes, age 10, living in [place]  Began insulin treatment with insulin injections, recalls taking NPH and Regular insulins  Treatment with multiple daily injection (MDI) plan  Has seen me for diabetes evaluations at my former clinic ([facility])  2008. Switched to Medtronic insulin pump  Subsequent upgrades to other Medtronic pumps  Had used [employer]on Discoveryite CGMS sensor  8/2017. Upgraded to Medtronic 670G pump with Guardian3 sensor    Spring 2021. Upgraded to Medtronic 770G pump  8/2023. Upgraded to Medtronic 780G pump/Guardian4 CGM              Novolog in pump     Uses [employer] Contour Link meter, tests 3-4x/day  Perceives good hypoglycemia awareness symptoms  Previous Medtronic pump settings:       Recent pump Carelink report:  no information available today    Recent [facility] labs include:  Lab Results   Component Value Date    A1C 7.9 (H) 09/05/2023     09/05/2023    POTASSIUM 4.9 09/05/2023    CHLORIDE 102 09/05/2023    CO2 24 09/05/2023    ANIONGAP 12 09/05/2023     (H) 09/05/2023    BUN 23.2 (H) 09/05/2023    CR 1.10 09/05/2023    GFRESTIMATED 84 09/05/2023    GFRESTBLACK 84 03/18/2021    WILBERTO 9.3 09/05/2023    CHOL 181 09/05/2023    TRIG 45 09/05/2023    HDL 65 09/05/2023     (H) 09/05/2023    NHDL 116 09/05/2023    UCRR 50.8 02/15/2023    MICROL <12.0 02/15/2023    UMALCR  02/15/2023      Comment:      Unable to calculate, urine albumin and/or urine creatinine is outside detectable limits.  Microalbuminuria  is defined as an albumin:creatinine ratio of 17 to 299 for males and 25 to 299 for females. A ratio of albumin:creatinine of 300 or higher is indicative of overt proteinuria.  Due to biologic variability, positive results should be confirmed by a second, first-morning random or 24-hour timed urine specimen. If there is discrepancy, a third specimen is recommended. When 2 out of 3 results are in the microalbuminuria range, this is evidence for incipient nephropathy and warrants increased efforts at glucose control, blood pressure control, and institution of therapy with an angiotensin-converting-enzyme (ACE) inhibitor (if the patient can tolerate it).       Lab Results   Component Value Date    TSH 1.68 09/05/2023     Goes to [facility], Dr. Armenta, BDR noted 5/2021  DM Complications:               Retinopathy                          BDR noted 3/2018        , 2 children, lives in [place].   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 Kevyn Mcintyre/ASHLEY [place]      PMH/PSH:  Past Medical History:   Diagnosis Date    Cervical radiculopathy     finger tingling    Insulin pump status     Left knee injury     MCL, ACL injury    Type 1 diabetes (H)      No past surgical history on file.    Family Hx:  Family History   Problem Relation Age of Onset    Other Cancer Father     Heart Failure Maternal Grandfather          Social Hx:  Social History     Socioeconomic History    Marital status:      Spouse name: Not on file    Number of children: Not on file    Years of education: Not on file    Highest education level: Not on file   Occupational History    Not on file   Tobacco Use    Smoking status: Never    Smokeless tobacco: Never   Substance and Sexual Activity    Alcohol use: Yes    Drug use: No    Sexual activity: Not on file   Other Topics Concern    Parent/sibling w/ CABG, MI or angioplasty before 65F 55M? Not Asked   Social History  Narrative    Not on file     Social Determinants of Health     Financial Resource Strain: Not on file   Food Insecurity: Not on file   Transportation Needs: Not on file   Physical Activity: Not on file   Stress: Not on file   Social Connections: Not on file   Intimate Partner Violence: Not on file   Housing Stability: Not on file          MEDICATIONS:  has a current medication list which includes the following prescription(s): atorvastatin, tresiba flextouch, insulin lispro, insulin pump, lisinopril, acetone urine, contour next test, blood glucose, baqsimi two pack, insulin aspart, insulin pen needle, and insulin syringe-needle u-100.       ROS: 10 point ROS neg other than the symptoms noted above in the HPI.     GENERAL: energy good, ; denies fevers, chills, malaise, night sweats.   HEENT: no dysphagia, odonophagia, diplopia, neck pain or tenderness  THYROID:  no apparent hyper or hypothyroid symptoms  CV: no chest pain, pressure, palpitations, skipped beats  LUNGS: no SOB, VÁZQUEZ, cough, sputum production, wheezing   ABDOMEN: no diarrhea, constipation, abdominal pain  EXTREMITIES: no apparent rashes, ulcers, edema  NEUROLOGY: no headaches, denies changes in vision, tingling, extremitiy numbness   MSK: some left knee pains; no other muscle aches or pains, weakness  SKIN: increased sweating; no rashes or lesions  PSYCH:  stable mood, no significant anxiety or depression  ENDOCRINE: no heat or cold intolerance    Physical Exam (visual exam)  VS:  no vital signs taken for video visit  CONSTITUTIONAL: healthy, alert and NAD, well dressed, answering questions appropriately  ENT: no nose swelling or nasal discharge, mouth redness or gum changes.  EYES: eyes grossly normal to inspection, conjunctivae and sclerae normal, no exophthalmos or proptosis  THYROID:  no apparent nodules or goiter  LUNGS: no audible wheeze, cough or visible cyanosis, no visible retractions or increased work of breathing  ABDOMEN: abdomen not  evaluated  EXTREMITIES: no hand tremors, limited exam  NEUROLOGY: CN grossly intact, mentation intact and speech normal   SKIN:  no apparent skin lesions, rash, or edema with visualized skin appearance  PSYCH: mentation appears normal, affect normal/bright, judgement and insight intact,   normal speech and appearance well groomed      LABS:     All pertinent notes, labs, and images personally reviewed by me.        A/P:  Encounter Diagnoses   Name Primary?    Type 1 diabetes mellitus with background retinopathy (H) Yes    Insulin pump status      Comments:  Reviewed health history and diabetes issues  Need to access Synup Carelink data at diabetes appointments  Reviewed and interpreted tests that I previously ordered.   Ordered appropriate tests for the endocrinology disease management.    Management options discussed and implemented after shared medical decision making with the patient.  T1DM problem is chronic-stable    Plan:  Reviewed the overall T1DM management and insulin pump use.  Discussed optimal BG testing to assess glucose trends.  We reviewed insulin pump settings, basal rate and bolus dosing  Use of automated pump bolus dosing for meal/snack carb & correction dosing  Reviewed benefit of accessing the Carelink pump and Guardian4 glucose sensor trend data, in detail     Recommend:  Continue current Medtronic 780G pump with Guardian4 CGM sensor, diabetes management plan  No pump setting changes at this time.    Plan to see one of our [facility] Diab Educators   Review recent 780G/Guadian4 data   Assist with his pump data upload process... since unable to do this today  Continue use of the insulin pump auto-mode regularly  Use the Temp Target setting for aerobic exercise  Plan fasting lab testing in 12/2023   Testing at [facility] clinic   Lab orders placed  Reminded him to take extra diabetes supplies (Nv vial, Tresiba pen, syringes, pen needles) on vacations  Change statin meds from simvastatin 40 mg to  atorvastatin 20 mg daily, new Rx sent to pharmacy  Continue lisinopril daily dose plan  Overdue for eye exam with Dr. Armenta, needs to schedule  Arrange annual dilated eye exam, fasting lipid panel testing.  Needs follow-up endocrinology appointments every -4 months     See PCP and/or orthopedic physician for left knee problem  Addressed patient's questions today.    There are no Patient Instructions on file for this visit.    Future labs ordered today:   Orders Placed This Encounter   Procedures    Hemoglobin A1c    Basic metabolic panel    Lipid panel reflex to direct LDL Fasting    Amb Adult Diabetes Educator Referral     Radiology/Consults ordered today: AMBULATORY ADULT DIABETES EDUCATOR REFERRAL    Total time spent on day of encounter:  36 min    Follow-up:  12/18/23 at 12 noon, Return    ELIZABETH Schneider MD, MS  Endocrinology  [facility]

## 2023-11-01 ENCOUNTER — ALLIED HEALTH/NURSE VISIT (OUTPATIENT)
Dept: EDUCATION SERVICES | Facility: CLINIC | Age: 43
End: 2023-11-01
Payer: COMMERCIAL

## 2023-11-01 DIAGNOSIS — Z96.41 INSULIN PUMP STATUS: ICD-10-CM

## 2023-11-01 DIAGNOSIS — E10.3299 TYPE 1 DIABETES MELLITUS WITH BACKGROUND RETINOPATHY (H): ICD-10-CM

## 2023-11-01 PROCEDURE — G0108 DIAB MANAGE TRN  PER INDIV: HCPCS | Mod: AE

## 2023-11-01 NOTE — PROGRESS NOTES
Diabetes Self-Management Education & Support    Presents for:      Type of Service: In Person Visit    Assessment Type:   ASSESSMENT:  Pt referred by his endocrinologist to [facility] for help with pump settings, elisha use and connecting data to clinic practice. Diagnosed with Type 1 Diabetes at age 10. Has been using an insulin pump for many years and is very cmfortablel with pump and sensor use. Current pump:Medtronic 780G with Guardian 4 sensor.     [facility] and [facility] spoke with Vannessa Malloy to help with elisha issues and connecting data to clinic practice. Pt had downloaded three different Medtronic apps but was not sure of which one to use. He also needed help setting up CareLink to be able to connect data to the clinic. Haley was very helpful with these issues and all were resolved.     Pump setting changes made based on patients concerns for hyperglycemia at certain times of the day.   - BOLUS: 11a-5p: 5.4 to 5.0,  5p-12a: 4.7 to 4.3,  12a-11a: 6.2 (no change)  - BASAL: 12a-7a: 0.875 to 0.900,  7a-12p: 1.15 (no change), 12p-3p - changed to 5pm-no change to rate: 0.950,  5p-12: 0.900-no change to rate   - ISF: 12am-12am: 31 to 26  - Max Bolus: 10 to 14       Medtronic CareLink Report -                                     Patient's most recent   Lab Results   Component Value Date    A1C 7.9 09/05/2023    A1C 8.2 03/18/2021     is not meeting goal of <7.0    Diabetes knowledge and skills assessment:   Patient is knowledgeable in diabetes management concepts related to: Being Active, Monitoring, and Taking Medication    Continue education with the following diabetes management concepts: Problem Solving    Based on learning assessment above, most appropriate setting for further diabetes education would be: Individual setting.      PLAN  -  Pt has appt with Endo in Dec. Will schedule follow-up with [facility] if Endo decides it's needed.     See Care Plan for co-developed, patient-state behavior change goals.  AVS  "provided for patient today.    Patient Problem List and Family Medical History reviewed for relevant medical history, current medical status, and diabetes risk factors.    Vitals:  There were no vitals taken for this visit.  Estimated body mass index is 29.03 kg/m  as calculated from the following:    Height as of 6/12/20: 1.753 m (5' 9\").    Weight as of 5/23/23: 89.2 kg (196 lb 9.6 oz).   Last 3 BP:   BP Readings from Last 3 Encounters:   05/23/23 124/83   02/09/23 107/71   09/14/22 120/82       History   Smoking Status    Never   Smokeless Tobacco    Never       Labs:  Lab Results   Component Value Date    A1C 7.9 09/05/2023    A1C 8.2 03/18/2021     Lab Results   Component Value Date     09/05/2023     10/11/2022     03/18/2021     Lab Results   Component Value Date     09/05/2023    LDL 91 06/15/2020     HDL Cholesterol   Date Value Ref Range Status   06/15/2020 70 >39 mg/dL Final     Direct Measure HDL   Date Value Ref Range Status   09/05/2023 65 >=40 mg/dL Final   ]  GFR Estimate   Date Value Ref Range Status   09/05/2023 84 >60 mL/min/1.73m2 Final   03/18/2021 72 >60 mL/min/[1.73_m2] Final     Comment:     Non  GFR Calc  Starting 12/18/2018, serum creatinine based estimated GFR (eGFR) will be   calculated using the Chronic Kidney Disease Epidemiology Collaboration   (CKD-EPI) equation.       GFR Estimate If Black   Date Value Ref Range Status   03/18/2021 84 >60 mL/min/[1.73_m2] Final     Comment:      GFR Calc  Starting 12/18/2018, serum creatinine based estimated GFR (eGFR) will be   calculated using the Chronic Kidney Disease Epidemiology Collaboration   (CKD-EPI) equation.       Lab Results   Component Value Date    CR 1.10 09/05/2023    CR 1.22 03/18/2021     No results found for: \"MICROALBUMIN\"    Taking Medications:  Diabetes Medication(s)       Diabetic Other       Glucagon (BAQSIMI TWO PACK) 3 MG/DOSE POWD    Spray 1 each in nostril once " as needed (In the event of low blood sugar)      Insulin       insulin aspart (NOVOLOG PEN) 100 UNIT/ML pen    Inject 3-12 units subcutaneous with meals and correction doses as directed, total daily dose approx 25 units.     Patient not taking: Reported on 5/23/2023     insulin degludec (TRESIBA FLEXTOUCH) 100 UNIT/ML pen    Inject 27 units daily as directed, insulin pens as backup for his insulin pump use     insulin lispro (HUMALOG VIAL) 100 UNIT/ML vial    Use with insulin pump, total daily dose approx 75U          Patient Activation Measure Survey Score:       No data to display              Time Spent: 60 minutes  Encounter Type: Individual    Any diabetes medication dose changes were made via the CDE Protocol per the patient's endocrinology provider. A copy of this encounter was shared with the provider.    Leora Richmond RN, BSN, [facility]   Certified Diabetes Care &   [facility]

## 2023-11-01 NOTE — LETTER
11/1/2023         RE: Mateo Rodriguez  [address]  [facility] [place] [facility] [address]        Dear Colleague,    [facility] Please see a copy of my visit note below.    Diabetes Self-Management Education & Support    Presents for:      Type of Service: In Person Visit    Assessment Type:   ASSESSMENT:  Pt referred by his endocrinologist to [facility] for help with pump settings, elisha use and connecting data to clinic practice. Diagnosed with Type 1 Diabetes at age 10. Has been using an insulin pump for many years and is very cmfortablel with pump and sensor use. Current pump:Medtronic 780G with Guardian 4 sensor.     TENZIN and patient spoke with Haley Zazueta Medtronic Rep to help with elisha issues and connecting data to clinic practice. Pt had downloaded three different [employer] apps but was not sure of which one to use. He also needed help setting up CareLink to be able to connect data to the clinic. Haley was very helpful with these issues and all were resolved.     Pump setting changes made based on patients concerns for hyperglycemia at certain times of the day.   - BOLUS: 11a-5p: 5.4 to 5.0,  5p-12a: 4.7 to 4.3,  12a-11a: 6.2 (no change)  - BASAL: 12a-7a: 0.875 to 0.900,  7a-12p: 1.15 (no change), 12p-3p - changed to 5pm-no change to rate: 0.950,  5p-12: 0.900-no change to rate   - ISF: 12am-12am: 31 to 26  - Max Bolus: 10 to 14       Medtronic CareLink Report -                                     Patient's most recent   Lab Results   Component Value Date    A1C 7.9 09/05/2023    A1C 8.2 03/18/2021     is not meeting goal of <7.0    Diabetes knowledge and skills assessment:   Patient is knowledgeable in diabetes management concepts related to: Being Active, Monitoring, and Taking Medication    Continue education with the following diabetes management concepts: Problem Solving    Based on learning assessment above, most  "appropriate setting for further diabetes education would be: Individual setting.      PLAN  -  Pt has appt with Endo in Dec. [facility]     See Care Plan for co-developed, patient-state behavior change goals.  AVS provided for patient today.    Patient Problem List and Family Medical History reviewed for relevant medical history, current medical status, and diabetes risk factors.    Vitals:  There were no vitals taken for this visit.  Estimated body mass index is 29.03 kg/m  as calculated from the following:    Height as of 6/12/20: 1.753 m (5' 9\").    Weight as of 5/23/23: 89.2 kg (196 lb 9.6 oz).   Last 3 BP:   BP Readings from Last 3 Encounters:   05/23/23 124/83   02/09/23 107/71   09/14/22 120/82       History   Smoking Status     Never   Smokeless Tobacco     Never       Labs:  Lab Results   Component Value Date    A1C 7.9 09/05/2023    A1C 8.2 03/18/2021     Lab Results   Component Value Date     09/05/2023     10/11/2022     03/18/2021     Lab Results   Component Value Date     09/05/2023    LDL 91 06/15/2020     HDL Cholesterol   Date Value Ref Range Status   06/15/2020 70 >39 mg/dL Final     Direct Measure HDL   Date Value Ref Range Status   09/05/2023 65 >=40 mg/dL Final   ]  GFR Estimate   Date Value Ref Range Status   09/05/2023 84 >60 mL/min/1.73m2 Final   03/18/2021 72 >60 mL/min/[1.73_m2] Final     Comment:     Non  GFR Calc  Starting 12/18/2018, serum creatinine based estimated GFR (eGFR) will be   calculated using the Chronic Kidney Disease Epidemiology Collaboration   (CKD-EPI) equation.       GFR Estimate If Black   Date Value Ref Range Status   03/18/2021 84 >60 mL/min/[1.73_m2] Final     Comment:      GFR Calc  Starting 12/18/2018, serum creatinine based estimated GFR (eGFR) will be   calculated using the Chronic Kidney Disease Epidemiology Collaboration   (CKD-EPI) equation.   " "    Lab Results   Component Value Date    CR 1.10 09/05/2023    CR 1.22 03/18/2021     No results found for: \"MICROALBUMIN\"    Taking Medications:  Diabetes Medication(s)       Diabetic Other       Glucagon (BAQSIMI TWO PACK) 3 MG/DOSE POWD    Spray 1 each in nostril once as needed (In the event of low blood sugar)      Insulin       insulin aspart (NOVOLOG PEN) 100 UNIT/ML pen    Inject 3-12 units subcutaneous with meals and correction doses as directed, total daily dose approx 25 units.     Patient not taking: Reported on 5/23/2023     insulin degludec (TRESIBA FLEXTOUCH) 100 UNIT/ML pen    Inject 27 units daily as directed, insulin pens as backup for his insulin pump use     insulin lispro (HUMALOG VIAL) 100 UNIT/ML vial    Use with insulin pump, total daily dose approx 75U          Patient Activation Measure Survey Score:       No data to display              Time Spent: 60 minutes  Encounter Type: Individual    Any diabetes medication dose changes were made via the CDE Protocol per the patient's endocrinology provider. A copy of this encounter was shared with the provider.    Leora Richmond, RN, BSN, [facility]   Certified Diabetes Care &   [facility]       "

## 2023-11-14 ENCOUNTER — MYC MEDICAL ADVICE (OUTPATIENT)
Dept: ENDOCRINOLOGY | Facility: CLINIC | Age: 43
End: 2023-11-14
Payer: COMMERCIAL

## 2023-11-14 DIAGNOSIS — Z96.41 INSULIN PUMP STATUS: ICD-10-CM

## 2023-11-14 DIAGNOSIS — E10.3299 TYPE 1 DIABETES MELLITUS WITH BACKGROUND RETINOPATHY (H): ICD-10-CM

## 2023-11-15 RX ORDER — INSULIN LISPRO 100 [IU]/ML
INJECTION, SOLUTION INTRAVENOUS; SUBCUTANEOUS
Qty: 30 ML | Refills: 3 | Status: SHIPPED | OUTPATIENT
Start: 2023-11-15 | End: 2024-03-15

## 2023-11-15 NOTE — TELEPHONE ENCOUNTER
Last Written Prescription Date:  5/8/23  Last Fill Quantity: 30,  # refills: 3   Last office visit:  9/6/2023 with prescribing provider:  Dr. Schneider   Future Office Visit:  12/18/23        Requested Prescriptions   Pending Prescriptions Disp Refills    insulin lispro (HUMALOG VIAL) 100 UNIT/ML vial 30 mL 3     Sig: Use with insulin pump, total daily dose approx 75U       There is no refill protocol information for this order        Refills sent  Aaliyah López RN      
3

## 2023-11-27 ENCOUNTER — TRANSFERRED RECORDS (OUTPATIENT)
Dept: HEALTH INFORMATION MANAGEMENT | Facility: CLINIC | Age: 43
End: 2023-11-27
Payer: COMMERCIAL

## 2023-11-27 LAB — RETINOPATHY: POSITIVE

## 2023-12-08 ENCOUNTER — MYC REFILL (OUTPATIENT)
Dept: ENDOCRINOLOGY | Facility: CLINIC | Age: 43
End: 2023-12-08
Payer: COMMERCIAL

## 2023-12-08 DIAGNOSIS — E10.3299 TYPE 1 DIABETES MELLITUS WITH BACKGROUND RETINOPATHY (H): ICD-10-CM

## 2023-12-08 DIAGNOSIS — I10 ESSENTIAL HYPERTENSION: ICD-10-CM

## 2023-12-08 DIAGNOSIS — Z96.41 INSULIN PUMP STATUS: ICD-10-CM

## 2023-12-08 RX ORDER — LISINOPRIL 10 MG/1
10 TABLET ORAL DAILY
Qty: 90 TABLET | Refills: 1 | Status: SHIPPED | OUTPATIENT
Start: 2023-12-08 | End: 2024-03-15

## 2023-12-08 RX ORDER — INSULIN LISPRO 100 [IU]/ML
INJECTION, SOLUTION INTRAVENOUS; SUBCUTANEOUS
Qty: 30 ML | Refills: 3 | OUTPATIENT
Start: 2023-12-08

## 2023-12-08 NOTE — TELEPHONE ENCOUNTER
"Last Written Prescription Date:  9/14/22  Last Fill Quantity: 90,  # refills: 3   Last office visit: 9/6/2023 with prescribing provider:  Dr. Schneider   Future Office Visit:  12/18/23        Requested Prescriptions   Pending Prescriptions Disp Refills    lisinopril (ZESTRIL) 10 MG tablet 90 tablet 3     Sig: Take 1 tablet (10 mg) by mouth daily       ACE Inhibitors (Including Combos) Protocol Passed - 12/8/2023  8:29 AM        Passed - Blood pressure under 140/90 in past 12 months     BP Readings from Last 3 Encounters:   05/23/23 124/83   02/09/23 107/71   09/14/22 120/82                 Passed - Recent (12 mo) or future (30 days) visit within the authorizing provider's specialty     Patient has had an office visit with the authorizing provider or a provider within the authorizing providers department within the previous 12 mos or has a future within next 30 days. See \"Patient Info\" tab in inbasket, or \"Choose Columns\" in Meds & Orders section of the refill encounter.              Passed - Medication is active on med list        Passed - Patient is age 18 or older        Passed - Normal serum creatinine on file in past 12 months     Recent Labs   Lab Test 09/05/23  1033   CR 1.10       Ok to refill medication if creatinine is low          Passed - Normal serum potassium on file in past 12 months     Recent Labs   Lab Test 09/05/23  1033   POTASSIUM 4.9                  "

## 2023-12-18 ENCOUNTER — OFFICE VISIT (OUTPATIENT)
Dept: ENDOCRINOLOGY | Facility: CLINIC | Age: 43
End: 2023-12-18
Payer: COMMERCIAL

## 2023-12-18 VITALS
BODY MASS INDEX: 29.56 KG/M2 | DIASTOLIC BLOOD PRESSURE: 84 MMHG | SYSTOLIC BLOOD PRESSURE: 130 MMHG | HEART RATE: 66 BPM | WEIGHT: 200.2 LBS

## 2023-12-18 DIAGNOSIS — Z96.41 INSULIN PUMP STATUS: ICD-10-CM

## 2023-12-18 DIAGNOSIS — E10.3299 TYPE 1 DIABETES MELLITUS WITH BACKGROUND RETINOPATHY (H): Primary | ICD-10-CM

## 2023-12-18 PROCEDURE — 95251 CONT GLUC MNTR ANALYSIS I&R: CPT | Performed by: INTERNAL MEDICINE

## 2023-12-18 PROCEDURE — 99214 OFFICE O/P EST MOD 30 MIN: CPT | Performed by: INTERNAL MEDICINE

## 2023-12-18 NOTE — Clinical Note
12/18/2023         RE: Mateo Rodriguez  [address] [facility] [address]        Dear Colleague,    Thank you for referring your patient, Mateo Rodriguez, to the [facility] [place]. Please see a copy of my visit note below.      Recent issues:  Diabetes follow-up evaluation  Upgraded pump to Medtronic 780G pump with Guardian 4 sensor ~8/2/23  He perceived tighter glucose control on new pump system  Traveling [place] [facility] [place] soon, also planning ski trip to [place] in 2/2024 1990. Diagnosis of diabetes, age 10, living in [place]  Began insulin treatment with insulin injections, recalls taking NPH and Regular insulins  Treatment with multiple daily injection (MDI) plan  Has seen me for diabetes evaluations at my former clinic ([facility])  2008. Switched to Medtronic insulin pump  Subsequent upgrades to other Medtronic pumps  Had used Enlite CGMS sensor  8/2017. Upgraded to Medtronic 670G pump with Guardian3 sensor    Spring 2021. Upgraded to Medtronic 770G pump  8/2023. Upgraded to Medtronic 780G pump/Guardian4 CGM              Novolog in pump     Uses [employer] Contour Link meter, tests 3-4x/day  Perceives good hypoglycemia awareness symptoms  Current Medtronic 780G pump settings:       Recent pump Carelink report:           Recent [facility] labs include:  Lab Results   Component Value Date    A1C 7.9 (H) 09/05/2023     09/05/2023    POTASSIUM 4.9 09/05/2023    CHLORIDE 102 09/05/2023    CO2 24 09/05/2023    ANIONGAP 12 09/05/2023     (H) 09/05/2023    BUN 23.2 (H) 09/05/2023    CR 1.10 09/05/2023    GFRESTIMATED 84 09/05/2023    GFRESTBLACK 84 03/18/2021    WILBERTO 9.3 09/05/2023    CHOL 181 09/05/2023    TRIG 45 09/05/2023    HDL 65 09/05/2023     (H) 09/05/2023    NHDL 116 09/05/2023    UCRR 50.8 02/15/2023    MICROL <12.0 02/15/2023    UMALCR  02/15/2023      Comment:      Unable to calculate, urine albumin and/or urine creatinine is outside detectable  limits.  Microalbuminuria is defined as an albumin:creatinine ratio of 17 to 299 for males and 25 to 299 for females. A ratio of albumin:creatinine of 300 or higher is indicative of overt proteinuria.  Due to biologic variability, positive results should be confirmed by a second, first-morning random or 24-hour timed urine specimen. If there is discrepancy, a third specimen is recommended. When 2 out of 3 results are in the microalbuminuria range, this is evidence for incipient nephropathy and warrants increased efforts at glucose control, blood pressure control, and institution of therapy with an angiotensin-converting-enzyme (ACE) inhibitor (if the patient can tolerate it).       Lab Results   Component Value Date    TSH 1.68 09/05/2023     Goes to [facility], Dr. Armenta, last eye exam 11/2023   DM Complications:               Retinopathy                          Mild NPDR        , 2 children, lives in [place].   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 Kevyn Mcintyre/ASHLEY [place]      PMH/PSH:  Past Medical History:   Diagnosis Date    Cervical radiculopathy     finger tingling    Insulin pump status     Left knee injury     MCL, ACL injury    Type 1 diabetes (H)      No past surgical history on file.    Family Hx:  Family History   Problem Relation Age of Onset    Other Cancer Father     Heart Failure Maternal Grandfather          Social Hx:  Social History     Socioeconomic History    Marital status:      Spouse name: Not on file    Number of children: Not on file    Years of education: Not on file    Highest education level: Not on file   Occupational History    Not on file   Tobacco Use    Smoking status: Never    Smokeless tobacco: Never   Substance and Sexual Activity    Alcohol use: Yes    Drug use: No    Sexual activity: Not on file   Other Topics Concern    Parent/sibling w/ CABG, MI or angioplasty before 65F 55M? Not  Asked   Social History Narrative    Not on file     Social Determinants of Health     Financial Resource Strain: Not on file   Food Insecurity: Not on file   Transportation Needs: Not on file   Physical Activity: Not on file   Stress: Not on file   Social Connections: Not on file   Interpersonal Safety: Not on file   Housing Stability: Not on file          MEDICATIONS:  has a current medication list which includes the following prescription(s): atorvastatin, tresiba flextouch, insulin lispro, insulin pump, lisinopril, acetone urine, contour next test, blood glucose, baqsimi two pack, insulin aspart, insulin pen needle, and insulin syringe-needle u-100.       ROS: 10 point ROS neg other than the symptoms noted above in the HPI.     GENERAL: energy good, ; denies fevers, chills, malaise, night sweats.   HEENT: no dysphagia, odonophagia, diplopia, neck pain or tenderness  THYROID:  no apparent hyper or hypothyroid symptoms  CV: no chest pain, pressure, palpitations, skipped beats  LUNGS: no SOB, VÁZQUEZ, cough, sputum production, wheezing   ABDOMEN: no diarrhea, constipation, abdominal pain  EXTREMITIES: no apparent rashes, ulcers, edema  NEUROLOGY: no headaches, denies changes in vision, tingling, extremitiy numbness   MSK: some left knee pains; no other muscle aches or pains, weakness  SKIN: increased sweating; no rashes or lesions  PSYCH:  stable mood, no significant anxiety or depression  ENDOCRINE: no heat or cold intolerance      Physical Exam   VS: /84   Pulse 66   Wt 90.8 kg (200 lb 3.2 oz)   BMI 29.56 kg/m    GENERAL: AXOX3, NAD, well dressed, answering questions appropriately, appears stated age.  ENT: no nose swelling or nasal discharge, mouth redness or gum changes.  EYES: eyes grossly normal to inspection, conjunctivae and sclerae normal, no exophthalmos or proptosis  THYROID:  no apparent nodules or goiter  LUNGS: no audible wheeze, cough or visible cyanosis, or increased work of breathing  ABDOMEN:  abdomen size  EXTREMITIES: no edema noted  NEUROLOGY: CN grossly intact, no tremors  MSK: grossly intact  SKIN:  no apparent skin lesions, rash, or edema with visualized skin appearance  PSYCH: mentation appears normal, affect normal/bright, judgement and insight intact,   normal speech and appearance well groomed      LABS:     All pertinent notes, labs, and images personally reviewed by me.        A/P:  Encounter Diagnoses   Name Primary?    Type 1 diabetes mellitus with background retinopathy (H) Yes    Insulin pump status        Comments:  Reviewed health history and diabetes issues  Need to access [employer] Carelink data at diabetes appointments  Reviewed and interpreted tests that I previously ordered.   Ordered appropriate tests for the endocrinology disease management.    Management options discussed and implemented after shared medical decision making with the patient.  T1DM problem is chronic-stable    Plan:  Reviewed the overall T1DM management and insulin pump use.  Discussed optimal BG testing to assess glucose trends.  We reviewed insulin pump settings, basal rate and bolus dosing  Use of automated pump bolus dosing for meal/snack carb & correction dosing  Reviewed benefit of accessing the Carelink pump and Guardian4 glucose sensor trend data, in detail     Recommend:  Continue current Medtronic 780G pump with Guardian4 CGM sensor, diabetes management plan  Pump setting changes:   Bolus ICR 11a 5.0 to 4.8, 5p 4.3 to 4.1    Plan to see one of our [facility] Diab Educators   Review recent 780G/[place]4 data   Assist with his pump data upload process... since unable to do this today  Continue use of the insulin pump auto-mode regularly  Use the Temp Target setting for aerobic exercise    Pens for [place]      Plan fasting lab testing in 12/2023   Testing at [facility] clinic   Lab orders placed  Reminded him to take extra diabetes supplies (Nv vial, Tresiba pen, syringes, pen needles) on vacations  Change statin  meds from simvastatin 40 mg to atorvastatin 20 mg daily, new Rx sent to pharmacy  Continue lisinopril daily dose plan  Overdue for eye exam with Dr. Armenta, needs to schedule  Arrange annual dilated eye exam, fasting lipid panel testing.  Needs follow-up endocrinology appointments every -4 months     See PCP and/or orthopedic physician for left knee problem  Addressed patient's questions today.    There are no Patient Instructions on file for this visit.    Future labs ordered today:   Orders Placed This Encounter   Procedures    GLUCOSE MONITOR, 72 HOUR, PHYS INTERP    Hemoglobin A1c    Basic metabolic panel    ALT     Radiology/Consults ordered today: None    Total time spent on day of encounter:  ***    Follow-up:  3/25/24 at 11:30 am, Return    ELIZABETH Schneider MD, MS  Endocrinology  [facility]                              Recent issues:  Diabetes follow-up evaluation  Upgraded pump to Medtronic 780G pump with Guardian 4 sensor ~8/2/23  He perceived tighter glucose control on new pump system  Traveling to [place] ski trip soon, also planning ski trip to [facility] in 2/2024 1990. Diagnosis of diabetes, age 10, living in [place]  Began insulin treatment with insulin injections, recalls taking NPH and Regular insulins  Treatment with multiple daily injection (MDI) plan  Has seen me for diabetes evaluations at my former clinic ([facility])  2008. Switched to Medtronic insulin pump  Subsequent upgrades to other Medtronic pumps  Had used Enlite CGMS sensor  8/2017. Upgraded to Medtronic 670G pump with Guardian3 sensor    Spring 2021. Upgraded to Medtronic 770G pump  8/2023. Upgraded to Medtronic 780G pump/Guardian4 CGM              Novolog in pump     Uses [employer] Contour Link meter, tests 3-4x/day  Perceives good hypoglycemia awareness symptoms  Current Medtronic 780G pump settings:       Recent pump Carelink report:               Recent [facility] labs include:  Lab Results   Component Value Date    A1C 7.9 (H)  09/05/2023     09/05/2023    POTASSIUM 4.9 09/05/2023    CHLORIDE 102 09/05/2023    CO2 24 09/05/2023    ANIONGAP 12 09/05/2023     (H) 09/05/2023    BUN 23.2 (H) 09/05/2023    CR 1.10 09/05/2023    GFRESTIMATED 84 09/05/2023    GFRESTBLACK 84 03/18/2021    WILBERTO 9.3 09/05/2023    CHOL 181 09/05/2023    TRIG 45 09/05/2023    HDL 65 09/05/2023     (H) 09/05/2023    NHDL 116 09/05/2023    UCRR 50.8 02/15/2023    MICROL <12.0 02/15/2023    UMALCR  02/15/2023      Comment:      Unable to calculate, urine albumin and/or urine creatinine is outside detectable limits.  Microalbuminuria is defined as an albumin:creatinine ratio of 17 to 299 for males and 25 to 299 for females. A ratio of albumin:creatinine of 300 or higher is indicative of overt proteinuria.  Due to biologic variability, positive results should be confirmed by a second, first-morning random or 24-hour timed urine specimen. If there is discrepancy, a third specimen is recommended. When 2 out of 3 results are in the microalbuminuria range, this is evidence for incipient nephropathy and warrants increased efforts at glucose control, blood pressure control, and institution of therapy with an angiotensin-converting-enzyme (ACE) inhibitor (if the patient can tolerate it).       Lab Results   Component Value Date    TSH 1.68 09/05/2023     Goes to [facility], Dr. Armenta, last eye exam 11/2023   DM Complications:               Retinopathy                          Mild NPDR        , 2 children, lives in [place].   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 Kevyn Mcintyre/ASHLEY [place]      PMH/PSH:  Past Medical History:   Diagnosis Date     Cervical radiculopathy     finger tingling     Insulin pump status      Left knee injury     MCL, ACL injury     Type 1 diabetes (H)      No past surgical history on file.    Family Hx:  Family History   Problem Relation Age of Onset      Other Cancer Father      Heart Failure Maternal Grandfather          Social Hx:  Social History     Socioeconomic History     Marital status:      Spouse name: Not on file     Number of children: Not on file     Years of education: Not on file     Highest education level: Not on file   Occupational History     Not on file   Tobacco Use     Smoking status: Never     Smokeless tobacco: Never   Substance and Sexual Activity     Alcohol use: Yes     Drug use: No     Sexual activity: Not on file   Other Topics Concern     Parent/sibling w/ CABG, MI or angioplasty before 65F 55M? Not Asked   Social History Narrative     Not on file     Social Determinants of Health     Financial Resource Strain: Not on file   Food Insecurity: Not on file   Transportation Needs: Not on file   Physical Activity: Not on file   Stress: Not on file   Social Connections: Not on file   Interpersonal Safety: Not on file   Housing Stability: Not on file          MEDICATIONS:  has a current medication list which includes the following prescription(s): atorvastatin, tresiba flextouch, insulin lispro, insulin pump, lisinopril, acetone urine, contour next test, blood glucose, baqsimi two pack, insulin aspart, insulin pen needle, and insulin syringe-needle u-100.       ROS: 10 point ROS neg other than the symptoms noted above in the HPI.     GENERAL: energy good, ; denies fevers, chills, malaise, night sweats.   HEENT: no dysphagia, odonophagia, diplopia, neck pain or tenderness  THYROID:  no apparent hyper or hypothyroid symptoms  CV: no chest pain, pressure, palpitations, skipped beats  LUNGS: no SOB, VÁZQUEZ, cough, sputum production, wheezing   ABDOMEN: no diarrhea, constipation, abdominal pain  EXTREMITIES: no apparent rashes, ulcers, edema  NEUROLOGY: no headaches, denies changes in vision, tingling, extremitiy numbness   MSK: some left knee pains; no other muscle aches or pains, weakness  SKIN: increased sweating; no rashes or lesions  PSYCH:   stable mood, no significant anxiety or depression  ENDOCRINE: no heat or cold intolerance      Physical Exam   VS: /84   Pulse 66   Wt 90.8 kg (200 lb 3.2 oz)   BMI 29.56 kg/m    GENERAL: AXOX3, NAD, well dressed, answering questions appropriately, appears stated age.  ENT: no nose swelling or nasal discharge, mouth redness or gum changes.  EYES: eyes grossly normal to inspection, conjunctivae and sclerae normal, no exophthalmos or proptosis  THYROID:  no apparent nodules or goiter  LUNGS: no audible wheeze, cough or visible cyanosis, or increased work of breathing  ABDOMEN: abdomen mildly obese size, nontender  EXTREMITIES: normal appearance of feet, pulses R/L DP 4/4; no edema noted  NEUROLOGY: CN grossly intact, no tremors  MSK: grossly intact  SKIN:  no apparent skin lesions, rash, or edema with visualized skin appearance  PSYCH: mentation appears normal, affect normal/bright, judgement and insight intact,   normal speech and appearance well groomed      LABS:     All pertinent notes, labs, and images personally reviewed by me.        A/P:  Encounter Diagnoses   Name Primary?     Type 1 diabetes mellitus with background retinopathy (H) Yes     Insulin pump status        Comments:  Reviewed health history and diabetes issues  Recent CGM trends improved with the new Medtronic pump and sensor use  Reviewed and interpreted tests that I previously ordered.   Ordered appropriate tests for the endocrinology disease management.    Management options discussed and implemented after shared medical decision making with the patient.  T1DM problem is chronic-stable    Plan:  Reviewed the overall T1DM management and insulin pump use.  Discussed optimal BG testing to assess glucose trends.  We reviewed insulin pump settings, basal rate and bolus dosing  Use of automated pump bolus dosing for meal/snack carb & correction dosing  Reviewed the recent Carelink pump and Guardian4 glucose sensor trend data, in detail      Recommend:  Continue current Medtronic 780G pump with Guardian4 CGM sensor, diabetes management plan  Pump setting changes:   Bolus ICR 11a 5.0 to 4.8, 5p 4.3 to 4.1    Continue use of the insulin pump auto-mode regularly  Use the Temp Target setting for aerobic exercise, including skiing trips  Reminded him to take extra pump supplies on trips, including rapid acting and longacting pens, pen needles   Gave him new Travel Letter for his trip to [facility]  Plan fasting lab testing soon   Testing at [facility] clinic   Lab orders placed  Change statin meds from simvastatin 40 mg to atorvastatin 20 mg daily, new Rx sent to pharmacy  Continue lisinopril daily dose plan  Arrange annual dilated eye exam, fasting lipid panel testing.  Needs follow-up endocrinology appointments every 3-4 months     Addressed patient's questions today.    There are no Patient Instructions on file for this visit.    Future labs ordered today:   Orders Placed This Encounter   Procedures     GLUCOSE MONITOR, 72 HOUR, PHYS INTERP     Hemoglobin A1c     Basic metabolic panel     ALT     Radiology/Consults ordered today: None    Total time spent on day of encounter:  31 min    Follow-up:  3/25/24 at 11:30 am, Return    ELIZABETH Schneider MD, MS  Endocrinology  [facility]                              Again, thank you for allowing me to participate in the care of your patient.        Sincerely,        Onur Schneider MD

## 2023-12-18 NOTE — PROGRESS NOTES
Recent issues:  Diabetes follow-up evaluation  Upgraded pump to Medtronic 780G pump with Guardian 4 sensor ~8/2/23  He perceived tighter glucose control on new pump system  Traveling to [place] ski trip soon, also planning ski trip to [facility] in 2/2024 1990. Diagnosis of diabetes, age 10, living in [place]  Began insulin treatment with insulin injections, recalls taking NPH and Regular insulins  Treatment with multiple daily injection (MDI) plan  Has seen me for diabetes evaluations at my former clinic ([facility])  2008. Switched to Medtronic insulin pump  Subsequent upgrades to other Medtronic pumps  Had used [employer] CGMS sensor  8/2017. Upgraded to Medtronic 670G pump with Guardian3 sensor    Spring 2021. Upgraded to Medtronic 770G pump  8/2023. Upgraded to Medtronic 780G pump/Guardian4 CGM              Novolog in pump     Uses [email or web address] Contour Link meter, tests 3-4x/day  Perceives good hypoglycemia awareness symptoms  Current Medtronic 780G pump settings:       Recent pump Carelink report:               Recent [facility] labs include:  Lab Results   Component Value Date    A1C 7.9 (H) 09/05/2023     09/05/2023    POTASSIUM 4.9 09/05/2023    CHLORIDE 102 09/05/2023    CO2 24 09/05/2023    ANIONGAP 12 09/05/2023     (H) 09/05/2023    BUN 23.2 (H) 09/05/2023    CR 1.10 09/05/2023    GFRESTIMATED 84 09/05/2023    GFRESTBLACK 84 03/18/2021    WILBERTO 9.3 09/05/2023    CHOL 181 09/05/2023    TRIG 45 09/05/2023    HDL 65 09/05/2023     (H) 09/05/2023    NHDL 116 09/05/2023    UCRR 50.8 02/15/2023    MICROL <12.0 02/15/2023    UMALCR  02/15/2023      Comment:      Unable to calculate, urine albumin and/or urine creatinine is outside detectable limits.  Microalbuminuria is defined as an albumin:creatinine ratio of 17 to 299 for males and 25 to 299 for females. A ratio of albumin:creatinine of 300 or higher is indicative of overt proteinuria.  Due to biologic variability, positive results should be confirmed by  a second, first-morning random or 24-hour timed urine specimen. If there is discrepancy, a third specimen is recommended. When 2 out of 3 results are in the microalbuminuria range, this is evidence for incipient nephropathy and warrants increased efforts at glucose control, blood pressure control, and institution of therapy with an angiotensin-converting-enzyme (ACE) inhibitor (if the patient can tolerate it).       Lab Results   Component Value Date    TSH 1.68 09/05/2023     Goes to [facility], Dr. Armenta, last eye exam 11/2023   DM Complications:               Retinopathy                          Mild NPDR        , 2 children, lives in [place].   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 Kevyn Mcintyre/[facility] [place]      PMH/PSH:  Past Medical History:   Diagnosis Date    Cervical radiculopathy     finger tingling    Insulin pump status     Left knee injury     MCL, ACL injury    Type 1 diabetes (H)      No past surgical history on file.    Family Hx:  Family History   Problem Relation Age of Onset    Other Cancer Father     Heart Failure Maternal Grandfather          Social Hx:  Social History     Socioeconomic History    Marital status:      Spouse name: Not on file    Number of children: Not on file    Years of education: Not on file    Highest education level: Not on file   Occupational History    Not on file   Tobacco Use    Smoking status: Never    Smokeless tobacco: Never   Substance and Sexual Activity    Alcohol use: Yes    Drug use: No    Sexual activity: Not on file   Other Topics Concern    Parent/sibling w/ CABG, MI or angioplasty before 65F 55M? Not Asked   Social History Narrative    Not on file     Social Determinants of Health     Financial Resource Strain: Not on file   Food Insecurity: Not on file   Transportation Needs: Not on file   Physical Activity: Not on file   Stress: Not on file   Social Connections: Not on  file   Interpersonal Safety: Not on file   Housing Stability: Not on file          MEDICATIONS:  has a current medication list which includes the following prescription(s): atorvastatin, tresiba flextouch, insulin lispro, insulin pump, lisinopril, acetone urine, contour next test, blood glucose, baqsimi two pack, insulin aspart, insulin pen needle, and insulin syringe-needle u-100.       ROS: 10 point ROS neg other than the symptoms noted above in the HPI.     GENERAL: energy good, ; denies fevers, chills, malaise, night sweats.   HEENT: no dysphagia, odonophagia, diplopia, neck pain or tenderness  THYROID:  no apparent hyper or hypothyroid symptoms  CV: no chest pain, pressure, palpitations, skipped beats  LUNGS: no SOB, VÁZQUEZ, cough, sputum production, wheezing   ABDOMEN: no diarrhea, constipation, abdominal pain  EXTREMITIES: no apparent rashes, ulcers, edema  NEUROLOGY: no headaches, denies changes in vision, tingling, extremitiy numbness   MSK: some left knee pains; no other muscle aches or pains, weakness  SKIN: increased sweating; no rashes or lesions  PSYCH:  stable mood, no significant anxiety or depression  ENDOCRINE: no heat or cold intolerance      Physical Exam   VS: /84   Pulse 66   Wt 90.8 kg (200 lb 3.2 oz)   BMI 29.56 kg/m    GENERAL: AXOX3, NAD, well dressed, answering questions appropriately, appears stated age.  ENT: no nose swelling or nasal discharge, mouth redness or gum changes.  EYES: eyes grossly normal to inspection, conjunctivae and sclerae normal, no exophthalmos or proptosis  THYROID:  no apparent nodules or goiter  LUNGS: no audible wheeze, cough or visible cyanosis, or increased work of breathing  ABDOMEN: abdomen mildly obese size, nontender  EXTREMITIES: normal appearance of feet, pulses R/L DP 4/4; no edema noted  NEUROLOGY: CN grossly intact, no tremors  MSK: grossly intact  SKIN:  no apparent skin lesions, rash, or edema with visualized skin appearance  PSYCH: mentation  appears normal, affect normal/bright, judgement and insight intact,   normal speech and appearance well groomed      LABS:     All pertinent notes, labs, and images personally reviewed by me.        A/P:  Encounter Diagnoses   Name Primary?    Type 1 diabetes mellitus with background retinopathy (H) Yes    Insulin pump status        Comments:  Reviewed health history and diabetes issues  Recent CGM trends improved with the new Medtronic pump and sensor use  Reviewed and interpreted tests that I previously ordered.   Ordered appropriate tests for the endocrinology disease management.    Management options discussed and implemented after shared medical decision making with the patient.  T1DM problem is chronic-stable    Plan:  Reviewed the overall T1DM management and insulin pump use.  Discussed optimal BG testing to assess glucose trends.  We reviewed insulin pump settings, basal rate and bolus dosing  Use of automated pump bolus dosing for meal/snack carb & correction dosing  Reviewed the recent Carelink pump and Guardian4 glucose sensor trend data, in detail     Recommend:  Continue current Medtronic 780G pump with Guardian4 CGM sensor, diabetes management plan  Pump setting changes:   Bolus ICR 11a 5.0 to 4.8, 5p 4.3 to 4.1    Continue use of the insulin pump auto-mode regularly  Use the Temp Target setting for aerobic exercise, including skiing trips  Reminded him to take extra pump supplies on trips, including rapid acting and longacting pens, pen needles   Gave him new Travel Letter for his trip to [facility]   Take carb snacks to be available for aerobic skiing activities also  Plan fasting lab testing soon   Testing at [facility]   Lab orders placed  Change statin meds from simvastatin 40 mg to atorvastatin 20 mg daily, new Rx sent to pharmacy  Continue lisinopril daily dose plan  Arrange annual dilated eye exam, fasting lipid panel testing.  Needs follow-up endocrinology appointments every 3-4 months      Addressed patient's questions today.    There are no Patient Instructions on file for this visit.    Future labs ordered today:   Orders Placed This Encounter   Procedures    GLUCOSE MONITOR, 72 HOUR, PHYS INTERP    Hemoglobin A1c    Basic metabolic panel    ALT     Radiology/Consults ordered today: None    Total time spent on day of encounter:  31 min    Follow-up:  3/25/24 at 11:30 am, Return    ELIZABETH Schneider MD, MS  Endocrinology  [facility]

## 2023-12-20 DIAGNOSIS — E10.3299 TYPE 1 DIABETES MELLITUS WITH BACKGROUND RETINOPATHY (H): ICD-10-CM

## 2023-12-20 RX ORDER — ATORVASTATIN CALCIUM 20 MG/1
20 TABLET, FILM COATED ORAL DAILY
Qty: 90 TABLET | Refills: 1 | Status: SHIPPED | OUTPATIENT
Start: 2023-12-20 | End: 2024-07-23

## 2023-12-20 NOTE — TELEPHONE ENCOUNTER
Advance refill approval request.  Requested Prescriptions   Pending Prescriptions Disp Refills    atorvastatin (LIPITOR) 20 MG tablet 90 tablet 1     Sig: Take 1 tablet (20 mg) by mouth daily       There is no refill protocol information for this order        Last Written Prescription Date:  09/06/2023  Last Fill Quantity: 90 tablet,  # refills: 1   Last office visit: 12/18/2023 ; last virtual visit: 9/6/2023 with prescribing provider:  Onur Schneider MD  Future Office Visit:  03/25/2024

## 2023-12-20 NOTE — TELEPHONE ENCOUNTER
Requested Prescriptions   Pending Prescriptions Disp Refills    atorvastatin (LIPITOR) 20 MG tablet 90 tablet 1     Sig: Take 1 tablet (20 mg) by mouth daily       Statins Protocol Passed - 12/20/2023  8:32 AM        Passed - LDL on file in past 12 months     Recent Labs   Lab Test 09/05/23  1033   *             Passed - No abnormal creatine kinase in past 12 months     No lab results found.             Passed - Recent (12 mo) or future (30 days) visit within the authorizing provider's specialty     The patient must have completed an in-person or virtual visit within the past 12 months or has a future visit scheduled within the next 90 days with the authorizing provider s specialty.  Urgent care and e-visits do not quality as an office visit for this protocol.          Passed - Medication is active on med list        Passed - Patient is age 18 or older           Refills sent  Aaliyah López RN

## 2023-12-21 ENCOUNTER — LAB (OUTPATIENT)
Dept: LAB | Facility: CLINIC | Age: 43
End: 2023-12-21
Payer: COMMERCIAL

## 2023-12-21 DIAGNOSIS — Z96.41 INSULIN PUMP STATUS: ICD-10-CM

## 2023-12-21 DIAGNOSIS — E10.3299 TYPE 1 DIABETES MELLITUS WITH BACKGROUND RETINOPATHY (H): ICD-10-CM

## 2023-12-21 LAB
ALT SERPL W P-5'-P-CCNC: 29 U/L (ref 0–70)
ANION GAP SERPL CALCULATED.3IONS-SCNC: 9 MMOL/L (ref 7–15)
BUN SERPL-MCNC: 16.8 MG/DL (ref 6–20)
CALCIUM SERPL-MCNC: 9.6 MG/DL (ref 8.6–10)
CHLORIDE SERPL-SCNC: 100 MMOL/L (ref 98–107)
CHOLEST SERPL-MCNC: 184 MG/DL
CREAT SERPL-MCNC: 1.1 MG/DL (ref 0.67–1.17)
DEPRECATED HCO3 PLAS-SCNC: 29 MMOL/L (ref 22–29)
EGFRCR SERPLBLD CKD-EPI 2021: 84 ML/MIN/1.73M2
FASTING STATUS PATIENT QL REPORTED: NO
GLUCOSE SERPL-MCNC: 94 MG/DL (ref 70–99)
HBA1C MFR BLD: 7.6 % (ref 0–5.6)
HDLC SERPL-MCNC: 58 MG/DL
LDLC SERPL CALC-MCNC: 112 MG/DL
NONHDLC SERPL-MCNC: 126 MG/DL
POTASSIUM SERPL-SCNC: 4.5 MMOL/L (ref 3.4–5.3)
SODIUM SERPL-SCNC: 138 MMOL/L (ref 135–145)
TRIGL SERPL-MCNC: 68 MG/DL

## 2023-12-21 PROCEDURE — 80061 LIPID PANEL: CPT

## 2023-12-21 PROCEDURE — 84460 ALANINE AMINO (ALT) (SGPT): CPT

## 2023-12-21 PROCEDURE — 80048 BASIC METABOLIC PNL TOTAL CA: CPT

## 2023-12-21 PROCEDURE — 83036 HEMOGLOBIN GLYCOSYLATED A1C: CPT

## 2023-12-21 PROCEDURE — 36415 COLL VENOUS BLD VENIPUNCTURE: CPT

## 2024-01-08 ENCOUNTER — MYC MEDICAL ADVICE (OUTPATIENT)
Dept: ENDOCRINOLOGY | Facility: CLINIC | Age: 44
End: 2024-01-08
Payer: COMMERCIAL

## 2024-02-02 NOTE — PROGRESS NOTES
Name: Mateo Rodriguez      HPI:  Recent issues:  Here for f/u diabetes evaluation  Feeling well overall, no recent health issues reported        1990. Diagnosis of diabetes, age 10, living in [place]  Began insulin treatment with insulin injections, recalls taking NPH and Regular insulins  Treatment with multiple daily injection (MDI) plan  Has seen me for diabetes evaluations at my former clinic ([facility])  2008. Switched to Medtronic insulin pump  Subsequent upgrades to other Medtronic pumps  Had used [employer] CGMS sensor  8/2017. Upgraded to Medtronic 670G pump with Guardian3 sensor   Novolog in pump    Uses [employer] Contour Link meter, tests 3-4x/day  Perceives good hypoglycemia awareness symptoms  Current pump settings:        Recent pump Carelink report:            Recent [facility] labs include:    Lab Results   Component Value Date    A1C 7.6 (H) 10/29/2019     02/05/2019    POTASSIUM 4.3 02/05/2019    CHLORIDE 100 02/05/2019    CO2 33 (H) 02/05/2019    ANIONGAP 1 (L) 02/05/2019     (H) 02/05/2019    BUN 20 02/05/2019    CR 1.10 02/05/2019    GFRESTIMATED 83 02/05/2019    GFRESTBLACK >90 02/05/2019    WILBERTO 9.3 02/05/2019    UCRR 215 02/06/2018    MICROL 11 02/06/2018    UMALCR 5.30 02/06/2018     Lab Results   Component Value Date    TSH 1.74 02/05/2019     Goes to [facility], Dr. Armenta, BDR noted 4/2019  DM Complications:    Retinopathy    BDR noted 3/2018      , 2 children, lives in [place].   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    Family Hx:  Family History   Problem Relation Age of Onset     Other Cancer Father      Heart Failure Maternal Grandfather          Social Hx:  Social History     Socioeconomic History     Marital status: Single     Spouse name: Not on file     Number of children: Not on file      Years of education: Not on file     Highest education level: Not on file   Occupational History     Not on file   Social Needs     Financial resource strain: Not on file     Food insecurity:     Worry: Not on file     Inability: Not on file     Transportation needs:     Medical: Not on file     Non-medical: Not on file   Tobacco Use     Smoking status: Never Smoker     Smokeless tobacco: Never Used   Substance and Sexual Activity     Alcohol use: Yes     Drug use: No     Sexual activity: Not on file   Lifestyle     Physical activity:     Days per week: Not on file     Minutes per session: Not on file     Stress: Not on file   Relationships     Social connections:     Talks on phone: Not on file     Gets together: Not on file     Attends Baptism service: Not on file     Active member of club or organization: Not on file     Attends meetings of clubs or organizations: Not on file     Relationship status: Not on file     Intimate partner violence:     Fear of current or ex partner: Not on file     Emotionally abused: Not on file     Physically abused: Not on file     Forced sexual activity: Not on file   Other Topics Concern     Parent/sibling w/ CABG, MI or angioplasty before 65F 55M? Not Asked   Social History Narrative     Not on file          MEDICATIONS:  has a current medication list which includes the following prescription(s): insulin pump, lisinopril, simvastatin, acetone urine, contour next test, insulin degludec, insulin pen needle, insulin syringe-needle u-100, and novolog vial.    ROS:     ROS: 10 point ROS neg other than the symptoms noted above in the HPI.    GENERAL: energy good, wt stable; denies fevers, chills, malaise, night sweats.   HEENT: no dysphagia, odonophagia, diplopia, neck pain or tenderness  THYROID:  no apparent hyper or hypothyroid symptoms  CV: no chest pain, pressure, palpitations, skipped beats  LUNGS: no SOB, VÁZQUEZ, cough, sputum production, wheezing   ABDOMEN: no diarrhea,  "constipation, abdominal pain  EXTREMITIES: no apparent rashes, ulcers, edema  NEUROLOGY: no headaches, denies changes in vision, tingling, extremitiy numbness   MSK: occasional ankle (achilles?) pains; no other muscle aches or pains, weakness  SKIN: increased sweating; no rashes or lesions  PSYCH:  stable mood, no significant anxiety or depression  ENDOCRINE: no heat or cold intolerance    Physical Exam   VS: /70   Pulse 70   Ht 1.753 m (5' 9\")   Wt 90.8 kg (200 lb 3.2 oz)   BMI 29.56 kg/m    GENERAL: AXOX3, NAD, well dressed, answering questions appropriately, appears stated age.  THYROID:  normal gland, no apparent nodules or goiter  CV:  RRR without murmurs  LUNGS:  CTA without wheezes or crackles  ABDOMEN:  Mildly obese, soft, nontender, nondistended, no organomegaly  EXTREMITIES: no edema, no lesions  NEUROLOGY: CN grossly intact, normal feet sensation, no tremors  MSK: grossly intact  SKIN:     LABS:    All pertinent notes, labs, and images personally reviewed by me.     A/P:  Encounter Diagnoses   Name Primary?     Type 1 diabetes mellitus with background retinopathy (H) Yes     Insulin pump status        Comments:  Reviewed health history and diabetes issues  Improved glucose control with use of the Medtronic 670G pump and sensor system.    Plan:  Reviewed the overall T1DM management and insulin pump use.  Discussed optimal BG testing to assess glucose trends.  We reviewed insulin pump settings, basal rate and bolus dosing  Use of automated pump bolus dosing for meal/snack carb & correction dosing  Reviewed pump Carelink report and the recent Guardian3 glucose sensor trends, in detail.    Recommend:  Continue current Medtronic pump and CGMS sensor, diabetes management plan  Plan to use insulin pump auto-mode regularly  Pump setting changes:   Bolus: I:C 5p 6.8 to 6.5    Use the Temp Target setting for aerobic exercise  Check fasting labs today  Plan to take extra diabetes supplies (Nv vial, Tresiba " pen, syringes, pen needles) on vacations, as noted.  Reminded him to use urine ketostix if high BG over 400 mg/dl and/or significant nausea, updated Rx sent to pharmacy  Continue lisinopril and simvastatin daily dose plan  Discussed plan for replacement Guardian3 transmitter, discussed with Medtronic rep (LH) today by phone also  Arrange annual dilated eye exam, fasting lipid panel testing.    Addressed patient's questions today.    Labs ordered today:   Orders Placed This Encounter   Procedures     GLUCOSE MONITOR, 72 HOUR, PHYS INTERP     Hemoglobin A1c     Lipid panel reflex to direct LDL Fasting     ALT     Basic metabolic panel     Albumin Random Urine Quantitative with Creat Ratio     Radiology/Consults ordered today: None    More than 50% of the time spent with Mr. Rodriguez on counseling / coordinating his care.  Total appointment time was 30 minutes.    Follow-up:  3 mo or prn    ELIZABETH Schneider MD, MS  Endocrinology  [facility]              9 (severe pain)

## 2024-03-15 DIAGNOSIS — E10.3299 TYPE 1 DIABETES MELLITUS WITH BACKGROUND RETINOPATHY (H): ICD-10-CM

## 2024-03-15 DIAGNOSIS — Z96.41 INSULIN PUMP STATUS: ICD-10-CM

## 2024-03-15 DIAGNOSIS — I10 ESSENTIAL HYPERTENSION: ICD-10-CM

## 2024-03-15 RX ORDER — LISINOPRIL 10 MG/1
10 TABLET ORAL DAILY
Qty: 90 TABLET | Refills: 1 | Status: SHIPPED | OUTPATIENT
Start: 2024-03-15

## 2024-03-15 RX ORDER — INSULIN LISPRO 100 [IU]/ML
INJECTION, SOLUTION INTRAVENOUS; SUBCUTANEOUS
Qty: 30 ML | Refills: 3 | Status: SHIPPED | OUTPATIENT
Start: 2024-03-15 | End: 2024-03-25

## 2024-03-15 NOTE — TELEPHONE ENCOUNTER
Last Written Prescription Date:  11/15/23 and 12/8/23  Last Fill Quantity: 30/3 and 90/1  Last office visit: 9/6/2023 with prescribing provider:  Dr. Schneider   Future Office Visit: 3/25/24         Requested Prescriptions   Pending Prescriptions Disp Refills    insulin lispro (HUMALOG VIAL) 100 UNIT/ML vial [Pharmacy Med Name: INSULIN LISPRO 100U/ML VIAL 10ML] 30 mL 3     Sig: USE WITH INSULIN PUMP, TOTAL DAILY DOSE APPROXIMATE 75 UNITS       Insulin Protocol Failed - 3/15/2024  8:08 AM        Failed - Chart Review Required     Review Chart.    Do not approve if insulin is used in a pump.  Instead, direct refill request to the patient's endocrinologist.  If the patient doesn't have an endocrinologist, then send the refill to the patient's PCP for review            Passed - Medication is active on med list        Passed - Has GFR on file in past 12 months and most recent value is normal        Passed - Recent (6 mo) or future (90 days) visit within the authorizing provider's specialty     The patient must have completed an in-person or virtual visit within the past 6 months or has a future visit scheduled within the next 90 days with the authorizing provider s specialty.  Urgent care and e-visits do not quality as an office visit for this protocol.          Passed - Medication indicated for associated diagnosis     Medication is associated with one or more of the following diagnoses:   - Type 1 diabetes mellitus  - Type 2 diabetes mellitus  - Diabetic nephropathy; Prophylaxis  - Neuropathy due to diabetes mellitus; Prophylaxis  - Retinopathy due to diabetes mellitus; Prophylaxis  - Diabetes mellitus associated with cystic fibrosis  - Disorder of cardiovascular system; Prophylaxis - Type 1 diabetes mellitus   - Disorder of cardiovascular system; Prophylaxis - Type 2 diabetes mellitus            Passed - Patient is 18 years of age or older       Short Acting Insulin Protocol Passed - 3/15/2024  8:08 AM        Passed -  "Serum creatinine on file in past 12 months     Recent Labs   Lab Test 12/21/23  1105   CR 1.10                 Passed - HgbA1C in past 3 or 6 months     If HgbA1C is 8 or greater, it needs to be on file within the past 3 months.  If less than 8, must be on file within the past 6 months.     Recent Labs   Lab Test 12/21/23  1105   A1C 7.6*             Passed - Medication is active on med list        Passed - Patient is age 18 or older        Passed - Recent (6 mo) or future (30 days) visit within the authorizing provider's specialty     Patient had office visit in the last 6 months or has a visit in the next 30 days with authorizing provider or within the authorizing provider's specialty.  See \"Patient Info\" tab in inbasket, or \"Choose Columns\" in Meds & Orders section of the refill encounter.              lisinopril (ZESTRIL) 10 MG tablet [Pharmacy Med Name: LISINOPRIL 10MG TABLETS] 90 tablet 1     Sig: TAKE 1 TABLET(10 MG) BY MOUTH DAILY       ACE Inhibitors (Including Combos) Protocol Passed - 3/15/2024  8:08 AM        Passed - Blood pressure under 140/90 in past 12 months     BP Readings from Last 3 Encounters:   12/18/23 130/84   05/23/23 124/83   02/09/23 107/71                 Passed - Medication is active on med list        Passed - Medication indicated for associated diagnosis     Medication is associated with one or more of the following diagnoses:     Chronic Kidney Disease (CKD)   Coronary Artery Disease (CAD)   Diabetes   Heart Failure (HF)   Hypertension (HTN)   Nephropathy            Passed - Has GFR on file in past 12 months and most recent value is normal        Passed - Recent (12 mo) or future (90 days) visit within the authorizing provider's specialty     The patient must have completed an in-person or virtual visit within the past 12 months or has a future visit scheduled within the next 90 days with the authorizing provider s specialty.  Urgent care and e-visits do not quality as an office visit " for this protocol.          Passed - Patient is age 18 or older        Passed - Normal serum creatinine on file in past 12 months     Recent Labs   Lab Test 12/21/23  1105   CR 1.10                 Passed - Normal serum potassium on file in past 12 months     Recent Labs   Lab Test 12/21/23  1105   POTASSIUM 4.5                Refills sent  Aaliyah López RN

## 2024-03-25 ENCOUNTER — OFFICE VISIT (OUTPATIENT)
Dept: ENDOCRINOLOGY | Facility: CLINIC | Age: 44
End: 2024-03-25
Payer: COMMERCIAL

## 2024-03-25 VITALS
DIASTOLIC BLOOD PRESSURE: 79 MMHG | SYSTOLIC BLOOD PRESSURE: 132 MMHG | HEART RATE: 72 BPM | WEIGHT: 202 LBS | BODY MASS INDEX: 29.83 KG/M2

## 2024-03-25 DIAGNOSIS — Z96.41 INSULIN PUMP STATUS: ICD-10-CM

## 2024-03-25 DIAGNOSIS — E10.3299 TYPE 1 DIABETES MELLITUS WITH BACKGROUND RETINOPATHY (H): Primary | ICD-10-CM

## 2024-03-25 PROCEDURE — 99214 OFFICE O/P EST MOD 30 MIN: CPT | Performed by: INTERNAL MEDICINE

## 2024-03-25 PROCEDURE — G2211 COMPLEX E/M VISIT ADD ON: HCPCS | Performed by: INTERNAL MEDICINE

## 2024-03-25 PROCEDURE — 95251 CONT GLUC MNTR ANALYSIS I&R: CPT | Performed by: INTERNAL MEDICINE

## 2024-03-25 RX ORDER — INSULIN LISPRO 100 [IU]/ML
INJECTION, SOLUTION INTRAVENOUS; SUBCUTANEOUS
Qty: 30 ML | Refills: 11 | Status: SHIPPED | OUTPATIENT
Start: 2024-03-25

## 2024-03-25 NOTE — PROGRESS NOTES
Recent issues:  Diabetes follow-up evaluation  Upgraded pump to Medtronic 780G pump with Guardian 4 sensor ~8/2/23  Winter ski trips to [facility] and then [facility]   Reports wearing pump on [place] trip, used Temp [employer] setting, also juice or pop available  States pump and sensor plan going well         1990. Diagnosis of diabetes, age 10, living in [place]  Began insulin treatment with insulin injections, recalls taking NPH and Regular insulins  Treatment with multiple daily injection (MDI) plan  Has seen me for diabetes evaluations at my former clinic ([facility])  2008. Switched to Medtronic insulin pump  Subsequent upgrades to other Medtronic pumps  Had used [email or web address] CGMS sensor  8/2017. Upgraded to Medtronic 670G pump with Guardian3 sensor    Spring 2021. Upgraded to Medtronic 770G pump  8/2023. Upgraded to Medtronic 780G pump/Guardian4 CGM              Novolog in pump     Uses [employer] Contour Link meter, tests 3-4x/day  Perceives good hypoglycemia awareness symptoms  Current Medtronic 780G pump settings:         Recent pump Carelink report:           Recent [facility] labs include:  Lab Results   Component Value Date    A1C 7.6 (H) 12/21/2023     12/21/2023    POTASSIUM 4.5 12/21/2023    CHLORIDE 100 12/21/2023    CO2 29 12/21/2023    ANIONGAP 9 12/21/2023    GLC 94 12/21/2023    BUN 16.8 12/21/2023    CR 1.10 12/21/2023    GFRESTIMATED 84 12/21/2023    GFRESTBLACK 84 03/18/2021    WILBERTO 9.6 12/21/2023    CHOL 184 12/21/2023    TRIG 68 12/21/2023    HDL 58 12/21/2023     (H) 12/21/2023    NHDL 126 12/21/2023    UCRR 50.8 02/15/2023    MICROL <12.0 02/15/2023    UMALCR  02/15/2023      Comment:      Unable to calculate, urine albumin and/or urine creatinine is outside detectable limits.  Microalbuminuria is defined as an albumin:creatinine ratio of 17 to 299 for males and 25 to 299 for females. A ratio of albumin:creatinine of 300 or higher is indicative of overt proteinuria.  Due to biologic variability, positive  results should be confirmed by a second, first-morning random or 24-hour timed urine specimen. If there is discrepancy, a third specimen is recommended. When 2 out of 3 results are in the microalbuminuria range, this is evidence for incipient nephropathy and warrants increased efforts at glucose control, blood pressure control, and institution of therapy with an angiotensin-converting-enzyme (ACE) inhibitor (if the patient can tolerate it).       Lab Results   Component Value Date    TSH 1.68 09/05/2023     Goes to [facility], Dr. Armenta, last eye exam 11/2023   DM Complications:               Retinopathy                          Mild NPDR        , 2 children, lives in [place].   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 Kevyn Mcintyre/[facility] [place]      PMH/PSH:  Past Medical History:   Diagnosis Date    Cervical radiculopathy     finger tingling    Insulin pump status     Left knee injury     MCL, ACL injury    Type 1 diabetes (H)      No past surgical history on file.    Family Hx:  Family History   Problem Relation Age of Onset    Other Cancer Father     Heart Failure Maternal Grandfather          Social Hx:  Social History     Socioeconomic History    Marital status:      Spouse name: Not on file    Number of children: Not on file    Years of education: Not on file    Highest education level: Not on file   Occupational History    Not on file   Tobacco Use    Smoking status: Never    Smokeless tobacco: Never   Substance and Sexual Activity    Alcohol use: Yes    Drug use: No    Sexual activity: Not on file   Other Topics Concern    Parent/sibling w/ CABG, MI or angioplasty before 65F 55M? Not Asked   Social History Narrative    Not on file     Social Determinants of Health     Financial Resource Strain: Not on file   Food Insecurity: Not on file   Transportation Needs: Not on file   Physical Activity: Not on file   Stress: Not on  file   Social Connections: Not on file   Interpersonal Safety: Not on file   Housing Stability: Not on file          MEDICATIONS:  has a current medication list which includes the following prescription(s): atorvastatin, insulin lispro, lisinopril, acetone urine, contour next test, blood glucose, baqsimi two pack, insulin aspart, tresiba flextouch, insulin pen needle, insulin pump, and insulin syringe-needle u-100.       ROS: 10 point ROS neg other than the symptoms noted above in the HPI.     GENERAL: energy good, ; denies fevers, chills, malaise, night sweats.   HEENT: no dysphagia, odonophagia, diplopia, neck pain or tenderness  THYROID:  no apparent hyper or hypothyroid symptoms  CV: no chest pain, pressure, palpitations, skipped beats  LUNGS: no SOB, VÁZQUEZ, cough, sputum production, wheezing   ABDOMEN: no diarrhea, constipation, abdominal pain  EXTREMITIES: no apparent rashes, ulcers, edema  NEUROLOGY: no headaches, denies changes in vision, tingling, extremitiy numbness   MSK: some left knee pains; no other muscle aches or pains, weakness  SKIN: increased sweating; no rashes or lesions  PSYCH:  stable mood, no significant anxiety or depression  ENDOCRINE: no heat or cold intolerance      Physical Exam   VS: /79   Pulse 72   Wt 91.6 kg (202 lb)   BMI 29.83 kg/m    GENERAL: AXOX3, NAD, well dressed, answering questions appropriately, appears stated age.  ENT: no nose swelling or nasal discharge, mouth redness or gum changes.  EYES: eyes grossly normal to inspection, conjunctivae and sclerae normal, no exophthalmos or proptosis  THYROID:  no apparent nodules or goiter  CV:  RRR without murmurs  LUNGS: no audible wheeze, cough or visible cyanosis, or increased work of breathing  ABDOMEN: abdomen mildly obese size, nontender  EXTREMITIES: no pedal edema noted  NEUROLOGY: CN grossly intact, no tremors  MSK: grossly intact  SKIN:  no apparent skin lesions, rash, or edema with visualized skin appearance  PSYCH:  mentation appears normal, affect normal/bright, judgement and insight intact,   normal speech and appearance well groomed      LABS:     All pertinent notes, labs, and images personally reviewed by me.        A/P:  Encounter Diagnoses   Name Primary?    Type 1 diabetes mellitus with background retinopathy (H) Yes    Insulin pump status      Comments:  Reviewed health history and diabetes issues  Overall glycemic control improved with the new Medtronic pump and sensor use, but recent postprandial hyperglycemia trends  Reviewed and interpreted tests that I previously ordered.   Ordered appropriate tests for the endocrinology disease management.    Management options discussed and implemented after shared medical decision making with the patient.  T1DM problem is chronic-stable    Plan:  Reviewed the overall T1DM management and insulin pump use.  Discussed optimal BG testing to assess glucose trends.  We reviewed insulin pump settings, basal rate and bolus dosing  Use of automated pump bolus dosing for meal/snack carb & correction dosing  Reviewed the recent Carelink pump and Guardian4 glucose sensor trend data, in detail     Recommend:  Continue current Medtronic 780G pump with Guardian4 CGM sensor, diabetes management plan  Pump setting changes:   Bolus ICR 11a 4.8 to 4.6    Continue use of the insulin pump auto-mode regularly  Use the [employer] setting for aerobic exercise, including skiing trips  Reminded him to take extra pump supplies on trips, including rapid acting and longacting pens, pen needles   Take carb snacks to be available for aerobic skiing activities also  Plan fasting lab testing soon   Testing at [facility] clinic   Lab orders placed  Change statin meds from simvastatin 40 mg to atorvastatin 20 mg daily, new Rx sent to pharmacy  Continue lisinopril daily dose plan  Arrange annual dilated eye exam, fasting lipid panel testing.  Needs follow-up endocrinology appointments every 3-4 months     Addressed  patient's questions today.    The longitudinal plan of care for the endocrine problem(s) were addressed during this visit.  Due to added complexity of care,   we will continue to support the patient and the subsequent management of this condition with ongoing continuity of care.    There are no Patient Instructions on file for this visit.    Future labs ordered today:   Orders Placed This Encounter   Procedures    GLUCOSE MONITOR, 72 HOUR, PHYS INTERP    Hemoglobin A1c    Basic metabolic panel    ALT    Albumin Random Urine Quantitative with Creat Ratio     Radiology/Consults ordered today: None    Total time spent on day of encounter:  35 min    Follow-up:  7/2024 VVAm,    10/2024 Return    ELIZABETH Schneider MD, MS  Endocrinology  [facility]

## 2024-03-27 ENCOUNTER — MEDICAL CORRESPONDENCE (OUTPATIENT)
Dept: HEALTH INFORMATION MANAGEMENT | Facility: CLINIC | Age: 44
End: 2024-03-27

## 2024-06-02 ENCOUNTER — HEALTH MAINTENANCE LETTER (OUTPATIENT)
Age: 44
End: 2024-06-02

## 2024-07-09 ENCOUNTER — MEDICAL CORRESPONDENCE (OUTPATIENT)
Dept: HEALTH INFORMATION MANAGEMENT | Facility: CLINIC | Age: 44
End: 2024-07-09

## 2024-07-11 ENCOUNTER — LAB (OUTPATIENT)
Dept: LAB | Facility: CLINIC | Age: 44
End: 2024-07-11
Payer: COMMERCIAL

## 2024-07-11 DIAGNOSIS — Z96.41 INSULIN PUMP STATUS: ICD-10-CM

## 2024-07-11 DIAGNOSIS — E10.3299 TYPE 1 DIABETES MELLITUS WITH BACKGROUND RETINOPATHY (H): ICD-10-CM

## 2024-07-11 LAB
ALT SERPL W P-5'-P-CCNC: 29 U/L (ref 0–70)
ANION GAP SERPL CALCULATED.3IONS-SCNC: 7 MMOL/L (ref 7–15)
BUN SERPL-MCNC: 15.1 MG/DL (ref 6–20)
CALCIUM SERPL-MCNC: 9.2 MG/DL (ref 8.6–10)
CHLORIDE SERPL-SCNC: 105 MMOL/L (ref 98–107)
CREAT SERPL-MCNC: 1.13 MG/DL (ref 0.67–1.17)
CREAT UR-MCNC: 102 MG/DL
DEPRECATED HCO3 PLAS-SCNC: 29 MMOL/L (ref 22–29)
EGFRCR SERPLBLD CKD-EPI 2021: 82 ML/MIN/1.73M2
GLUCOSE SERPL-MCNC: 133 MG/DL (ref 70–99)
HBA1C MFR BLD: 7.2 % (ref 0–5.6)
MICROALBUMIN UR-MCNC: <12 MG/L
MICROALBUMIN/CREAT UR: NORMAL MG/G{CREAT}
POTASSIUM SERPL-SCNC: 4.8 MMOL/L (ref 3.4–5.3)
SODIUM SERPL-SCNC: 141 MMOL/L (ref 135–145)

## 2024-07-11 PROCEDURE — 36415 COLL VENOUS BLD VENIPUNCTURE: CPT

## 2024-07-11 PROCEDURE — 82570 ASSAY OF URINE CREATININE: CPT

## 2024-07-11 PROCEDURE — 82043 UR ALBUMIN QUANTITATIVE: CPT

## 2024-07-11 PROCEDURE — 84460 ALANINE AMINO (ALT) (SGPT): CPT

## 2024-07-11 PROCEDURE — 83036 HEMOGLOBIN GLYCOSYLATED A1C: CPT

## 2024-07-11 PROCEDURE — 80048 BASIC METABOLIC PNL TOTAL CA: CPT

## 2024-07-23 ENCOUNTER — VIRTUAL VISIT (OUTPATIENT)
Dept: ENDOCRINOLOGY | Facility: CLINIC | Age: 44
End: 2024-07-23
Payer: COMMERCIAL

## 2024-07-23 DIAGNOSIS — E10.3299 TYPE 1 DIABETES MELLITUS WITH BACKGROUND RETINOPATHY (H): Primary | ICD-10-CM

## 2024-07-23 DIAGNOSIS — Z96.41 INSULIN PUMP STATUS: ICD-10-CM

## 2024-07-23 PROCEDURE — G2211 COMPLEX E/M VISIT ADD ON: HCPCS | Mod: 95 | Performed by: INTERNAL MEDICINE

## 2024-07-23 PROCEDURE — 95251 CONT GLUC MNTR ANALYSIS I&R: CPT | Performed by: INTERNAL MEDICINE

## 2024-07-23 PROCEDURE — 99214 OFFICE O/P EST MOD 30 MIN: CPT | Mod: 95 | Performed by: INTERNAL MEDICINE

## 2024-07-23 RX ORDER — ATORVASTATIN CALCIUM 20 MG/1
20 TABLET, FILM COATED ORAL DAILY
Qty: 90 TABLET | Refills: 1 | Status: SHIPPED | OUTPATIENT
Start: 2024-07-23

## 2024-07-23 NOTE — PROGRESS NOTES
Virtual Visit Details    Type of service:  Video Visit     Originating Location (pt. Location): Home  Distant Location (provider location):  Off-site  Platform used for Video Visit: Mina        Recent issues:  Diabetes follow-up evaluation  Upgraded pump to Medtronic 780G pump with Guardian 4 sensor ~8/2/23  Recent travel from [facility] to [facility], delays with return flights due to the recent computer outage issue  States pump and sensor plan going well         1990. Diagnosis of diabetes, age 10, living in [place]  Began insulin treatment with insulin injections, recalls taking NPH and Regular insulins  Treatment with multiple daily injection (MDI) plan  Has seen me for diabetes evaluations at my former clinic ([facility])  2008. Switched to Medtronic insulin pump  Subsequent upgrades to other Medtronic pumps  Had used [employer] CGMS sensor  8/2017. Upgraded to Medtronic 670G pump with Guardian3 sensor    Spring 2021. Upgraded to Medtronic 770G pump  8/2023. Upgraded to Medtronic 780G pump/Guardian4 CGM              Novolog in pump     Uses [employer] Contour Link meter, tests 3-4x/day  Perceives good hypoglycemia awareness symptoms  Current Medtronic 780G pump settings:         Recent pump Carelink report:             Previous [facility] hgbA1c trends include:  Lab Results   Component Value Date    A1C 7.2 (H) 07/11/2024    A1C 7.6 (H) 12/21/2023    A1C 7.9 (H) 09/05/2023    A1C 8.4 (H) 02/15/2023    A1C 7.6 (H) 10/11/2022        Recent [facility] labs include:  Lab Results   Component Value Date    A1C 7.2 (H) 07/11/2024     07/11/2024    POTASSIUM 4.8 07/11/2024    CHLORIDE 105 07/11/2024    CO2 29 07/11/2024    ANIONGAP 7 07/11/2024     (H) 07/11/2024    BUN 15.1 07/11/2024    CR 1.13 07/11/2024    GFRESTIMATED 82 07/11/2024    GFRESTBLACK 84 03/18/2021    WILBERTO 9.2 07/11/2024    CHOL 184 12/21/2023    TRIG 68 12/21/2023    HDL 58 12/21/2023     (H) 12/21/2023    NHDL 126 12/21/2023    UCRR 102.0 07/11/2024    MICROL  <12.0 07/11/2024    UMALCR  07/11/2024      Comment:      Unable to calculate, urine albumin and/or urine creatinine is outside detectable limits.  Microalbuminuria is defined as an albumin:creatinine ratio of 17 to 299 for males and 25 to 299 for females. A ratio of albumin:creatinine of 300 or higher is indicative of overt proteinuria.  Due to biologic variability, positive results should be confirmed by a second, first-morning random or 24-hour timed urine specimen. If there is discrepancy, a third specimen is recommended. When 2 out of 3 results are in the microalbuminuria range, this is evidence for incipient nephropathy and warrants increased efforts at glucose control, blood pressure control, and institution of therapy with an angiotensin-converting-enzyme (ACE) inhibitor (if the patient can tolerate it).       Lab Results   Component Value Date    TSH 1.68 09/05/2023     Goes to [facility], Dr. Armenta, last eye exam 11/2023   DM Complications:               Retinopathy                          Mild NPDR        , 2 children, lives in [place].   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 Kevyn Mcintyre/ [facility] [place]    PMH/PSH:  Past Medical History:   Diagnosis Date    Cervical radiculopathy     finger tingling    Insulin pump status     Left knee injury     MCL, ACL injury    Type 1 diabetes (H)      Past Surgical History:   Procedure Laterality Date    IR LUMBAR PUNCTURE  6/16/2021       Family Hx:  Family History   Problem Relation Age of Onset    Other Cancer Father     Heart Failure Maternal Grandfather          Social Hx:  Social History     Socioeconomic History    Marital status:      Spouse name: Not on file    Number of children: Not on file    Years of education: Not on file    Highest education level: Not on file   Occupational History    Not on file   Tobacco Use    Smoking status: Never    Smokeless tobacco: Never    Substance and Sexual Activity    Alcohol use: Yes    Drug use: No    Sexual activity: Not on file   Other Topics Concern    Parent/sibling w/ CABG, MI or angioplasty before 65F 55M? Not Asked   Social History Narrative    Not on file     Social Determinants of Health     Financial Resource Strain: Not on file   Food Insecurity: Not on file   Transportation Needs: Not on file   Physical Activity: Not on file   Stress: Not on file   Social Connections: Not on file   Interpersonal Safety: Not on file   Housing Stability: Not on file          MEDICATIONS:  has a current medication list which includes the following prescription(s): atorvastatin, insulin lispro, lisinopril, acetone urine, contour next test, blood glucose, baqsimi two pack, insulin aspart, tresiba flextouch, insulin pen needle, insulin pump, and insulin syringe-needle u-100.       ROS: 10 point ROS neg other than the symptoms noted above in the HPI.     GENERAL: energy good, ; denies fevers, chills, malaise, night sweats.   HEENT: no dysphagia, odonophagia, diplopia, neck pain or tenderness  THYROID:  no apparent hyper or hypothyroid symptoms  CV: no chest pain, pressure, palpitations, skipped beats  LUNGS: no SOB, VÁZQUEZ, cough, sputum production, wheezing   ABDOMEN: no diarrhea, constipation, abdominal pain  EXTREMITIES: no apparent rashes, ulcers, edema  NEUROLOGY: no headaches, denies changes in vision, tingling, extremitiy numbness   MSK: some left knee pains; no other muscle aches or pains, weakness  SKIN: increased sweating; no rashes or lesions  PSYCH:  stable mood, no significant anxiety or depression  ENDOCRINE: no heat or cold intolerance    Physical Exam (visual exam)  VS:  no vital signs taken for video visit  CONSTITUTIONAL: healthy, alert and NAD, well dressed, answering questions appropriately  ENT: no nose swelling or nasal discharge, mouth redness or gum changes.  EYES: eyes grossly normal to inspection, conjunctivae and sclerae normal, no  exophthalmos or proptosis  THYROID:  no apparent nodules or goiter  LUNGS: no audible wheeze, cough or visible cyanosis, no visible retractions or increased work of breathing  ABDOMEN: abdomen not evaluated  EXTREMITIES: no hand tremors, limited exam  NEUROLOGY: CN grossly intact, mentation intact and speech normal   SKIN:  no apparent skin lesions, rash, or edema with visualized skin appearance  PSYCH: mentation appears normal, affect normal/bright, judgement and insight intact,   normal speech and appearance well groomed      LABS:     All pertinent notes, labs, and images personally reviewed by me.        A/P:  Encounter Diagnoses   Name Primary?    Type 1 diabetes mellitus with background retinopathy (H) Yes    Insulin pump status        Comments:  Reviewed health history and diabetes issues  Overall glycemic control improved with the new Medtronic pump and sensor use, but recent postprandial hyperglycemia trends  Reviewed and interpreted tests that I previously ordered.   Ordered appropriate tests for the endocrinology disease management.    Management options discussed and implemented after shared medical decision making with the patient.  T1DM problem is chronic-stable    Plan:  Reviewed the overall T1DM management and insulin pump use.  Discussed optimal BG testing to assess glucose trends.  We reviewed insulin pump settings, basal rate and bolus dosing  Use of automated pump bolus dosing for meal/snack carb & correction dosing  Reviewed the recent Carelink pump and Guardian4 glucose sensor trend data, in detail     Recommend:  Continue current Medtronic 780G pump with Guardian4 CGM sensor, diabetes management plan  Pump setting changes:   Bolus ICR 11am 4.6 to 4.4    Continue use of the insulin pump auto-mode regularly  Use the [employer] setting for aerobic exercise, including skiing trips  We have discussed vacation travel  Plan to take extra pump supplies on trips, including rapid acting and longacting  pens, pen needles   Take carb snacks to be available for aerobic skiing activities also  Plan fasting lab testing 10/2024   Testing at [facility]   Lab orders placed  Continue current atorvastatin daily dose  Continue lisinopril daily dose plan  Arrange annual dilated eye exam, fasting lipid panel testing.  Needs follow-up endocrinology appointments every 3-4 months     Addressed patient's questions today.    The longitudinal plan of care for the endocrine problem(s) were addressed during this visit.  Due to added complexity of care,   we will continue to support the patient and the subsequent management of this condition with ongoing continuity of care.    There are no Patient Instructions on file for this visit.    Future labs ordered today:   Orders Placed This Encounter   Procedures    GLUCOSE MONITOR, 72 HOUR, PHYS INTERP    Hemoglobin A1c    Basic metabolic panel    ALT     Radiology/Consults ordered today: None    Total time spent on day of encounter:  21 min    Follow-up:  10/21/24 at 8:30 am, Return    ELIZABETH Schneider MD, MS  Endocrinology  [facility]

## 2024-09-17 ENCOUNTER — MEDICAL CORRESPONDENCE (OUTPATIENT)
Dept: HEALTH INFORMATION MANAGEMENT | Facility: CLINIC | Age: 44
End: 2024-09-17

## 2024-10-18 ENCOUNTER — LAB (OUTPATIENT)
Dept: LAB | Facility: CLINIC | Age: 44
End: 2024-10-18
Payer: COMMERCIAL

## 2024-10-18 ENCOUNTER — MYC MEDICAL ADVICE (OUTPATIENT)
Dept: ENDOCRINOLOGY | Facility: CLINIC | Age: 44
End: 2024-10-18

## 2024-10-18 DIAGNOSIS — E10.3299 TYPE 1 DIABETES MELLITUS WITH BACKGROUND RETINOPATHY (H): ICD-10-CM

## 2024-10-18 DIAGNOSIS — Z96.41 INSULIN PUMP STATUS: ICD-10-CM

## 2024-10-18 LAB
ALT SERPL W P-5'-P-CCNC: 30 U/L (ref 0–70)
ANION GAP SERPL CALCULATED.3IONS-SCNC: 11 MMOL/L (ref 7–15)
BUN SERPL-MCNC: 22 MG/DL (ref 6–20)
CALCIUM SERPL-MCNC: 9.7 MG/DL (ref 8.8–10.4)
CHLORIDE SERPL-SCNC: 101 MMOL/L (ref 98–107)
CREAT SERPL-MCNC: 1.1 MG/DL (ref 0.67–1.17)
EGFRCR SERPLBLD CKD-EPI 2021: 84 ML/MIN/1.73M2
EST. AVERAGE GLUCOSE BLD GHB EST-MCNC: 166 MG/DL
GLUCOSE SERPL-MCNC: 123 MG/DL (ref 70–99)
HBA1C MFR BLD: 7.4 % (ref 0–5.6)
HCO3 SERPL-SCNC: 27 MMOL/L (ref 22–29)
POTASSIUM SERPL-SCNC: 4.7 MMOL/L (ref 3.4–5.3)
SODIUM SERPL-SCNC: 139 MMOL/L (ref 135–145)

## 2024-10-18 PROCEDURE — 80048 BASIC METABOLIC PNL TOTAL CA: CPT

## 2024-10-18 PROCEDURE — 83036 HEMOGLOBIN GLYCOSYLATED A1C: CPT

## 2024-10-18 PROCEDURE — 36415 COLL VENOUS BLD VENIPUNCTURE: CPT

## 2024-10-18 PROCEDURE — 84460 ALANINE AMINO (ALT) (SGPT): CPT

## 2024-10-20 NOTE — PROGRESS NOTES
Recent issues:  Diabetes follow-up evaluation  Using the Medtronic 780G pump with Guardian 4 sensor ~8/2/23  States pump and sensor plan going well         1990. Diagnosis of diabetes, age 10, living in [place]  Began insulin treatment with insulin injections, recalls taking NPH and Regular insulins  Treatment with multiple daily injection (MDI) plan  Has seen me for diabetes evaluations at my former clinic ([facility])  2008. Switched to Medtronic insulin pump  Subsequent upgrades to other Medtronic pumps  Had used Enlite CGMS sensor  8/2017. Upgraded to Medtronic 670G pump with Guardian3 sensor    Spring 2021. Upgraded to Medtronic 770G pump  8/2023. Upgraded to Medtronic 780G pump/Guardian4 CGM              Novolog in pump     Uses Getyoo Contour Link meter, tests 3-4x/day  Perceives good hypoglycemia awareness symptoms  Current Medtronic 780G pump settings:       Recent pump Carelink report:           Previous [facility] hgbA1c trends include:  Lab Results   Component Value Date    A1C 7.4 (H) 10/18/2024    A1C 7.2 (H) 07/11/2024    A1C 7.6 (H) 12/21/2023    A1C 7.9 (H) 09/05/2023    A1C 8.4 (H) 02/15/2023        Recent [facility] labs include:  Lab Results   Component Value Date    A1C 7.4 (H) 10/18/2024     10/18/2024    POTASSIUM 4.7 10/18/2024    CHLORIDE 101 10/18/2024    CO2 27 10/18/2024    ANIONGAP 11 10/18/2024     (H) 10/18/2024    BUN 22.0 (H) 10/18/2024    CR 1.10 10/18/2024    GFRESTIMATED 84 10/18/2024    GFRESTBLACK 84 03/18/2021    WILBERTO 9.7 10/18/2024    CHOL 184 12/21/2023    TRIG 68 12/21/2023    HDL 58 12/21/2023     (H) 12/21/2023    NHDL 126 12/21/2023    UCRR 102.0 07/11/2024    MICROL <12.0 07/11/2024    UMALCR  07/11/2024      Comment:      Unable to calculate, urine albumin and/or urine creatinine is outside detectable limits.  Microalbuminuria is defined as an albumin:creatinine ratio of 17 to 299 for males and 25 to 299 for females. A ratio of albumin:creatinine of 300 or higher is indicative  of overt proteinuria.  Due to biologic variability, positive results should be confirmed by a second, first-morning random or 24-hour timed urine specimen. If there is discrepancy, a third specimen is recommended. When 2 out of 3 results are in the microalbuminuria range, this is evidence for incipient nephropathy and warrants increased efforts at glucose control, blood pressure control, and institution of therapy with an angiotensin-converting-enzyme (ACE) inhibitor (if the patient can tolerate it).       Lab Results   Component Value Date    TSH 1.68 09/05/2023     Goes to [facility], Dr. Armenta, last eye exam 11/2023   DM Complications:               Retinopathy                          Mild NPDR        , 2 children, lives in [place].   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 Kevyn Mcintyre/ [facility] [place]    PMH/PSH:  Past Medical History:   Diagnosis Date    Cervical radiculopathy     finger tingling    Insulin pump status     Left knee injury     MCL, ACL injury    Type 1 diabetes (H)      Past Surgical History:   Procedure Laterality Date    IR LUMBAR PUNCTURE  6/16/2021       Family Hx:  Family History   Problem Relation Age of Onset    Other Cancer Father     Heart Failure Maternal Grandfather          Social Hx:  Social History     Socioeconomic History    Marital status:      Spouse name: Not on file    Number of children: Not on file    Years of education: Not on file    Highest education level: Not on file   Occupational History    Not on file   Tobacco Use    Smoking status: Never    Smokeless tobacco: Never   Substance and Sexual Activity    Alcohol use: Yes    Drug use: No    Sexual activity: Not on file   Other Topics Concern    Parent/sibling w/ CABG, MI or angioplasty before 65F 55M? Not Asked   Social History Narrative    Not on file     Social Determinants of Health     Financial Resource Strain: Not on  file   Food Insecurity: Not on file   Transportation Needs: Not on file   Physical Activity: Not on file   Stress: Not on file   Social Connections: Not on file   Interpersonal Safety: Not on file   Housing Stability: Not on file          MEDICATIONS:  has a current medication list which includes the following prescription(s): atorvastatin, insulin lispro, lisinopril, acetone urine, contour next test, blood glucose, baqsimi two pack, insulin aspart, tresiba flextouch, insulin pen needle, insulin pump, and insulin syringe-needle u-100.       ROS: 10 point ROS neg other than the symptoms noted above in the HPI.     GENERAL: energy good, weight gain; denies fevers, chills, malaise, night sweats.   HEENT: no dysphagia, odonophagia, diplopia, neck pain or tenderness  THYROID:  no apparent hyper or hypothyroid symptoms  CV: no chest pain, pressure, palpitations, skipped beats  LUNGS: no SOB, VÁZQUEZ, cough, sputum production, wheezing   ABDOMEN: no diarrhea, constipation, abdominal pain  EXTREMITIES: palpable lump at bottom left foot; no apparent rashes, ulcers, edema  NEUROLOGY: no headaches, denies changes in vision, tingling, extremitiy numbness   MSK: some left knee pains; no other muscle aches or pains, weakness  SKIN: increased sweating?; no rashes or lesions  PSYCH:  stable mood, no significant anxiety or depression  ENDOCRINE: no heat or cold intolerance      Physical Exam   VS: /83   Pulse 65   Wt 92.8 kg (204 lb 8 oz)   BMI 30.20 kg/m    GENERAL: AXOX3, NAD, well dressed, answering questions appropriately, appears stated age.  ENT: no nose swelling or nasal discharge, mouth redness or gum changes.  EYES: eyes grossly normal to inspection, conjunctivae and sclerae normal, no exophthalmos or proptosis  THYROID:  no apparent nodules or goiter  LUNGS: no audible wheeze, cough or visible cyanosis, or increased work of breathing  ABDOMEN: abdomen mildly obese size  EXTREMITIES: left foot with palpable non-tender  1.5-2 cm lump at plantar midfoot, pulses R/L DP 3/3; no edema noted  NEUROLOGY: CN grossly intact, no tremors  MSK: grossly intact  SKIN:  no apparent skin lesions, rash, or edema with visualized skin appearance  PSYCH: mentation appears normal, affect normal/bright, judgement and insight intact,   normal speech and appearance well groomed    LABS:     All pertinent notes, labs, and images personally reviewed by me.        A/P:  Encounter Diagnoses   Name Primary?    Type 1 diabetes mellitus with background retinopathy (H) Yes    Insulin pump status     Localized swelling of left foot      Comments:  Reviewed health history and diabetes issues  Overall glycemic control improved with the new Medtronic pump and sensor use, but some recent postprandial hyperglycemia trends  Left foot lump unusual, cause unclear  Reviewed and interpreted tests that I previously ordered.   Ordered appropriate tests for the endocrinology disease management.    Management options discussed and implemented after shared medical decision making with the patient.  T1DM problem is chronic-stable    Plan:  Reviewed the overall T1DM management and insulin pump use.  Discussed optimal BG testing to assess glucose trends.  We reviewed insulin pump settings, basal rate and bolus dosing  Use of automated pump bolus dosing for meal/snack carb & correction dosing  Reviewed the recent Carelink pump and Guardian4 glucose sensor trend data, in detail     Recommend:  Continue current Medtronic 780G pump with Guardian4 CGM sensor, diabetes management plan  No pump setting changes at this time    Plan to do premeal meal and snack bolusing, avoid postmeal dosing  Continue use of the insulin pump auto-mode regularly  Use the Temp Target setting for aerobic exercise, including skiing trips or pickleball   Discussed idea of placing pump in shorts pocket or tabitha pack with sports  We have discussed vacation travel  Plan to take extra pump supplies on trips,  including rapid acting and longacting pens, pen needles   Take carb snacks to be available for aerobic skiing activities also  Plan fasting lab testing in 1/2025   Testing at [facility] clinic   Lab orders placed  Continue current atorvastatin daily dose  Continue lisinopril daily dose plan  Plan to see podiatrist for left foot lump problem, referral sent  Arrange annual dilated eye exam, fasting lipid panel testing.  Needs follow-up endocrinology appointments every 3-4 months     Addressed patient's questions today.    The longitudinal plan of care for the endocrine problem(s) were addressed during this visit.  Due to added complexity of care,   we will continue to support the patient and the subsequent management of this condition with ongoing continuity of care.    There are no Patient Instructions on file for this visit.    Future labs ordered today:   Orders Placed This Encounter   Procedures    GLUCOSE MONITOR, 72 HOUR, PHYS INTERP    Hemoglobin A1c    Basic metabolic panel    Lipid panel reflex to direct LDL Fasting    TSH with free T4 reflex    Orthopedic  Referral     Radiology/Consults ordered today: ORTHOPAEDIC  REFERRAL    Total time spent on day of encounter:  45 min    Follow-up:  late 1/2025 VVAm,     Early 5/2025 Return    ELIZABETH Schneider MD, MS  Endocrinology  [facility]

## 2024-10-21 ENCOUNTER — OFFICE VISIT (OUTPATIENT)
Dept: ENDOCRINOLOGY | Facility: CLINIC | Age: 44
End: 2024-10-21
Payer: COMMERCIAL

## 2024-10-21 VITALS
BODY MASS INDEX: 30.2 KG/M2 | SYSTOLIC BLOOD PRESSURE: 123 MMHG | DIASTOLIC BLOOD PRESSURE: 83 MMHG | WEIGHT: 204.5 LBS | HEART RATE: 65 BPM

## 2024-10-21 DIAGNOSIS — E10.3299 TYPE 1 DIABETES MELLITUS WITH BACKGROUND RETINOPATHY (H): Primary | ICD-10-CM

## 2024-10-21 DIAGNOSIS — Z96.41 INSULIN PUMP STATUS: ICD-10-CM

## 2024-10-21 DIAGNOSIS — R22.42 LOCALIZED SWELLING OF LEFT FOOT: ICD-10-CM

## 2024-10-21 PROCEDURE — G2211 COMPLEX E/M VISIT ADD ON: HCPCS | Performed by: INTERNAL MEDICINE

## 2024-10-21 PROCEDURE — 99215 OFFICE O/P EST HI 40 MIN: CPT | Performed by: INTERNAL MEDICINE

## 2024-10-21 PROCEDURE — 95251 CONT GLUC MNTR ANALYSIS I&R: CPT | Performed by: INTERNAL MEDICINE

## 2024-10-21 RX ORDER — INSULIN LISPRO 100 [IU]/ML
INJECTION, SOLUTION INTRAVENOUS; SUBCUTANEOUS
Qty: 30 ML | Refills: 11 | Status: SHIPPED | OUTPATIENT
Start: 2024-10-21

## 2024-11-05 ENCOUNTER — OFFICE VISIT (OUTPATIENT)
Dept: PODIATRY | Facility: CLINIC | Age: 44
End: 2024-11-05
Attending: INTERNAL MEDICINE
Payer: COMMERCIAL

## 2024-11-05 VITALS — DIASTOLIC BLOOD PRESSURE: 78 MMHG | BODY MASS INDEX: 30.13 KG/M2 | WEIGHT: 204 LBS | SYSTOLIC BLOOD PRESSURE: 118 MMHG

## 2024-11-05 DIAGNOSIS — R22.42 LOCALIZED SWELLING OF LEFT FOOT: ICD-10-CM

## 2024-11-05 DIAGNOSIS — M72.2 PLANTAR FASCIAL FIBROMATOSIS: Primary | ICD-10-CM

## 2024-11-05 DIAGNOSIS — E10.9 UNCOMPLICATED TYPE 1 DIABETES MELLITUS (H): ICD-10-CM

## 2024-11-05 PROCEDURE — 99243 OFF/OP CNSLTJ NEW/EST LOW 30: CPT | Performed by: PODIATRIST

## 2024-11-05 NOTE — PROGRESS NOTES
PATIENT HISTORY:  Dr. Schneider requested I see this patient for their foot issue.  Mateo Rodriguez is a 44 year old male who presents to clinic for painful swollen lump on the left foot.  Notes it has been going on for a year.  Will be an aching pain.  Worse with pressure.  Denies specific injury.  Wondering what is causing this and what can be done for it.    Review of Systems:  Patient denies fever, chills, rash, wound, stiffness, limping, numbness, weakness, heart burn, blood in stool, chest pain with activity, calf pain when walking, shortness of breath with activity, chronic cough, easy bleeding/bruising, swelling of ankles, excessive thirst, fatigue, depression, anxiety.  .     PAST MEDICAL HISTORY:   Past Medical History:   Diagnosis Date    Cervical radiculopathy     finger tingling    Insulin pump status     Left knee injury     MCL, ACL injury    Type 1 diabetes (H)         PAST SURGICAL HISTORY:   Past Surgical History:   Procedure Laterality Date    IR LUMBAR PUNCTURE  6/16/2021        MEDICATIONS:   Current Outpatient Medications:     acetone urine (KETOSTIX) test strip, Use for urine testing if very high glucose level and/or severe nausea, as directed, Disp: 25 strip, Rfl: 3    atorvastatin (LIPITOR) 20 MG tablet, Take 1 tablet (20 mg) by mouth daily, Disp: 90 tablet, Rfl: 1    blood glucose (CONTOUR NEXT TEST) test strip, USE TO TEST FIVE TIMES DAILY, Disp: 500 strip, Rfl: 1    blood glucose (NO BRAND SPECIFIED) test strip, Use to test blood sugar 4 times daily or as directed, Accu-check Guide strips., Disp: 400 strip, Rfl: 3    Glucagon (BAQSIMI TWO PACK) 3 MG/DOSE POWD, Spray 1 each in nostril once as needed (In the event of low blood sugar), Disp: 1 each, Rfl: 1    insulin degludec (TRESIBA FLEXTOUCH) 100 UNIT/ML pen, Inject 27 units daily as directed, insulin pens as backup for his insulin pump use, Disp: 15 mL, Rfl: 0    insulin lispro (HUMALOG VIAL) 100 UNIT/ML vial, Use with insulin pump, total  "daily dose approx 95U, Disp: 30 mL, Rfl: 11    insulin pen needle (BD ARIADNA U/F) 32G X 4 MM, Use 1 daily as directed.  For emergency back up plan if pump fails., Disp: 30 each, Rfl: 1    INSULIN PUMP - OUTPATIENT, Date last updated:  9/17/18 Medtronic Minimed: Model 670 BASAL RATES and times: 12   AM: 1.25 units/hour   7 AM: 1.50 12 PM: 1.15 3 PM: 1.05 10:30 PM: 1.05 CARB RATIO and times: 12   AM: 7.2 11 AM: 7.2 4 PM: 7.0 Corection Factor and times: 12   AM (midnight): 44 mg/dL 6 AM: 42 9 PM: 44 BLOOD GLUCOSE TARGET and times: 12   AM (midnight): 100 - 130 6 AM  5 -130 Active Insulin Time: 3.0, Disp: , Rfl:     insulin syringe-needle U-100 (BD INSULIN SYRINGE ULTRAFINE) 30G X 1/2\" 0.5 ML, Use one syringe 3 daily or as directed. For back up if insulin pump fails., Disp: 100 each, Rfl: 1    lisinopril (ZESTRIL) 10 MG tablet, TAKE 1 TABLET(10 MG) BY MOUTH DAILY, Disp: 90 tablet, Rfl: 1    insulin aspart (NOVOLOG PEN) 100 UNIT/ML pen, Inject 3-12 units subcutaneous with meals and correction doses as directed, total daily dose approx 25 units. (Patient not taking: Reported on 5/23/2023), Disp: 15 mL, Rfl: 0     ALLERGIES:    Allergies   Allergen Reactions    Codeine      PN: LW Reaction: Nausea    Seasonal Allergies      Pollen        SOCIAL HISTORY:   Social History     Socioeconomic History    Marital status:      Spouse name: Not on file    Number of children: Not on file    Years of education: Not on file    Highest education level: Not on file   Occupational History    Not on file   Tobacco Use    Smoking status: Never    Smokeless tobacco: Never   Substance and Sexual Activity    Alcohol use: Yes    Drug use: No    Sexual activity: Not on file   Other Topics Concern    Parent/sibling w/ CABG, MI or angioplasty before 65F 55M? Not Asked   Social History Narrative    Not on file     Social Drivers of Health     Financial Resource Strain: Not on file   Food Insecurity: Not on file   Transportation " Needs: Not on file   Physical Activity: Not on file   Stress: Not on file   Social Connections: Not on file   Interpersonal Safety: Not on file   Housing Stability: Not on file        FAMILY HISTORY:   Family History   Problem Relation Age of Onset    Other Cancer Father     Heart Failure Maternal Grandfather         EXAM:Vitals: /78   Wt 92.5 kg (204 lb)   BMI 30.13 kg/m    BMI= Body mass index is 30.13 kg/m .    A1C: 7.4 (10/18/2024)    General appearance: Patient is alert and fully cooperative with history & exam.  No sign of distress is noted during the visit.     Psychiatric: Affect is pleasant & appropriate.  Patient appears motivated to improve health.     Respiratory: Breathing is regular & unlabored while sitting.     HEENT: Hearing is intact to spoken word.  Speech is clear.  No gross evidence of visual impairment that would impact ambulation.     Dermatologic: Skin is intact to both lower extremities without significant lesions, rash or abrasion.  No paronychia or evidence of soft tissue infection is noted.     Vascular: DP & PT pulses are intact & regular bilaterally.  No significant edema or varicosities noted.  CFT and skin temperature is normal to both lower extremities.     Neurologic: Lower extremity sensation is intact to light touch.  No evidence of weakness or contracture in the lower extremities.  No evidence of neuropathy.     Musculoskeletal: Patient is ambulatory without assistive device or brace.  Firm mobile nodule to the medial band of the left arch.  This is approximately 1 cm x 1 cm.  Pain on palpation.     ASSESSMENT:    Localized swelling of left foot  Plantar fascial fibromatosis  Uncomplicated type 1 diabetes mellitus (H)     Medical Decision Making/Plan:  Reviewed patient's chart in epic. We also talked about causes of plantar fibromas.  They are fibrous masses in the plantar fascia that are benign.  Discussed treatment such as padding, shoe gear, injections, physical  therapy.  We also discussed that sometimes these require surgical removal.  Normally this is a last resort.  Risks of surgery including high recurrence rate, continued pain, painful scarring.     Is going to monitor it.  Recommend over-the-counter pain cream such as Voltaren gel once to twice a day.  He could cut out an insert to help keep pressure off of it.  If pain gets worse we could try cortisone injection or iontophoresis with physical therapy.    Questions were answered to patient satisfaction and he will call further questions or concerns.    Patient risk factor: Patient is at low risk for infection.        Farhana Magana DPM, Podiatry/Foot and Ankle Surgery

## 2024-11-05 NOTE — PATIENT INSTRUCTIONS
Thank you for choosing [facility] Podiatry / Foot & Ankle Surgery!    DR MARTE'S CLINIC:  [facility]   [address]   [place] [address]  [phone]    (Tues, Wed, Thur am, Fri pm)     [facility]  [address], [place] [address]  [phone]  (Every other Friday morning)    [facility]  [address] [place] [id number] [phone]  (Every other Friday)     TRIAGE LINE: [phone]  APPOINTMENTS: [phone]  RADIOLOGY: [phone]  SET UP SURGERY: [phone]  PHYSICAL THERAPY: [phone]   FAX NUMBER: [phone]  BILLING QUESTIONS: [phone]       Follow up: as needed.         What Is the Plantar Fibroma?       A plantar fibroma is a fibrous knot (nodule) in the arch of the foot. It is embedded within the plantar fascia, a band of tissue that extends from the heel to the toes on the bottom of the foot. A plantar fibroma can develop in one or both feet, is benign (nonmalignant) and usually will not go away or get smaller without treatment. Definitive causes for this condition have not been clearly identified, but there are several options for the treatment of plantar fibroma.    Signs & Symptoms of Plantar Fibroma  The characteristic sign of a plantar fibroma is a noticeable lump in the arch that feels firm to the touch. This mass can remain the same size or get larger over time or additional fibromas may develop.    People who have a plantar fibroma may or may not have pain. When pain occurs, it is often caused by shoes pushing against the lump in the arch, although it can also arise when walking or standing barefoot.    Diagnosis of Plantar Fibroma  To diagnose a plantar fibroma, the foot and ankle surgeon will examine the foot and press on the affected area. Sometimes this can produce pain that extends down to the toes. An MRI or biopsy may be performed to further evaluate the lump and aid in  diagnosis.    Treatment of Plantar Fibroma  Nonsurgical treatment may help relieve the pain of a plantar fibroma, although it will not make the mass disappear. The foot and ankle surgeon may select one or more of the following nonsurgical options:    Steroid injections. Injecting corticosteroid medication into the mass may help shrink it and thereby relieve the pain that occurs when walking. This reduction may only be temporary and the fibroma could slowly return to its original size.  Orthotic devices. If the fibroma is stable, meaning it is not changing in size, custom orthotic devices (shoe inserts) may relieve the pain by distributing the patient s weight away from the fibroma.  Physical therapy. The pain is sometimes treated through physical therapy methods that deliver anti-inflammatory medication into the fibroma without the need for injection.     If the mass increases in size or pain, the patient should be further evaluated.    Surgical treatment to remove the fibroma is considered if the patient continues to experience pain following nonsurgical approaches. Surgical removal of a plantar fibroma may result in a flattening of the arch or development of hammertoes. Orthotic devices may be prescribed to provide support to the foot. Due to the high incidence of recurrence with this condition, continued follow-up with the foot and ankle surgeon is recommended.

## 2024-11-05 NOTE — LETTER
11/5/2024      Mateo Rodriguez  [address]  [facility] [id number]      Dear Colleague,    Thank you for referring your patient, Mateo Rodriguez, to the [facility]. Please see a copy of my visit note below.    PATIENT HISTORY:  Dr. Schneider requested I see this patient for their foot issue.  Mateo Rodriguez is a 44 year old male who presents to clinic for painful swollen lump on the left foot.  Notes it has been going on for a year.  Will be an aching pain.  Worse with pressure.  Denies specific injury.  Wondering what is causing this and what can be done for it.    Review of Systems:  Patient denies fever, chills, rash, wound, stiffness, limping, numbness, weakness, heart burn, blood in stool, chest pain with activity, calf pain when walking, shortness of breath with activity, chronic cough, easy bleeding/bruising, swelling of ankles, excessive thirst, fatigue, depression, anxiety.  .     PAST MEDICAL HISTORY:   Past Medical History:   Diagnosis Date     Cervical radiculopathy     finger tingling     Insulin pump status      Left knee injury     MCL, ACL injury     Type 1 diabetes (H)         PAST SURGICAL HISTORY:   Past Surgical History:   Procedure Laterality Date     IR LUMBAR PUNCTURE  6/16/2021        MEDICATIONS:   Current Outpatient Medications:      acetone urine (KETOSTIX) test strip, Use for urine testing if very high glucose level and/or severe nausea, as directed, Disp: 25 strip, Rfl: 3     atorvastatin (LIPITOR) 20 MG tablet, Take 1 tablet (20 mg) by mouth daily, Disp: 90 tablet, Rfl: 1     blood glucose (CONTOUR NEXT TEST) test strip, USE TO TEST FIVE TIMES DAILY, Disp: 500 strip, Rfl: 1     blood glucose (NO BRAND SPECIFIED) test strip, Use to test blood sugar 4 times daily or as directed, Accu-check Guide strips., Disp: 400 strip, Rfl: 3     Glucagon (BAQSIMI TWO PACK) 3 MG/DOSE POWD, Spray 1 each in nostril once as needed (In the event of low blood sugar),  "Disp: 1 each, Rfl: 1     insulin degludec (TRESIBA FLEXTOUCH) 100 UNIT/ML pen, Inject 27 units daily as directed, insulin pens as backup for his insulin pump use, Disp: 15 mL, Rfl: 0     insulin lispro (HUMALOG VIAL) 100 UNIT/ML vial, Use with insulin pump, total daily dose approx 95U, Disp: 30 mL, Rfl: 11     insulin pen needle (BD ARIADNA U/F) 32G X 4 MM, Use 1 daily as directed.  For emergency back up plan if pump fails., Disp: 30 each, Rfl: 1     INSULIN PUMP - OUTPATIENT, Date last updated:  9/17/18 Medtronic Minimed: Model 670 BASAL RATES and times: 12   AM: 1.25 units/hour   7 AM: 1.50 12 PM: 1.15 3 PM: 1.05 10:30 PM: 1.05 CARB RATIO and times: 12   AM: 7.2 11 AM: 7.2 4 PM: 7.0 Corection Factor and times: 12   AM (midnight): 44 mg/dL 6 AM: 42 9 PM: 44 BLOOD GLUCOSE TARGET and times: 12   AM (midnight): 100 - 130 6 AM  5 -130 Active Insulin Time: 3.0, Disp: , Rfl:      insulin syringe-needle U-100 (BD INSULIN SYRINGE ULTRAFINE) 30G X 1/2\" 0.5 ML, Use one syringe 3 daily or as directed. For back up if insulin pump fails., Disp: 100 each, Rfl: 1     lisinopril (ZESTRIL) 10 MG tablet, TAKE 1 TABLET(10 MG) BY MOUTH DAILY, Disp: 90 tablet, Rfl: 1     insulin aspart (NOVOLOG PEN) 100 UNIT/ML pen, Inject 3-12 units subcutaneous with meals and correction doses as directed, total daily dose approx 25 units. (Patient not taking: Reported on 5/23/2023), Disp: 15 mL, Rfl: 0     ALLERGIES:    Allergies   Allergen Reactions     Codeine      PN: LW Reaction: Nausea     Seasonal Allergies      Pollen        SOCIAL HISTORY:   Social History     Socioeconomic History     Marital status:      Spouse name: Not on file     Number of children: Not on file     Years of education: Not on file     Highest education level: Not on file   Occupational History     Not on file   Tobacco Use     Smoking status: Never     Smokeless tobacco: Never   Substance and Sexual Activity     Alcohol use: Yes     Drug use: No     Sexual " activity: Not on file   Other Topics Concern     Parent/sibling w/ CABG, MI or angioplasty before 65F 55M? Not Asked   Social History Narrative     Not on file     Social Drivers of Health     Financial Resource Strain: Not on file   Food Insecurity: Not on file   Transportation Needs: Not on file   Physical Activity: Not on file   Stress: Not on file   Social Connections: Not on file   Interpersonal Safety: Not on file   Housing Stability: Not on file        FAMILY HISTORY:   Family History   Problem Relation Age of Onset     Other Cancer Father      Heart Failure Maternal Grandfather         EXAM:Vitals: /78   Wt 92.5 kg (204 lb)   BMI 30.13 kg/m    BMI= Body mass index is 30.13 kg/m .    A1C: 7.4 (10/18/2024)    General appearance: Patient is alert and fully cooperative with history & exam.  No sign of distress is noted during the visit.     Psychiatric: Affect is pleasant & appropriate.  Patient appears motivated to improve health.     Respiratory: Breathing is regular & unlabored while sitting.     HEENT: Hearing is intact to spoken word.  Speech is clear.  No gross evidence of visual impairment that would impact ambulation.     Dermatologic: Skin is intact to both lower extremities without significant lesions, rash or abrasion.  No paronychia or evidence of soft tissue infection is noted.     Vascular: DP & PT pulses are intact & regular bilaterally.  No significant edema or varicosities noted.  CFT and skin temperature is normal to both lower extremities.     Neurologic: Lower extremity sensation is intact to light touch.  No evidence of weakness or contracture in the lower extremities.  No evidence of neuropathy.     Musculoskeletal: Patient is ambulatory without assistive device or brace.  Firm mobile nodule to the medial band of the left arch.  This is approximately 1 cm x 1 cm.  Pain on palpation.     ASSESSMENT:    Localized swelling of left foot  Plantar fascial fibromatosis  Uncomplicated type  1 diabetes mellitus (H)     Medical Decision Making/Plan:  Reviewed patient's chart in epic. We also talked about causes of plantar fibromas.  They are fibrous masses in the plantar fascia that are benign.  Discussed treatment such as padding, shoe gear, injections, physical therapy.  We also discussed that sometimes these require surgical removal.  Normally this is a last resort.  Risks of surgery including high recurrence rate, continued pain, painful scarring.     Is going to monitor it.  Recommend over-the-counter pain cream such as Voltaren gel once to twice a day.  He could cut out an insert to help keep pressure off of it.  If pain gets worse we could try cortisone injection or iontophoresis with physical therapy.    Questions were answered to patient satisfaction and he will call further questions or concerns.    Patient risk factor: Patient is at low risk for infection.        Farhana Magana DPM, Podiatry/Foot and Ankle Surgery      Again, thank you for allowing me to participate in the care of your patient.        Sincerely,        Farhana Magana DPM, Podiatry/Foot and Ankle Surgery

## 2024-12-05 ENCOUNTER — TELEPHONE (OUTPATIENT)
Dept: ENDOCRINOLOGY | Facility: CLINIC | Age: 44
End: 2024-12-05
Payer: COMMERCIAL

## 2024-12-05 DIAGNOSIS — Z96.41 INSULIN PUMP STATUS: ICD-10-CM

## 2024-12-05 DIAGNOSIS — E10.3299 TYPE 1 DIABETES MELLITUS WITH BACKGROUND RETINOPATHY (H): ICD-10-CM

## 2024-12-05 DIAGNOSIS — E10.9 TYPE 1 DIABETES MELLITUS WITHOUT COMPLICATION (H): ICD-10-CM

## 2024-12-05 NOTE — TELEPHONE ENCOUNTER
Rec'd mychart refill request:    Refills have been requested for the following medications:         blood glucose (CONTOUR NEXT TEST) test strip [Onur Schneider]     Preferred pharmacy: [id number] [place] [facility] [place] [address] [facility] [address]   ------------------------------------------------------------------------    Last Written Prescription Date:  8/25/22  Last Fill Quantity: 400,  # refills: 3   Last office visit: 10/21/2024 ; last virtual visit: 7/23/2024 with prescribing provider:  Dr. Schneider   Future Office Visit: 1/23/25    Requested Prescriptions   Pending Prescriptions Disp Refills    blood glucose (CONTOUR NEXT TEST) test strip 500 strip 1     Sig: USE TO TEST FIVE TIMES DAILY       There is no refill protocol information for this order        Refills sent  Aaliyah López, RN

## 2024-12-21 DIAGNOSIS — E10.3299 TYPE 1 DIABETES MELLITUS WITH BACKGROUND RETINOPATHY (H): ICD-10-CM

## 2024-12-23 RX ORDER — ATORVASTATIN CALCIUM 20 MG/1
20 TABLET, FILM COATED ORAL DAILY
Qty: 90 TABLET | Refills: 1 | Status: SHIPPED | OUTPATIENT
Start: 2024-12-23

## 2024-12-23 NOTE — TELEPHONE ENCOUNTER
Last Written Prescription Date:  7/23/24  Last Fill Quantity: 90,  # refills: 1   Last office visit: 10/21/2024 ; last virtual visit: 7/23/2024 with prescribing provider:  Dr. Schneider   Future Office Visit:  1/23/25    Requested Prescriptions   Pending Prescriptions Disp Refills    atorvastatin (LIPITOR) 20 MG tablet [Pharmacy Med Name: ATORVASTATIN 20MG TABLETS] 90 tablet 1     Sig: TAKE 1 TABLET(20 MG) BY MOUTH DAILY       Antihyperlipidemic agents Failed - 12/23/2024  9:50 AM        Failed - LDL on file in the past 12 months        Passed - Medication is active on med list        Passed - Recent (12 mo) or future (90 days) visit within the authorizing provider's specialty     The patient must have completed an in-person or virtual visit within the past 12 months or has a future visit scheduled within the next 90 days with the authorizing provider s specialty.  Urgent care and e-visits do not qualify as an office visit for this protocol.          Passed - Patient is age 18 years or older           Refills sent  Aaliyah López RN

## 2025-01-18 ENCOUNTER — MYC REFILL (OUTPATIENT)
Dept: EDUCATION SERVICES | Facility: CLINIC | Age: 45
End: 2025-01-18
Payer: COMMERCIAL

## 2025-01-18 DIAGNOSIS — E10.9 TYPE 1 DIABETES MELLITUS WITHOUT COMPLICATION (H): ICD-10-CM

## 2025-01-20 RX ORDER — GLUCAGON 3 MG/1
3 POWDER NASAL
Qty: 1 EACH | Refills: 3 | Status: SHIPPED | OUTPATIENT
Start: 2025-01-20

## 2025-01-20 NOTE — TELEPHONE ENCOUNTER
Requested Prescriptions   Pending Prescriptions Disp Refills    Glucagon (BAQSIMI TWO PACK) 3 MG/DOSE nasal powder 1 each 1     Sig: Spray 1 spray (3 mg) in nostril once as needed (In the event of low blood sugar).       Glucagon Protocol Passed - 1/20/2025  9:36 AM        Passed - Medication is active on med list        Passed - Patient is 18 years of age or older           Last Written Prescription Date:  3/18/22  Last Fill Quantity: 1 each,  # refills: 1   Last office visit: Visit date not found ; last virtual visit: Visit date not found with prescribing provider:  Dr Starks   Future Office Visit:  1/23/25    Patient has established with Dr Schneider with LOV being 10/21/24. Message routed to Dr Schneider to advise on refill.    Blas Wilkinson RN

## 2025-01-21 ENCOUNTER — LAB (OUTPATIENT)
Dept: LAB | Facility: CLINIC | Age: 45
End: 2025-01-21
Payer: COMMERCIAL

## 2025-01-21 DIAGNOSIS — Z96.41 INSULIN PUMP STATUS: ICD-10-CM

## 2025-01-21 DIAGNOSIS — E10.3299 TYPE 1 DIABETES MELLITUS WITH BACKGROUND RETINOPATHY (H): ICD-10-CM

## 2025-01-21 LAB
ANION GAP SERPL CALCULATED.3IONS-SCNC: 9 MMOL/L (ref 7–15)
BUN SERPL-MCNC: 16.2 MG/DL (ref 6–20)
CALCIUM SERPL-MCNC: 9.3 MG/DL (ref 8.8–10.4)
CHLORIDE SERPL-SCNC: 103 MMOL/L (ref 98–107)
CHOLEST SERPL-MCNC: 145 MG/DL
CREAT SERPL-MCNC: 1.11 MG/DL (ref 0.67–1.17)
EGFRCR SERPLBLD CKD-EPI 2021: 83 ML/MIN/1.73M2
EST. AVERAGE GLUCOSE BLD GHB EST-MCNC: 157 MG/DL
FASTING STATUS PATIENT QL REPORTED: YES
FASTING STATUS PATIENT QL REPORTED: YES
GLUCOSE SERPL-MCNC: 111 MG/DL (ref 70–99)
HBA1C MFR BLD: 7.1 % (ref 0–5.6)
HCO3 SERPL-SCNC: 27 MMOL/L (ref 22–29)
HDLC SERPL-MCNC: 52 MG/DL
LDLC SERPL CALC-MCNC: 77 MG/DL
NONHDLC SERPL-MCNC: 93 MG/DL
POTASSIUM SERPL-SCNC: 4.2 MMOL/L (ref 3.4–5.3)
SODIUM SERPL-SCNC: 139 MMOL/L (ref 135–145)
TRIGL SERPL-MCNC: 78 MG/DL
TSH SERPL DL<=0.005 MIU/L-ACNC: 3.55 UIU/ML (ref 0.3–4.2)

## 2025-01-21 PROCEDURE — 80048 BASIC METABOLIC PNL TOTAL CA: CPT

## 2025-01-21 PROCEDURE — 80061 LIPID PANEL: CPT

## 2025-01-21 PROCEDURE — 84443 ASSAY THYROID STIM HORMONE: CPT

## 2025-01-21 PROCEDURE — 36415 COLL VENOUS BLD VENIPUNCTURE: CPT

## 2025-01-21 PROCEDURE — 83036 HEMOGLOBIN GLYCOSYLATED A1C: CPT

## 2025-01-23 ENCOUNTER — VIRTUAL VISIT (OUTPATIENT)
Dept: ENDOCRINOLOGY | Facility: CLINIC | Age: 45
End: 2025-01-23
Payer: COMMERCIAL

## 2025-01-23 DIAGNOSIS — E10.3299 TYPE 1 DIABETES MELLITUS WITH BACKGROUND RETINOPATHY (H): Primary | ICD-10-CM

## 2025-01-23 DIAGNOSIS — Z96.41 INSULIN PUMP STATUS: ICD-10-CM

## 2025-01-23 NOTE — PROGRESS NOTES
Virtual Visit Details    Type of service:  Video Visit     Originating Location (pt. Location): Home  Distant Location (provider location):  Off-site  Platform used for Video Visit: Mina      Recent issues:  Diabetes follow-up evaluation  Using the Medtronic 780G pump with Guardian 4 sensor ~8/2/23  Feeling well overall. No recent PCP appointments.         1990. Diagnosis of diabetes, age 10, living in [place]  Began insulin treatment with insulin injections, recalls taking NPH and Regular insulins  Treatment with multiple daily injection (MDI) plan  Has seen me for diabetes evaluations at my former clinic ([facility])  2008. Switched to Medtronic insulin pump  Subsequent upgrades to other Medtronic pumps  Had used Enlite CGMS sensor  8/2017. Upgraded to Medtronic 670G pump with Guardian3 sensor    Spring 2021. Upgraded to Medtronic 770G pump  8/2023. Upgraded to Medtronic 780G pump/Guardian4 CGM              Novolog in pump     Uses [employer] Wireless Contour Link meter, tests 3-4x/day  Perceives good hypoglycemia awareness symptoms  Current Medtronic 780G pump settings:       Recent pump Carelink report:             Previous [facility] hgbA1c trends include:  Lab Results   Component Value Date    A1C 7.1 (H) 01/21/2025    A1C 7.4 (H) 10/18/2024    A1C 7.2 (H) 07/11/2024    A1C 7.6 (H) 12/21/2023    A1C 7.9 (H) 09/05/2023        Recent [facility] labs include:  Lab Results   Component Value Date    A1C 7.1 (H) 01/21/2025     01/21/2025    POTASSIUM 4.2 01/21/2025    CHLORIDE 103 01/21/2025    CO2 27 01/21/2025    ANIONGAP 9 01/21/2025     (H) 01/21/2025    BUN 16.2 01/21/2025    CR 1.11 01/21/2025    GFRESTIMATED 83 01/21/2025    GFRESTBLACK 84 03/18/2021    WILBERTO 9.3 01/21/2025    CHOL 145 01/21/2025    TRIG 78 01/21/2025    HDL 52 01/21/2025    LDL 77 01/21/2025    NHDL 93 01/21/2025    UCRR 102.0 07/11/2024    MICROL <12.0 07/11/2024    UMALCR  07/11/2024      Comment:      Unable to calculate, urine albumin and/or urine creatinine is  outside detectable limits.  Microalbuminuria is defined as an albumin:creatinine ratio of 17 to 299 for males and 25 to 299 for females. A ratio of albumin:creatinine of 300 or higher is indicative of overt proteinuria.  Due to biologic variability, positive results should be confirmed by a second, first-morning random or 24-hour timed urine specimen. If there is discrepancy, a third specimen is recommended. When 2 out of 3 results are in the microalbuminuria range, this is evidence for incipient nephropathy and warrants increased efforts at glucose control, blood pressure control, and institution of therapy with an angiotensin-converting-enzyme (ACE) inhibitor (if the patient can tolerate it).       Lab Results   Component Value Date    TSH 3.55 01/21/2025     Goes to [facility], Dr. Robles, last eye exam 12/9/24   DM Complications:               Retinopathy                          Mild NPDR        , 2 children, lives in [place].   Works as COB executive (future ) with [employer], 2nd home in [place], [place]  Sees Dr. Kevyn Mcintyre/[facility] [place]    PMH/PSH:  Past Medical History:   Diagnosis Date    Cervical radiculopathy     finger tingling    Insulin pump status     Left knee injury     MCL, ACL injury    Plantar fascial fibromatosis of left foot     Type 1 diabetes (H)      Past Surgical History:   Procedure Laterality Date    IR LUMBAR PUNCTURE  6/16/2021       Family Hx:  Family History   Problem Relation Age of Onset    Other Cancer Father     Heart Failure Maternal Grandfather          Social Hx:  Social History     Socioeconomic History    Marital status:      Spouse name: Not on file    Number of children: Not on file    Years of education: Not on file    Highest education level: Not on file   Occupational History    Not on file   Tobacco Use    Smoking status: Never    Smokeless tobacco: Never   Substance and Sexual Activity    Alcohol use: Yes    Drug use:  No    Sexual activity: Not on file   Other Topics Concern    Parent/sibling w/ CABG, MI or angioplasty before 65F 55M? Not Asked   Social History Narrative    Not on file     Social Drivers of Health     Financial Resource Strain: Not on file   Food Insecurity: Not on file   Transportation Needs: Not on file   Physical Activity: Not on file   Stress: Not on file   Social Connections: Not on file   Interpersonal Safety: Not on file   Housing Stability: Not on file          MEDICATIONS:  has a current medication list which includes the following prescription(s): atorvastatin, insulin lispro, acetone urine, contour next test, blood glucose, baqsimi two pack, insulin aspart, tresiba flextouch, insulin pen needle, insulin pump, insulin syringe-needle u-100, and lisinopril.       ROS: 10 point ROS neg other than the symptoms noted above in the HPI.     GENERAL: energy good, weight gain; denies fevers, chills, malaise, night sweats.   HEENT: no dysphagia, odonophagia, diplopia, neck pain or tenderness  THYROID:  no apparent hyper or hypothyroid symptoms  CV: no chest pain, pressure, palpitations, skipped beats  LUNGS: no SOB, VÁZQUEZ, cough, sputum production, wheezing   ABDOMEN: no diarrhea, constipation, abdominal pain  EXTREMITIES: palpable lump at bottom left foot; no apparent rashes, ulcers, edema  NEUROLOGY: no headaches, denies changes in vision, tingling, extremitiy numbness   MSK: some left knee pains; no other muscle aches or pains, weakness  SKIN: increased sweating?; no rashes or lesions  PSYCH:  stable mood, no significant anxiety or depression  ENDOCRINE: no heat or cold intolerance    Physical Exam (visual exam)  VS:  no vital signs taken for video visit  CONSTITUTIONAL: healthy, alert and NAD, well dressed, answering questions appropriately  ENT: no nose swelling or nasal discharge, mouth redness or gum changes.  EYES: eyes grossly normal to inspection, conjunctivae and sclerae normal, no exophthalmos or  proptosis  THYROID:  no apparent nodules or goiter  LUNGS: no audible wheeze, cough or visible cyanosis, no visible retractions or increased work of breathing  ABDOMEN: abdomen not evaluated  EXTREMITIES: no hand tremors, limited exam  NEUROLOGY: CN grossly intact, mentation intact and speech normal   SKIN:  no apparent skin lesions, rash, or edema with visualized skin appearance  PSYCH: mentation appears normal, affect normal/bright, judgement and insight intact,   normal speech and appearance well groomed      LABS:     All pertinent notes, labs, and images personally reviewed by me.        A/P:  Encounter Diagnoses   Name Primary?    Type 1 diabetes mellitus with background retinopathy (H) Yes    Insulin pump status        Comments:  Reviewed health history and diabetes issues  Overall glycemic control improved with Medtronic 780G pump and sensor use  Reviewed and interpreted tests that I previously ordered.   Ordered appropriate tests for the endocrinology disease management.    Management options discussed and implemented after shared medical decision making with the patient.  T1DM problem is chronic-stable    Plan:  Reviewed the overall T1DM management and insulin pump use.  Discussed optimal BG testing to assess glucose trends.  We reviewed insulin pump settings, basal rate and bolus dosing  Use of automated pump bolus dosing for meal/snack carb & correction dosing  Reviewed the recent Carelink pump and Guardian4 glucose sensor trend data, in detail     Recommend:  Continue current Medtronic 780G pump with Guardian4 CGM sensor, diabetes management plan  No pump setting changes at this time    Plan to do premeal meal and snack bolusing, avoid postmeal dosing  Continue use of the insulin pump auto-mode regularly  Use the Temp Target setting for aerobic exercise, including skiing trips or pickleball   Discussed idea of placing pump in shorts pocket or tabitha pack with sports  We have discussed vacation  travel  Plan to take extra pump supplies on trips, including rapid acting and longacting pens, pen needles   Take carb snacks to be available for aerobic skiing activities also   Also take old (770G) pump on trip   Sent him [employer] message with his current pump settings  Plan non- fasting lab testing in 5/2025   Testing at [facility]   Lab orders placed  Continue current atorvastatin daily dose  Continue lisinopril daily dose plan  Use shoe padding vs insert or see podiatrist vs PCP for the left foot plantar fascial fibromatosis  Arrange annual dilated eye exam, fasting lipid panel testing.  Needs follow-up endocrinology appointments every 3-4 months     Addressed patient's questions today.    The longitudinal plan of care for the endocrine problem(s) were addressed during this visit.  Due to added complexity of care,   we will continue to support the patient and the subsequent management of this condition with ongoing continuity of care.    There are no Patient Instructions on file for this visit.    Future labs ordered today:   Orders Placed This Encounter   Procedures    GLUCOSE MONITOR, 72 HOUR, PHYS INTERP    Hemoglobin A1c    Basic metabolic panel    Albumin Random Urine Quantitative with Creat Ratio     Radiology/Consults ordered today: None    Total time spent on day of encounter:  30 min    Follow-up:  5/6/25 at 11am, Return    ELIZABETH Schneider MD, MS  Endocrinology  [facility]

## 2025-03-24 ENCOUNTER — MYC MEDICAL ADVICE (OUTPATIENT)
Dept: ENDOCRINOLOGY | Facility: CLINIC | Age: 45
End: 2025-03-24
Payer: COMMERCIAL

## 2025-05-05 ENCOUNTER — LAB (OUTPATIENT)
Dept: LAB | Facility: CLINIC | Age: 45
End: 2025-05-05
Payer: COMMERCIAL

## 2025-05-05 DIAGNOSIS — Z96.41 INSULIN PUMP STATUS: ICD-10-CM

## 2025-05-05 DIAGNOSIS — E10.3299 TYPE 1 DIABETES MELLITUS WITH BACKGROUND RETINOPATHY (H): ICD-10-CM

## 2025-05-05 LAB
ANION GAP SERPL CALCULATED.3IONS-SCNC: 8 MMOL/L (ref 7–15)
BUN SERPL-MCNC: 19.4 MG/DL (ref 6–20)
CALCIUM SERPL-MCNC: 9.1 MG/DL (ref 8.8–10.4)
CHLORIDE SERPL-SCNC: 105 MMOL/L (ref 98–107)
CREAT SERPL-MCNC: 1.06 MG/DL (ref 0.67–1.17)
CREAT UR-MCNC: 371 MG/DL
EGFRCR SERPLBLD CKD-EPI 2021: 88 ML/MIN/1.73M2
EST. AVERAGE GLUCOSE BLD GHB EST-MCNC: 154 MG/DL
GLUCOSE SERPL-MCNC: 144 MG/DL (ref 70–99)
HBA1C MFR BLD: 7 % (ref 0–5.6)
HCO3 SERPL-SCNC: 26 MMOL/L (ref 22–29)
MICROALBUMIN UR-MCNC: 16.2 MG/L
MICROALBUMIN/CREAT UR: 4.37 MG/G CR (ref 0–17)
POTASSIUM SERPL-SCNC: 4.7 MMOL/L (ref 3.4–5.3)
SODIUM SERPL-SCNC: 139 MMOL/L (ref 135–145)

## 2025-05-05 PROCEDURE — 36415 COLL VENOUS BLD VENIPUNCTURE: CPT

## 2025-05-05 PROCEDURE — 82043 UR ALBUMIN QUANTITATIVE: CPT

## 2025-05-05 PROCEDURE — 83036 HEMOGLOBIN GLYCOSYLATED A1C: CPT

## 2025-05-05 PROCEDURE — 80048 BASIC METABOLIC PNL TOTAL CA: CPT

## 2025-05-05 PROCEDURE — 82570 ASSAY OF URINE CREATININE: CPT

## 2025-05-08 ENCOUNTER — RESULTS FOLLOW-UP (OUTPATIENT)
Dept: ENDOCRINOLOGY | Facility: CLINIC | Age: 45
End: 2025-05-08

## 2025-05-12 ENCOUNTER — OFFICE VISIT (OUTPATIENT)
Dept: ENDOCRINOLOGY | Facility: CLINIC | Age: 45
End: 2025-05-12
Payer: COMMERCIAL

## 2025-05-12 VITALS
HEART RATE: 69 BPM | BODY MASS INDEX: 27.02 KG/M2 | DIASTOLIC BLOOD PRESSURE: 83 MMHG | WEIGHT: 183 LBS | SYSTOLIC BLOOD PRESSURE: 131 MMHG

## 2025-05-12 DIAGNOSIS — Z96.41 INSULIN PUMP STATUS: ICD-10-CM

## 2025-05-12 DIAGNOSIS — E10.3299 TYPE 1 DIABETES MELLITUS WITH BACKGROUND RETINOPATHY (H): Primary | ICD-10-CM

## 2025-05-12 PROCEDURE — 95251 CONT GLUC MNTR ANALYSIS I&R: CPT | Performed by: INTERNAL MEDICINE

## 2025-05-12 PROCEDURE — 3075F SYST BP GE 130 - 139MM HG: CPT | Performed by: INTERNAL MEDICINE

## 2025-05-12 PROCEDURE — 3079F DIAST BP 80-89 MM HG: CPT | Performed by: INTERNAL MEDICINE

## 2025-05-12 PROCEDURE — G2211 COMPLEX E/M VISIT ADD ON: HCPCS | Performed by: INTERNAL MEDICINE

## 2025-05-12 PROCEDURE — 99214 OFFICE O/P EST MOD 30 MIN: CPT | Performed by: INTERNAL MEDICINE

## 2025-05-12 NOTE — PROGRESS NOTES
Recent issues:  Diabetes follow-up evaluation  Using the Medtronic 780G pump with Guardian 4 sensor, current pump has linear crack from battery compartment, and clip broken  Started (off label) terzepatide in 12/2025 with  Medical in May, now on a low dose 23U weekly, wt loss 19#/6 months  Feeling well overall.          1990. Diagnosis of diabetes, age 10, living in [place]  Began insulin treatment with insulin injections, recalls taking NPH and Regular insulins  Treatment with multiple daily injection (MDI) plan  Has seen me for diabetes evaluations at my former clinic ([facility])  2008. Switched to Medtronic insulin pump  Subsequent upgrades to other Medtronic pumps  Had used [employer] CGMS sensor  8/2017. Upgraded to Medtronic 670G pump with Guardian3 sensor    Spring 2021. Upgraded to Medtronic 770G pump  8/2023. Upgraded to Medtronic 780G pump/Guardian4 CGM              Humalog in pump     Uses [employer] Contour Link meter, tests 3-4x/day  Perceives good hypoglycemia awareness symptoms  Current Medtronic 780G pump settings:       Recent pump Carelink report:           Previous [facility] hgbA1c trends include:  Lab Results   Component Value Date    A1C 7.0 (H) 05/05/2025    A1C 7.1 (H) 01/21/2025    A1C 7.4 (H) 10/18/2024    A1C 7.2 (H) 07/11/2024    A1C 7.6 (H) 12/21/2023        Recent [facility] labs include:  Lab Results   Component Value Date    A1C 7.0 (H) 05/05/2025     05/05/2025    POTASSIUM 4.7 05/05/2025    CHLORIDE 105 05/05/2025    CO2 26 05/05/2025    ANIONGAP 8 05/05/2025     (H) 05/05/2025    BUN 19.4 05/05/2025    CR 1.06 05/05/2025    GFRESTIMATED 88 05/05/2025    GFRESTBLACK 84 03/18/2021    WILBERTO 9.1 05/05/2025    CHOL 145 01/21/2025    TRIG 78 01/21/2025    HDL 52 01/21/2025    LDL 77 01/21/2025    NHDL 93 01/21/2025    UCRR 371.0 05/05/2025    MICROL 16.2 05/05/2025    UMALCR 4.37 05/05/2025     Lab Results   Component Value Date    TSH 3.55 01/21/2025     Goes to [facility], Dr. Robles, last  eye exam 12/9/24   DM Complications:               Retinopathy                          Mild NPDR        , 2 children, lives in [place].   Works as  of [employer], 2nd home in [place], [place]  Sees Dr. Kevyn Mcintyre/[facility] [place]    PMH/PSH:  Past Medical History:   Diagnosis Date    Cervical radiculopathy     finger tingling    Insulin pump status     Left knee injury     MCL, ACL injury    Plantar fascial fibromatosis of left foot     Type 1 diabetes (H)      Past Surgical History:   Procedure Laterality Date    IR LUMBAR PUNCTURE  6/16/2021       Family Hx:  Family History   Problem Relation Age of Onset    Other Cancer Father     Heart Failure Maternal Grandfather          Social Hx:  Social History     Socioeconomic History    Marital status:      Spouse name: Not on file    Number of children: Not on file    Years of education: Not on file    Highest education level: Not on file   Occupational History    Not on file   Tobacco Use    Smoking status: Never    Smokeless tobacco: Never   Substance and Sexual Activity    Alcohol use: Yes    Drug use: No    Sexual activity: Not on file   Other Topics Concern    Parent/sibling w/ CABG, MI or angioplasty before 65F 55M? Not Asked   Social History Narrative    Not on file     Social Drivers of Health     Financial Resource Strain: Not on file   Food Insecurity: Not on file   Transportation Needs: Not on file   Physical Activity: Not on file   Stress: Not on file   Social Connections: Not on file   Interpersonal Safety: Not on file   Housing Stability: Not on file          MEDICATIONS:  has a current medication list which includes the following prescription(s): atorvastatin, insulin lispro, lisinopril, tirzepatide, acetone urine, contour next test, blood glucose, baqsimi two pack, insulin aspart, tresiba flextouch, insulin pen needle, insulin pump, and insulin syringe-needle u-100.       ROS: 10 point ROS neg other than the symptoms  noted above in the HPI.     GENERAL: energy good, weight loss as noted; denies fevers, chills, malaise, night sweats.   HEENT: no dysphagia, odonophagia, diplopia, neck pain or tenderness  THYROID:  no apparent hyper or hypothyroid symptoms  CV: no chest pain, pressure, palpitations, skipped beats  LUNGS: no SOB, VÁZQUEZ, cough, sputum production, wheezing   ABDOMEN: occasional constipation/diarrhea; no bloating, abdominal pain  EXTREMITIES: palpable lump at bottom left foot; no apparent rashes, ulcers, edema  NEUROLOGY: no headaches, denies changes in vision, tingling, extremitiy numbness   MSK: some left knee pains; no other muscle aches or pains, weakness  SKIN: increased sweating?; no rashes or lesions  PSYCH:  stable mood, no significant anxiety or depression  ENDOCRINE: no heat or cold intolerance      Physical Exam   VS: /83   Pulse 69   Wt 83 kg (183 lb)   BMI 27.02 kg/m    GENERAL: AXOX3, NAD, well dressed, answering questions appropriately, appears stated age.  ENT: no nose swelling or nasal discharge, mouth redness or gum changes.  EYES: eyes grossly normal to inspection, conjunctivae and sclerae normal, no exophthalmos or proptosis  THYROID:  no apparent nodules or goiter  LUNGS: no audible wheeze, cough or visible cyanosis, or increased work of breathing  ABDOMEN: abdomen normal size  EXTREMITIES: normal appearance of feet, pulses R/L DP 3/3, no pedal edema noted  NEUROLOGY: CN grossly intact, no tremors  MSK: grossly intact  SKIN:  no apparent skin lesions, rash, or edema with visualized skin appearance  PSYCH: mentation appears normal, affect normal/bright, judgement and insight intact,   normal speech and appearance well groomed      LABS:     All pertinent notes, labs, and images personally reviewed by me.        A/P:  Encounter Diagnoses   Name Primary?    Type 1 diabetes mellitus with background retinopathy (H) Yes    Insulin pump status        Comments:  Reviewed health history and diabetes  issues  Overall glycemic control improved with Medtronic 780G pump and sensor use  Weight loss on compounded tirzepatide  Reviewed and interpreted tests that I previously ordered.   Ordered appropriate tests for the endocrinology disease management.    Management options discussed and implemented after shared medical decision making with the patient.  T1DM problem is chronic-stable    Plan:  Reviewed the overall T1DM management and insulin pump use.  Discussed optimal BG testing to assess glucose trends.  We reviewed insulin pump settings, basal rate and bolus dosing  Use of automated pump bolus dosing for meal/snack carb & correction dosing  Reviewed the recent Carelink pump and Guardian4 glucose sensor trend data, in detail     Recommend:  Continue current Medtronic 780G pump with Guardian4 CGM sensor, diabetes management plan  No pump setting changes at this time    Plan to do premeal meal and snack bolusing, avoid postmeal dosing  Continue use of the insulin pump auto-mode regularly  Encouraged patient to contact Medtronic [facility] to request replacement pump due to the crack, broken battery clip defects  Use the Temp Target setting for aerobic exercise, including skiing trips or pickleball   Discussed idea of placing pump in shorts pocket or tabitha pack with sports  He is being treated by [facility] with compounded tirzepatide, despite FDA mandate against distribution of this medication by non-[employer] companies  Plan non- fasting lab testing in 9/2025   Testing at [facility] clinic   Lab orders placed  Continue current atorvastatin daily dose  Continue lisinopril daily dose plan  Use shoe padding vs insert or see podiatrist vs PCP for the left foot plantar fascial fibromatosis  Arrange annual dilated eye exam, fasting lipid panel testing.  Needs follow-up endocrinology appointments every 3-4 months     Addressed patient's questions today.    The longitudinal plan of care for the endocrine problem(s) were  addressed during this visit.  Due to added complexity of care,   we will continue to support the patient and the subsequent management of this condition with ongoing continuity of care.    There are no Patient Instructions on file for this visit.    Future labs ordered today:   Orders Placed This Encounter   Procedures    GLUCOSE MONITOR, 72 HOUR, PHYS INTERP    Hemoglobin A1c    Basic metabolic panel     Radiology/Consults ordered today: None    Total time spent on day of encounter:  31 min    Follow-up:  9/2025 VVAm,    12/2025 Return    ELIZABETH Schneider MD, MS  Endocrinology  [facility]

## 2025-06-15 ENCOUNTER — HEALTH MAINTENANCE LETTER (OUTPATIENT)
Age: 45
End: 2025-06-15